# Patient Record
Sex: MALE | Race: WHITE | Employment: OTHER | URBAN - METROPOLITAN AREA
[De-identification: names, ages, dates, MRNs, and addresses within clinical notes are randomized per-mention and may not be internally consistent; named-entity substitution may affect disease eponyms.]

---

## 2017-01-04 PROBLEM — Z09 HOSPITAL DISCHARGE FOLLOW-UP: Status: ACTIVE | Noted: 2017-01-04

## 2017-01-27 PROBLEM — Z92.21 STATUS POST CHEMOTHERAPY: Status: ACTIVE | Noted: 2017-01-27

## 2017-03-16 PROBLEM — C91.10 CLL (CHRONIC LYMPHOCYTIC LEUKEMIA) (HCC): Status: ACTIVE | Noted: 2017-03-16

## 2017-07-14 ENCOUNTER — HOSPITAL ENCOUNTER (OUTPATIENT)
Dept: CT IMAGING | Age: 72
Discharge: HOME | End: 2017-07-14
Attending: INTERNAL MEDICINE | Admitting: INTERNAL MEDICINE

## 2017-07-14 DIAGNOSIS — C91.10 CHRONIC LYMPHOCYTIC LEUKEMIA (HCC): ICD-10-CM

## 2017-07-14 DIAGNOSIS — Z79.899 ENCOUNTER FOR LONG-TERM (CURRENT) USE OF HIGH-RISK MEDICATION: ICD-10-CM

## 2017-07-14 DIAGNOSIS — Z79.899 OTHER LONG TERM (CURRENT) DRUG THERAPY: ICD-10-CM

## 2018-01-05 ENCOUNTER — HOSPITAL ENCOUNTER (OUTPATIENT)
Dept: CT IMAGING | Age: 73
Discharge: HOME | End: 2018-01-05
Attending: INTERNAL MEDICINE | Admitting: INTERNAL MEDICINE

## 2018-01-05 DIAGNOSIS — C91.12 CHRONIC LYMPHOCYTIC LEUKEMIA OF B-CELL TYPE IN RELAPSE (HCC): ICD-10-CM

## 2018-03-28 ENCOUNTER — HOSPITAL ENCOUNTER (OUTPATIENT)
Dept: CT IMAGING | Age: 73
Discharge: HOME | End: 2018-03-28
Attending: INTERNAL MEDICINE | Admitting: INTERNAL MEDICINE

## 2018-03-28 DIAGNOSIS — C91.12 CHRONIC LYMPHOCYTIC LEUKEMIA OF B-CELL TYPE IN RELAPSE (HCC): ICD-10-CM

## 2018-03-28 DIAGNOSIS — C91.10 CHRONIC LYMPHOCYTIC LEUKEMIA OF B-CELL TYPE NOT HAVING ACHIEVED REMISSION (HCC): ICD-10-CM

## 2018-03-28 LAB
GFR AFRICAN AMERICAN: >60
GFR NON-AFRICAN AMERICAN: 54
PERFORMED ON: ABNORMAL
POC CREATININE: 1.3 MG/DL (ref 0.8–1.3)
POC SAMPLE TYPE: ABNORMAL

## 2018-09-27 ENCOUNTER — HOSPITAL ENCOUNTER (OUTPATIENT)
Dept: CT IMAGING | Age: 73
Discharge: HOME | End: 2018-09-27
Payer: COMMERCIAL

## 2018-09-27 DIAGNOSIS — C91.10 CLL (CHRONIC LYMPHOCYTIC LEUKEMIA) (HCC): ICD-10-CM

## 2018-09-27 PROBLEM — Z09 HOSPITAL DISCHARGE FOLLOW-UP: Status: RESOLVED | Noted: 2017-01-04 | Resolved: 2018-09-27

## 2018-09-27 PROCEDURE — 74177 CT ABD & PELVIS W/CONTRAST: CPT

## 2018-09-27 PROCEDURE — 6360000004 HC RX CONTRAST MEDICATION: Performed by: FAMILY MEDICINE

## 2018-09-27 PROCEDURE — 71260 CT THORAX DX C+: CPT

## 2018-09-27 RX ADMIN — IOHEXOL 50 ML: 240 INJECTION, SOLUTION INTRATHECAL; INTRAVASCULAR; INTRAVENOUS; ORAL at 09:13

## 2018-09-27 RX ADMIN — IOPAMIDOL 75 ML: 755 INJECTION, SOLUTION INTRAVENOUS at 09:14

## 2018-12-24 ENCOUNTER — APPOINTMENT (OUTPATIENT)
Dept: CT IMAGING | Age: 73
End: 2018-12-24
Payer: COMMERCIAL

## 2018-12-24 ENCOUNTER — HOSPITAL ENCOUNTER (EMERGENCY)
Age: 73
Discharge: HOME | End: 2018-12-24
Attending: EMERGENCY MEDICINE
Payer: COMMERCIAL

## 2018-12-24 VITALS
SYSTOLIC BLOOD PRESSURE: 147 MMHG | WEIGHT: 193.78 LBS | HEART RATE: 87 BPM | RESPIRATION RATE: 14 BRPM | HEIGHT: 64 IN | OXYGEN SATURATION: 97 % | BODY MASS INDEX: 33.08 KG/M2 | DIASTOLIC BLOOD PRESSURE: 70 MMHG | TEMPERATURE: 97.7 F

## 2018-12-24 DIAGNOSIS — S01.01XA LACERATION OF SCALP, INITIAL ENCOUNTER: ICD-10-CM
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
## 2018-12-24 DIAGNOSIS — S09.90XA INJURY OF HEAD, INITIAL ENCOUNTER: ICD-10-CM

## 2018-12-24 PROCEDURE — 6360000002 HC RX W HCPCS: Performed by: EMERGENCY MEDICINE

## 2018-12-24 PROCEDURE — 72125 CT NECK SPINE W/O DYE: CPT

## 2018-12-24 PROCEDURE — 99284 EMERGENCY DEPT VISIT MOD MDM: CPT

## 2018-12-24 PROCEDURE — 70450 CT HEAD/BRAIN W/O DYE: CPT

## 2018-12-24 PROCEDURE — 90471 IMMUNIZATION ADMIN: CPT | Performed by: EMERGENCY MEDICINE

## 2018-12-24 PROCEDURE — 90715 TDAP VACCINE 7 YRS/> IM: CPT | Performed by: EMERGENCY MEDICINE

## 2018-12-24 PROCEDURE — 4500000024 HC ED LEVEL 4 PROCEDURE

## 2018-12-24 RX ORDER — GLIMEPIRIDE 1 MG/1
1 TABLET ORAL
COMMUNITY

## 2018-12-24 RX ORDER — VENLAFAXINE 37.5 MG/1
37.5 TABLET ORAL DAILY
COMMUNITY

## 2018-12-24 RX ADMIN — TETANUS TOXOID, REDUCED DIPHTHERIA TOXOID AND ACELLULAR PERTUSSIS VACCINE, ADSORBED 0.5 ML: 5; 2.5; 8; 8; 2.5 SUSPENSION INTRAMUSCULAR at 20:27

## 2018-12-24 ASSESSMENT — ENCOUNTER SYMPTOMS
DIARRHEA: 0
BACK PAIN: 0
VOMITING: 0
COUGH: 0
EYE PAIN: 0
SHORTNESS OF BREATH: 0
ABDOMINAL PAIN: 0
NAUSEA: 0

## 2018-12-25 NOTE — ED NOTES
[facility]  Expected date:   Expected time:   Means of arrival:   Comments:  [address], RN  12/24/18 1958

## 2018-12-26 ENCOUNTER — HOSPITAL ENCOUNTER (OUTPATIENT)
Dept: CT IMAGING | Age: 73
Discharge: HOME | End: 2018-12-26
Payer: COMMERCIAL

## 2018-12-26 DIAGNOSIS — C91.12 CHRONIC LYMPHOCYTIC LEUKEMIA OF B-CELL TYPE IN RELAPSE (HCC): ICD-10-CM

## 2018-12-26 LAB
GFR AFRICAN AMERICAN: >60
GFR NON-AFRICAN AMERICAN: 59
PERFORMED ON: ABNORMAL
POC CREATININE: 1.2 MG/DL (ref 0.8–1.3)
POC SAMPLE TYPE: ABNORMAL

## 2018-12-26 PROCEDURE — 82565 ASSAY OF CREATININE: CPT

## 2018-12-26 PROCEDURE — 74177 CT ABD & PELVIS W/CONTRAST: CPT

## 2018-12-26 PROCEDURE — 6360000004 HC RX CONTRAST MEDICATION: Performed by: INTERNAL MEDICINE

## 2018-12-26 RX ADMIN — IOVERSOL 75 ML: 678 INJECTION INTRA-ARTERIAL; INTRAVENOUS at 08:28

## 2018-12-26 RX ADMIN — IOHEXOL 50 ML: 240 INJECTION, SOLUTION INTRATHECAL; INTRAVASCULAR; INTRAVENOUS; ORAL at 08:28

## 2019-05-03 ENCOUNTER — HOSPITAL ENCOUNTER (OUTPATIENT)
Dept: CT IMAGING | Age: 74
Discharge: HOME | End: 2019-05-03
Payer: COMMERCIAL

## 2019-05-03 DIAGNOSIS — C91.12 CHRONIC LYMPHOCYTIC LEUKEMIA OF B-CELL TYPE IN RELAPSE (HCC): ICD-10-CM

## 2019-05-03 LAB
GFR AFRICAN AMERICAN: >60
GFR NON-AFRICAN AMERICAN: >60
PERFORMED ON: NORMAL
POC CREATININE: 1 MG/DL (ref 0.8–1.3)
POC SAMPLE TYPE: NORMAL

## 2019-05-03 PROCEDURE — 82565 ASSAY OF CREATININE: CPT

## 2019-05-03 PROCEDURE — 6360000004 HC RX CONTRAST MEDICATION: Performed by: INTERNAL MEDICINE

## 2019-05-03 PROCEDURE — 74177 CT ABD & PELVIS W/CONTRAST: CPT

## 2019-05-03 RX ADMIN — IOPAMIDOL 75 ML: 755 INJECTION, SOLUTION INTRAVENOUS at 08:00

## 2019-05-03 RX ADMIN — IOHEXOL 50 ML: 240 INJECTION, SOLUTION INTRATHECAL; INTRAVASCULAR; INTRAVENOUS; ORAL at 08:00

## 2019-05-27 ENCOUNTER — APPOINTMENT (OUTPATIENT)
Dept: GENERAL RADIOLOGY | Age: 74
End: 2019-05-27
Payer: COMMERCIAL

## 2019-05-27 ENCOUNTER — HOSPITAL ENCOUNTER (EMERGENCY)
Age: 74
Discharge: HOME | End: 2019-05-27
Payer: COMMERCIAL

## 2019-05-27 VITALS
WEIGHT: 183.7 LBS | HEIGHT: 65 IN | SYSTOLIC BLOOD PRESSURE: 136 MMHG | TEMPERATURE: 98 F | BODY MASS INDEX: 30.6 KG/M2 | DIASTOLIC BLOOD PRESSURE: 82 MMHG | OXYGEN SATURATION: 99 % | RESPIRATION RATE: 18 BRPM | HEART RATE: 80 BPM

## 2019-05-27 DIAGNOSIS — R05.9 COUGH: ICD-10-CM

## 2019-05-27 DIAGNOSIS — R50.9 FEVER, UNSPECIFIED FEVER CAUSE: Primary | ICD-10-CM

## 2019-05-27 LAB
ANION GAP SERPL CALCULATED.3IONS-SCNC: 13 MMOL/L (ref 3–16)
BASOPHILS ABSOLUTE: 0 K/UL (ref 0–0.2)
BASOPHILS RELATIVE PERCENT: 0.5 %
BILIRUBIN URINE: NEGATIVE
BLOOD, URINE: NEGATIVE
BUN BLDV-MCNC: 15 MG/DL (ref 7–20)
CALCIUM SERPL-MCNC: 9.7 MG/DL (ref 8.3–10.6)
CHLORIDE BLD-SCNC: 94 MMOL/L (ref 99–110)
CHP ED QC CHECK: YES
CLARITY: CLEAR
CO2: 26 MMOL/L (ref 21–32)
COLOR: YELLOW
CREAT SERPL-MCNC: 1.2 MG/DL (ref 0.8–1.3)
EOSINOPHILS ABSOLUTE: 0 K/UL (ref 0–0.6)
EOSINOPHILS RELATIVE PERCENT: 0.1 %
GFR AFRICAN AMERICAN: >60
GFR NON-AFRICAN AMERICAN: 59
GLUCOSE BLD-MCNC: 282 MG/DL (ref 70–99)
GLUCOSE BLD-MCNC: 286 MG/DL
GLUCOSE BLD-MCNC: 286 MG/DL (ref 70–99)
GLUCOSE URINE: >=1000 MG/DL
HCT VFR BLD CALC: 40.6 % (ref 40.5–52.5)
HEMOGLOBIN: 14.2 G/DL (ref 13.5–17.5)
KETONES, URINE: NEGATIVE MG/DL
LACTIC ACID: 1.6 MMOL/L (ref 0.4–2)
LEUKOCYTE ESTERASE, URINE: NEGATIVE
LYMPHOCYTES ABSOLUTE: 1.4 K/UL (ref 1–5.1)
LYMPHOCYTES RELATIVE PERCENT: 19 %
MCH RBC QN AUTO: 30.6 PG (ref 26–34)
MCHC RBC AUTO-ENTMCNC: 34.9 G/DL (ref 31–36)
MCV RBC AUTO: 87.8 FL (ref 80–100)
MICROSCOPIC EXAMINATION: ABNORMAL
MONOCYTES ABSOLUTE: 0.5 K/UL (ref 0–1.3)
MONOCYTES RELATIVE PERCENT: 7.4 %
NEUTROPHILS ABSOLUTE: 5.2 K/UL (ref 1.7–7.7)
NEUTROPHILS RELATIVE PERCENT: 73 %
NITRITE, URINE: NEGATIVE
PDW BLD-RTO: 14.1 % (ref 12.4–15.4)
PERFORMED ON: ABNORMAL
PH UA: 7 (ref 5–8)
PLATELET # BLD: 121 K/UL (ref 135–450)
PMV BLD AUTO: 7.1 FL (ref 5–10.5)
POTASSIUM REFLEX MAGNESIUM: 4.4 MMOL/L (ref 3.5–5.1)
PROTEIN UA: NEGATIVE MG/DL
RAPID INFLUENZA  B AGN: NEGATIVE
RAPID INFLUENZA A AGN: NEGATIVE
RBC # BLD: 4.63 M/UL (ref 4.2–5.9)
SODIUM BLD-SCNC: 133 MMOL/L (ref 136–145)
SPECIFIC GRAVITY UA: 1.02 (ref 1–1.03)
URINE REFLEX TO CULTURE: ABNORMAL
URINE TYPE: ABNORMAL
UROBILINOGEN, URINE: 0.2 E.U./DL
WBC # BLD: 7.1 K/UL (ref 4–11)

## 2019-05-27 PROCEDURE — 87040 BLOOD CULTURE FOR BACTERIA: CPT

## 2019-05-27 PROCEDURE — 85025 COMPLETE CBC W/AUTO DIFF WBC: CPT

## 2019-05-27 PROCEDURE — 6370000000 HC RX 637 (ALT 250 FOR IP): Performed by: PHYSICIAN ASSISTANT

## 2019-05-27 PROCEDURE — 81003 URINALYSIS AUTO W/O SCOPE: CPT

## 2019-05-27 PROCEDURE — 99284 EMERGENCY DEPT VISIT MOD MDM: CPT

## 2019-05-27 PROCEDURE — 71046 X-RAY EXAM CHEST 2 VIEWS: CPT

## 2019-05-27 PROCEDURE — 2580000003 HC RX 258: Performed by: PHYSICIAN ASSISTANT

## 2019-05-27 PROCEDURE — 96360 HYDRATION IV INFUSION INIT: CPT

## 2019-05-27 PROCEDURE — 83605 ASSAY OF LACTIC ACID: CPT

## 2019-05-27 PROCEDURE — 87804 INFLUENZA ASSAY W/OPTIC: CPT

## 2019-05-27 PROCEDURE — 80048 BASIC METABOLIC PNL TOTAL CA: CPT

## 2019-05-27 RX ORDER — ACETAMINOPHEN 500 MG
1000 TABLET ORAL ONCE
Status: COMPLETED | OUTPATIENT
Start: 2019-05-27 | End: 2019-05-27

## 2019-05-27 RX ORDER — 0.9 % SODIUM CHLORIDE 0.9 %
1000 INTRAVENOUS SOLUTION INTRAVENOUS ONCE
Status: COMPLETED | OUTPATIENT
Start: 2019-05-27 | End: 2019-05-27

## 2019-05-27 RX ORDER — ACETAMINOPHEN 500 MG
500 TABLET ORAL EVERY 6 HOURS PRN
Qty: 30 TABLET | Refills: 0 | Status: SHIPPED | OUTPATIENT
Start: 2019-05-27

## 2019-05-27 RX ORDER — AMOXICILLIN AND CLAVULANATE POTASSIUM 875; 125 MG/1; MG/1
1 TABLET, FILM COATED ORAL 2 TIMES DAILY
Qty: 20 TABLET | Refills: 0 | Status: SHIPPED | OUTPATIENT
Start: 2019-05-27 | End: 2019-06-06

## 2019-05-27 RX ADMIN — ACETAMINOPHEN 1000 MG: 500 TABLET ORAL at 17:30

## 2019-05-27 RX ADMIN — SODIUM CHLORIDE 1000 ML: 9 INJECTION, SOLUTION INTRAVENOUS at 17:31

## 2019-05-27 ASSESSMENT — ENCOUNTER SYMPTOMS
COUGH: 1
SHORTNESS OF BREATH: 0
ABDOMINAL PAIN: 0
BACK PAIN: 0
NAUSEA: 0
VOMITING: 0
DIARRHEA: 0
EYE PAIN: 0

## 2019-05-27 ASSESSMENT — PAIN SCALES - GENERAL
PAINLEVEL_OUTOF10: 0
PAINLEVEL_OUTOF10: 0

## 2019-05-27 NOTE — ED NOTES
D/C: Order noted for d/c. Pt confirmed d/c paperwork  have correct name. Discharge and education instructions reviewed with patient. Teach-back successful. Pt verbalized understanding and signed d/c papers. Pt denied questions at this time. No acute distress noted. Patient instructed to follow-up as noted - return to emergency department if symptoms worsen. Patient verbalized understanding. Discharged per EDMD with discharge instructions. Pt discharged per ambulation  to private vehicle. Patient stable upon departure. Thanked patient for choosing [facility] for care.           Betty Madera RN  05/27/19 [id number]

## 2019-05-27 NOTE — ED TRIAGE NOTES
Pt to room 6 via squad pt wife states pt has been more confused today pt temp on arrival 102.2  Pt alert to person place and date. Pt wife denies pt having a cough pt denies having any urinary symptoms.

## 2019-05-27 NOTE — ED PROVIDER NOTES
(VENCLEXTA PO)    Take by mouth    VENLAFAXINE (EFFEXOR) 37.5 MG TABLET    Take 37.5 mg by mouth 3 times daily         ALLERGIES     Patient has no known allergies. FAMILYHISTORY       Family History   Problem Relation Age of Onset    Breast Cancer Sister     Coronary Art Dis Mother     Diabetes Mother     Elevated Lipids Mother     Hypertension Mother     Obesity Mother     Obesity Brother           SOCIAL HISTORY       Social History     Socioeconomic History    Marital status:      Spouse name: None    Number of children: None    Years of education: None    Highest education level: None   Occupational History    None   Social Needs    Financial resource strain: None    Food insecurity:     Worry: None     Inability: None    Transportation needs:     Medical: None     Non-medical: None   Tobacco Use    Smoking status: Never Smoker    Smokeless tobacco: Never Used   Substance and Sexual Activity    Alcohol use: Yes     Comment: rare use    Drug use: No    Sexual activity: None   Lifestyle    Physical activity:     Days per week: None     Minutes per session: None    Stress: None   Relationships    Social connections:     Talks on phone: None     Gets together: None     Attends [employer] service: None     Active member of club or organization: None     Attends meetings of clubs or organizations: None     Relationship status: None    Intimate partner violence:     Fear of current or ex partner: None     Emotionally abused: None     Physically abused: None     Forced sexual activity: None   Other Topics Concern    None   Social History Narrative    None       SCREENINGS             PHYSICAL EXAM    (up to 7 for level 4, 8 or more for level 5)     ED Triage Vitals [05/27/19 1557]   BP Temp Temp Source Pulse Resp SpO2 Height Weight   (!) 142/63 102.2 °F (39 °C) Oral 80 16 99 % 5' 5\" (1.651 m) 183 lb 11.2 oz (83.3 kg)       Physical Exam   Constitutional: He appears well-developed.  [facility]  [address] [facility] [address]   Phone [phone]   POCT GLUCOSE - Normal   RAPID INFLUENZA A/B ANTIGENS    Narrative:     Performed at:  [facility]  [address] [facility] [address]   Phone [phone]   CULTURE BLOOD #1   CULTURE BLOOD #2   LACTIC ACID, PLASMA    Narrative:     Performed at:  [facility]  [address] [facility] [address]   Phone [phone]   LACTIC ACID, PLASMA       All other labs were within normal range or not returned as of this dictation. EKG: All EKG's are interpreted by the Emergency Department Physician who either signs orCo-signs this chart in the absence of a cardiologist.  Please see their note for interpretation of EKG. RADIOLOGY:   Non-plain film images such as CT, Ultrasound and MRI are read by the radiologist. Plain radiographic images are visualized andpreliminarily interpreted by the  ED Provider with the below findings:        Interpretation perthe Radiologist below, if available at the time of this note:    XR CHEST STANDARD (2 VW)   Final Result   No acute abnormality detected. No results found.        PROCEDURES   Unless otherwise noted below, none     Procedures    CRITICAL CARE TIME   N/A    CONSULTS:  IP CONSULT TO HEM/ONC      EMERGENCY DEPARTMENT COURSE and DIFFERENTIALDIAGNOSIS/MDM:   Vitals:    Vitals:    05/27/19 1557 05/27/19 1716 05/27/19 1731 05/27/19 1802   BP: (!) 142/63 (!) 128/49 (!) 114/52    Pulse: 80 79 79    Resp: 16 17 15    Temp: 102.2 °F (39 °C)   99.1 °F (37.3 °C)   TempSrc: Oral      SpO2: 99%      Weight: 183 lb 11.2 oz (83.3 kg)      Height: 5' 5\" (1.651 m)          Patient was given thefollowing medications:  Medications   0.9 % sodium chloride bolus (1,000 mLs Intravenous New Bag 5/27/19 1731)   acetaminophen (TYLENOL) tablet 1,000 mg (1,000 mg Oral Given 5/27/19 1730)       ED COURSE & MEDICAL DISPOSITION/PLAN   DISPOSITION Discharge - Pending Orders Complete 05/27/2019 05:59:28 PM      PATIENT REFERREDTO:  Magalis Gay MD  [address]   [address]  [address] [facility]  [phone]      ED follow up    [facility]  [address] [facility]  [phone]    If symptoms worsen      DISCHARGE MEDICATIONS:  New Prescriptions    ACETAMINOPHEN (APAP EXTRA STRENGTH) 500 MG TABLET    Take 1 tablet by mouth every 6 hours as needed for Pain or Fever    AMOXICILLIN-CLAVULANATE (AUGMENTIN) 875-125 MG PER TABLET    Take 1 tablet by mouth 2 times daily for 10 days       DISCONTINUED MEDICATIONS:  Discontinued Medications    No medications on file              (Please note that portions ofthis note were completed with a voice recognition program.  Efforts were made to edit the dictations but occasionally words are mis-transcribed.)    BEREKET Morales (electronically signed)            BEREKET Morales  05/27/19 8257

## 2019-06-01 LAB
BLOOD CULTURE, ROUTINE: NORMAL
CULTURE, BLOOD 2: NORMAL

## 2019-08-18 ENCOUNTER — HOSPITAL ENCOUNTER (EMERGENCY)
Age: 74
Discharge: HOME | End: 2019-08-18
Payer: COMMERCIAL

## 2019-08-18 VITALS
TEMPERATURE: 98.1 F | DIASTOLIC BLOOD PRESSURE: 69 MMHG | HEART RATE: 99 BPM | OXYGEN SATURATION: 95 % | RESPIRATION RATE: 16 BRPM | SYSTOLIC BLOOD PRESSURE: 103 MMHG

## 2019-08-18 DIAGNOSIS — L03.113 CELLULITIS OF RIGHT UPPER EXTREMITY: ICD-10-CM

## 2019-08-18 DIAGNOSIS — T81.49XA POSTOPERATIVE WOUND INFECTION: Primary | ICD-10-CM

## 2019-08-18 PROCEDURE — 99283 EMERGENCY DEPT VISIT LOW MDM: CPT

## 2019-08-18 RX ORDER — CEPHALEXIN 500 MG/1
500 CAPSULE ORAL 4 TIMES DAILY
Qty: 28 CAPSULE | Refills: 0 | Status: SHIPPED | OUTPATIENT
Start: 2019-08-18 | End: 2019-08-25

## 2019-08-18 ASSESSMENT — ENCOUNTER SYMPTOMS: RESPIRATORY NEGATIVE: 1

## 2019-08-18 NOTE — ED TRIAGE NOTES
pt presents to ED for basal spot removal on R FA and scalp on L side. bandage on R FA. pt is a patient of Dr Ondina Calloway. Dariana Mims NP in to see pt. Pt is AAOx4, VSS.

## 2019-08-18 NOTE — ED PROVIDER NOTES
release left thumb and 5th finger    LYMPH NODE DISSECTION  2008    PRE-MALIGNANT / BENIGN SKIN LESION EXCISION      : excision 3.4 cm right shoulder lesion and 1.7 cm incisional biopsy left thumb    TUNNELED VENOUS PORT PLACEMENT Left 2006    TUNNELED VENOUS PORT PLACEMENT  2006       CURRENT MEDICATIONS       Previous Medications    ACETAMINOPHEN (APAP EXTRA STRENGTH) 500 MG TABLET    Take 1 tablet by mouth every 6 hours as needed for Pain or Fever    ASPIRIN 81 MG TABLET    Take 81 mg by mouth daily. DAPAGLIFLOZIN-METFORMIN HCL ER 5-500 MG TB24    Take 1 tablet by mouth daily    DESLORATADINE (CLARINEX) 5 MG TABLET    Take 5 mg by mouth daily. FENOFIBRATE (TRICOR) 145 MG TABLET    Take 145 mg by mouth daily. GLIMEPIRIDE (AMARYL) 1 MG TABLET    Take 1 mg by mouth every morning (before breakfast)    GLIPIZIDE (GLUCOTROL) 2.5 MG CR TABLET    Take 2.5 mg by mouth daily. IBRUTINIB (IMBRUVICA) 140 MG CHEMO CAPSULE    Take 3 po daily at the same time every day    INSULIN DEGLUDEC (TRESIBA FLEXTOUCH) 100 UNIT/ML SOPN    Inject into the skin    LIRAGLUTIDE (VICTOZA) 18 MG/3ML SOPN SC INJECTION    Inject 0.6 mg into the skin daily    LISINOPRIL (PRINIVIL;ZESTRIL) 10 MG TABLET    Take 20 mg by mouth daily     MULTIPLE VITAMIN PO    Take  by mouth. Take 1 vitamin a day. Senior Vitamin    PRAVASTATIN (PRAVACHOL) 80 MG TABLET    Take 80 mg by mouth daily. ROSUVASTATIN (CRESTOR) 40 MG TABLET    Take 40 mg by mouth every evening    VENETOCLAX (VENCLEXTA PO)    Take by mouth    VENLAFAXINE (EFFEXOR) 37.5 MG TABLET    Take 37.5 mg by mouth 3 times daily       ALLERGIES     Patient has no known allergies.     FAMILY HISTORY           Problem Relation Age of Onset    Breast Cancer Sister     Coronary Art Dis Mother     Diabetes Mother     Elevated Lipids Mother     Hypertension Mother     Obesity Mother     Obesity Brother      Family Status   Relation Name Status    Sister  (Not Specified)    Mother  (Not

## 2019-09-12 ENCOUNTER — HOSPITAL ENCOUNTER (OUTPATIENT)
Dept: CT IMAGING | Age: 74
Discharge: HOME | End: 2019-09-12
Payer: COMMERCIAL

## 2019-09-12 DIAGNOSIS — C91.12 CHRONIC LYMPHOCYTIC LEUKEMIA OF B-CELL TYPE IN RELAPSE (HCC): ICD-10-CM

## 2019-09-12 LAB
GFR AFRICAN AMERICAN: 60
GFR NON-AFRICAN AMERICAN: 49
PERFORMED ON: ABNORMAL
POC CREATININE: 1.4 MG/DL (ref 0.8–1.3)
POC SAMPLE TYPE: ABNORMAL

## 2019-09-12 PROCEDURE — 74177 CT ABD & PELVIS W/CONTRAST: CPT

## 2019-09-12 PROCEDURE — 6360000004 HC RX CONTRAST MEDICATION: Performed by: FAMILY MEDICINE

## 2019-09-12 PROCEDURE — 82565 ASSAY OF CREATININE: CPT

## 2019-09-12 RX ADMIN — IOHEXOL 50 ML: 240 INJECTION, SOLUTION INTRATHECAL; INTRAVASCULAR; INTRAVENOUS; ORAL at 07:40

## 2019-09-12 RX ADMIN — IOPAMIDOL 75 ML: 755 INJECTION, SOLUTION INTRAVENOUS at 07:41

## 2019-12-09 ENCOUNTER — HOSPITAL ENCOUNTER (OUTPATIENT)
Dept: GENERAL RADIOLOGY | Age: 74
Discharge: HOME | End: 2019-12-09
Payer: COMMERCIAL

## 2019-12-09 ENCOUNTER — HOSPITAL ENCOUNTER (OUTPATIENT)
Age: 74
Discharge: HOME | End: 2019-12-09
Payer: COMMERCIAL

## 2019-12-09 DIAGNOSIS — C91.12 CHRONIC LYMPHOCYTIC LEUKEMIA OF B-CELL TYPE IN RELAPSE (HCC): ICD-10-CM

## 2019-12-09 DIAGNOSIS — R50.9 FEVER, UNSPECIFIED FEVER CAUSE: ICD-10-CM

## 2019-12-09 PROCEDURE — 71046 X-RAY EXAM CHEST 2 VIEWS: CPT

## 2020-01-01 ENCOUNTER — ANESTHESIA EVENT (OUTPATIENT)
Dept: OPERATING ROOM | Age: 75
DRG: 577 | End: 2020-01-01
Payer: COMMERCIAL

## 2020-01-01 ENCOUNTER — TELEPHONE (OUTPATIENT)
Dept: ENT CLINIC | Age: 75
End: 2020-01-01

## 2020-01-01 ENCOUNTER — HOSPITAL ENCOUNTER (INPATIENT)
Age: 75
LOS: 3 days | Discharge: HOME HEALTH | DRG: 871 | End: 2020-03-06
Attending: EMERGENCY MEDICINE | Admitting: INTERNAL MEDICINE
Payer: COMMERCIAL

## 2020-01-01 ENCOUNTER — HOSPITAL ENCOUNTER (OUTPATIENT)
Dept: CT IMAGING | Age: 75
Discharge: HOME | DRG: 071 | End: 2020-03-23
Payer: COMMERCIAL

## 2020-01-01 ENCOUNTER — HOSPITAL ENCOUNTER (INPATIENT)
Age: 75
LOS: 2 days | Discharge: HOME | DRG: 071 | End: 2020-03-25
Attending: EMERGENCY MEDICINE | Admitting: INTERNAL MEDICINE
Payer: COMMERCIAL

## 2020-01-01 ENCOUNTER — VIRTUAL VISIT (OUTPATIENT)
Dept: SURGERY | Age: 75
End: 2020-01-01

## 2020-01-01 ENCOUNTER — CARE COORDINATION (OUTPATIENT)
Dept: CARE COORDINATION | Age: 75
End: 2020-01-01

## 2020-01-01 ENCOUNTER — TELEPHONE (OUTPATIENT)
Dept: SURGERY | Age: 75
End: 2020-01-01

## 2020-01-01 ENCOUNTER — OFFICE VISIT (OUTPATIENT)
Dept: ENT CLINIC | Age: 75
End: 2020-01-01

## 2020-01-01 ENCOUNTER — OFFICE VISIT (OUTPATIENT)
Dept: VASCULAR SURGERY | Age: 75
End: 2020-01-01
Payer: COMMERCIAL

## 2020-01-01 ENCOUNTER — OFFICE VISIT (OUTPATIENT)
Dept: ENT CLINIC | Age: 75
End: 2020-01-01
Payer: COMMERCIAL

## 2020-01-01 ENCOUNTER — HOSPITAL ENCOUNTER (OUTPATIENT)
Dept: INTERVENTIONAL RADIOLOGY/VASCULAR | Age: 75
Discharge: HOME | End: 2020-03-20
Payer: COMMERCIAL

## 2020-01-01 ENCOUNTER — HOSPITAL ENCOUNTER (OUTPATIENT)
Dept: CT IMAGING | Age: 75
Discharge: HOME | End: 2020-09-18
Payer: COMMERCIAL

## 2020-01-01 ENCOUNTER — APPOINTMENT (OUTPATIENT)
Dept: MRI IMAGING | Age: 75
DRG: 071 | End: 2020-01-01
Payer: COMMERCIAL

## 2020-01-01 ENCOUNTER — APPOINTMENT (OUTPATIENT)
Dept: CT IMAGING | Age: 75
End: 2020-01-01
Payer: COMMERCIAL

## 2020-01-01 ENCOUNTER — APPOINTMENT (OUTPATIENT)
Dept: GENERAL RADIOLOGY | Age: 75
End: 2020-01-01
Payer: COMMERCIAL

## 2020-01-01 ENCOUNTER — APPOINTMENT (OUTPATIENT)
Dept: CT IMAGING | Age: 75
DRG: 871 | End: 2020-01-01
Payer: COMMERCIAL

## 2020-01-01 ENCOUNTER — HOSPITAL ENCOUNTER (OUTPATIENT)
Dept: INTERVENTIONAL RADIOLOGY/VASCULAR | Age: 75
Discharge: HOME | End: 2020-05-05
Payer: COMMERCIAL

## 2020-01-01 ENCOUNTER — OFFICE VISIT (OUTPATIENT)
Dept: SURGERY | Age: 75
End: 2020-01-01

## 2020-01-01 ENCOUNTER — APPOINTMENT (OUTPATIENT)
Dept: GENERAL RADIOLOGY | Age: 75
DRG: 071 | End: 2020-01-01
Payer: COMMERCIAL

## 2020-01-01 ENCOUNTER — APPOINTMENT (OUTPATIENT)
Dept: INTERVENTIONAL RADIOLOGY/VASCULAR | Age: 75
DRG: 871 | End: 2020-01-01
Payer: COMMERCIAL

## 2020-01-01 ENCOUNTER — OFFICE VISIT (OUTPATIENT)
Dept: PRIMARY CARE CLINIC | Age: 75
End: 2020-01-01
Payer: COMMERCIAL

## 2020-01-01 ENCOUNTER — APPOINTMENT (OUTPATIENT)
Dept: CT IMAGING | Age: 75
DRG: 071 | End: 2020-01-01
Payer: COMMERCIAL

## 2020-01-01 ENCOUNTER — HOSPITAL ENCOUNTER (OUTPATIENT)
Dept: CT IMAGING | Age: 75
Discharge: HOME | End: 2020-01-23
Payer: COMMERCIAL

## 2020-01-01 ENCOUNTER — HOSPITAL ENCOUNTER (INPATIENT)
Age: 75
LOS: 2 days | Discharge: HOME | DRG: 577 | End: 2020-10-28
Attending: STUDENT IN AN ORGANIZED HEALTH CARE EDUCATION/TRAINING PROGRAM | Admitting: STUDENT IN AN ORGANIZED HEALTH CARE EDUCATION/TRAINING PROGRAM
Payer: COMMERCIAL

## 2020-01-01 ENCOUNTER — HOSPITAL ENCOUNTER (EMERGENCY)
Age: 75
Discharge: HOME | End: 2020-11-04
Attending: EMERGENCY MEDICINE
Payer: COMMERCIAL

## 2020-01-01 ENCOUNTER — APPOINTMENT (OUTPATIENT)
Dept: GENERAL RADIOLOGY | Age: 75
DRG: 871 | End: 2020-01-01
Payer: COMMERCIAL

## 2020-01-01 ENCOUNTER — ANESTHESIA (OUTPATIENT)
Dept: OPERATING ROOM | Age: 75
DRG: 577 | End: 2020-01-01
Payer: COMMERCIAL

## 2020-01-01 ENCOUNTER — HOSPITAL ENCOUNTER (OUTPATIENT)
Dept: CT IMAGING | Age: 75
Discharge: HOME | End: 2020-10-12
Payer: COMMERCIAL

## 2020-01-01 VITALS
DIASTOLIC BLOOD PRESSURE: 69 MMHG | TEMPERATURE: 97.4 F | HEART RATE: 97 BPM | TEMPERATURE: 98.6 F | DIASTOLIC BLOOD PRESSURE: 58 MMHG | OXYGEN SATURATION: 100 % | SYSTOLIC BLOOD PRESSURE: 89 MMHG | SYSTOLIC BLOOD PRESSURE: 156 MMHG | TEMPERATURE: 97.8 F

## 2020-01-01 VITALS
TEMPERATURE: 98.1 F | SYSTOLIC BLOOD PRESSURE: 105 MMHG | HEART RATE: 113 BPM | DIASTOLIC BLOOD PRESSURE: 58 MMHG | OXYGEN SATURATION: 98 %

## 2020-01-01 VITALS
HEIGHT: 64 IN | RESPIRATION RATE: 18 BRPM | TEMPERATURE: 98.6 F | DIASTOLIC BLOOD PRESSURE: 57 MMHG | HEIGHT: 63 IN | HEART RATE: 115 BPM | OXYGEN SATURATION: 95 % | BODY MASS INDEX: 27.49 KG/M2 | HEART RATE: 72 BPM | BODY MASS INDEX: 29.38 KG/M2 | OXYGEN SATURATION: 94 % | WEIGHT: 165.79 LBS | SYSTOLIC BLOOD PRESSURE: 104 MMHG | SYSTOLIC BLOOD PRESSURE: 107 MMHG | WEIGHT: 161 LBS | TEMPERATURE: 98.2 F | DIASTOLIC BLOOD PRESSURE: 70 MMHG | RESPIRATION RATE: 16 BRPM

## 2020-01-01 VITALS
RESPIRATION RATE: 16 BRPM | TEMPERATURE: 98.4 F | SYSTOLIC BLOOD PRESSURE: 135 MMHG | DIASTOLIC BLOOD PRESSURE: 64 MMHG | HEART RATE: 95 BPM | OXYGEN SATURATION: 93 %

## 2020-01-01 VITALS — TEMPERATURE: 97.9 F | DIASTOLIC BLOOD PRESSURE: 68 MMHG | HEART RATE: 86 BPM | SYSTOLIC BLOOD PRESSURE: 107 MMHG

## 2020-01-01 VITALS — DIASTOLIC BLOOD PRESSURE: 62 MMHG | SYSTOLIC BLOOD PRESSURE: 100 MMHG

## 2020-01-01 VITALS
SYSTOLIC BLOOD PRESSURE: 112 MMHG | RESPIRATION RATE: 18 BRPM | HEIGHT: 63 IN | OXYGEN SATURATION: 98 % | HEART RATE: 73 BPM | TEMPERATURE: 97 F | DIASTOLIC BLOOD PRESSURE: 52 MMHG | BODY MASS INDEX: 29.23 KG/M2 | WEIGHT: 165 LBS

## 2020-01-01 VITALS
SYSTOLIC BLOOD PRESSURE: 122 MMHG | DIASTOLIC BLOOD PRESSURE: 60 MMHG | BODY MASS INDEX: 29.02 KG/M2 | HEIGHT: 64 IN | WEIGHT: 170 LBS

## 2020-01-01 VITALS
RESPIRATION RATE: 18 BRPM | HEIGHT: 64 IN | DIASTOLIC BLOOD PRESSURE: 60 MMHG | OXYGEN SATURATION: 95 % | TEMPERATURE: 98.2 F | SYSTOLIC BLOOD PRESSURE: 114 MMHG | HEART RATE: 75 BPM | WEIGHT: 186.29 LBS | BODY MASS INDEX: 31.8 KG/M2

## 2020-01-01 VITALS — DIASTOLIC BLOOD PRESSURE: 66 MMHG | HEART RATE: 82 BPM | SYSTOLIC BLOOD PRESSURE: 116 MMHG | TEMPERATURE: 97.2 F

## 2020-01-01 DIAGNOSIS — C44.229 SQUAMOUS CELL CANCER OF EXTERNAL EAR, LEFT: ICD-10-CM

## 2020-01-01 LAB
A/G RATIO: 0.9 (ref 1.1–2.2)
A/G RATIO: 1.3 (ref 1.1–2.2)
A/G RATIO: 1.4 (ref 1.1–2.2)
A/G RATIO: 1.5 (ref 1.1–2.2)
A/G RATIO: 1.8 (ref 1.1–2.2)
ABO/RH: NORMAL
ACANTHOCYTES: ABNORMAL
ALBUMIN SERPL-MCNC: 2.6 G/DL (ref 3.4–5)
ALBUMIN SERPL-MCNC: 3.1 G/DL (ref 3.4–5)
ALBUMIN SERPL-MCNC: 3.3 G/DL (ref 3.4–5)
ALBUMIN SERPL-MCNC: 3.4 G/DL (ref 3.4–5)
ALBUMIN SERPL-MCNC: 3.5 G/DL (ref 3.4–5)
ALBUMIN SERPL-MCNC: 4.1 G/DL (ref 3.4–5)
ALP BLD-CCNC: 46 U/L (ref 40–129)
ALP BLD-CCNC: 53 U/L (ref 40–129)
ALP BLD-CCNC: 56 U/L (ref 40–129)
ALP BLD-CCNC: 65 U/L (ref 40–129)
ALP BLD-CCNC: 79 U/L (ref 40–129)
ALT SERPL-CCNC: 10 U/L (ref 10–40)
ALT SERPL-CCNC: 10 U/L (ref 10–40)
ALT SERPL-CCNC: 12 U/L (ref 10–40)
ALT SERPL-CCNC: 19 U/L (ref 10–40)
ALT SERPL-CCNC: 21 U/L (ref 10–40)
ANION GAP SERPL CALCULATED.3IONS-SCNC: 10 MMOL/L (ref 3–16)
ANION GAP SERPL CALCULATED.3IONS-SCNC: 12 MMOL/L (ref 3–16)
ANION GAP SERPL CALCULATED.3IONS-SCNC: 13 MMOL/L (ref 3–16)
ANION GAP SERPL CALCULATED.3IONS-SCNC: 13 MMOL/L (ref 3–16)
ANION GAP SERPL CALCULATED.3IONS-SCNC: 14 MMOL/L (ref 3–16)
ANION GAP SERPL CALCULATED.3IONS-SCNC: 17 MMOL/L (ref 3–16)
ANION GAP SERPL CALCULATED.3IONS-SCNC: 8 MMOL/L (ref 3–16)
ANION GAP SERPL CALCULATED.3IONS-SCNC: 9 MMOL/L (ref 3–16)
ANISOCYTOSIS: ABNORMAL
ANTIBODY SCREEN: NORMAL
ASPERGILLUS GALACTO AG: NEGATIVE
ASPERGILLUS GALACTO INDEX: 0.12
AST SERPL-CCNC: 22 U/L (ref 15–37)
AST SERPL-CCNC: 23 U/L (ref 15–37)
AST SERPL-CCNC: 34 U/L (ref 15–37)
AST SERPL-CCNC: 57 U/L (ref 15–37)
AST SERPL-CCNC: 57 U/L (ref 15–37)
ATYPICAL LYMPHOCYTE RELATIVE PERCENT: 11 % (ref 0–6)
ATYPICAL LYMPHOCYTE RELATIVE PERCENT: 6 % (ref 0–6)
BANDED NEUTROPHILS RELATIVE PERCENT: 1 % (ref 0–7)
BANDED NEUTROPHILS RELATIVE PERCENT: 2 % (ref 0–7)
BASOPHILS ABSOLUTE: 0 K/UL (ref 0–0.2)
BASOPHILS RELATIVE PERCENT: 0 %
BASOPHILS RELATIVE PERCENT: 0.1 %
BASOPHILS RELATIVE PERCENT: 0.5 %
BASOPHILS RELATIVE PERCENT: 0.6 %
BASOPHILS RELATIVE PERCENT: 1 %
BASOPHILS RELATIVE PERCENT: 1 %
BILIRUB SERPL-MCNC: 0.3 MG/DL (ref 0–1)
BILIRUB SERPL-MCNC: 0.3 MG/DL (ref 0–1)
BILIRUB SERPL-MCNC: 0.4 MG/DL (ref 0–1)
BILIRUB SERPL-MCNC: 0.5 MG/DL (ref 0–1)
BILIRUB SERPL-MCNC: 0.7 MG/DL (ref 0–1)
BILIRUBIN URINE: NEGATIVE
BILIRUBIN URINE: NEGATIVE
BLOOD CULTURE, ROUTINE: NORMAL
BLOOD CULTURE, ROUTINE: NORMAL
BLOOD, URINE: ABNORMAL
BLOOD, URINE: NEGATIVE
BUN BLDV-MCNC: 12 MG/DL (ref 7–20)
BUN BLDV-MCNC: 13 MG/DL (ref 7–20)
BUN BLDV-MCNC: 14 MG/DL (ref 7–20)
BUN BLDV-MCNC: 16 MG/DL (ref 7–20)
BUN BLDV-MCNC: 17 MG/DL (ref 7–20)
BUN BLDV-MCNC: 20 MG/DL (ref 7–20)
BUN BLDV-MCNC: 20 MG/DL (ref 7–20)
BUN BLDV-MCNC: 22 MG/DL (ref 7–20)
BUN BLDV-MCNC: 28 MG/DL (ref 7–20)
BUN BLDV-MCNC: 28 MG/DL (ref 7–20)
CALCIUM SERPL-MCNC: 10.1 MG/DL (ref 8.3–10.6)
CALCIUM SERPL-MCNC: 7.7 MG/DL (ref 8.3–10.6)
CALCIUM SERPL-MCNC: 8.6 MG/DL (ref 8.3–10.6)
CALCIUM SERPL-MCNC: 8.6 MG/DL (ref 8.3–10.6)
CALCIUM SERPL-MCNC: 9 MG/DL (ref 8.3–10.6)
CALCIUM SERPL-MCNC: 9 MG/DL (ref 8.3–10.6)
CALCIUM SERPL-MCNC: 9.1 MG/DL (ref 8.3–10.6)
CALCIUM SERPL-MCNC: 9.2 MG/DL (ref 8.3–10.6)
CALCIUM SERPL-MCNC: 9.2 MG/DL (ref 8.3–10.6)
CALCIUM SERPL-MCNC: 9.6 MG/DL (ref 8.3–10.6)
CHLORIDE BLD-SCNC: 101 MMOL/L (ref 99–110)
CHLORIDE BLD-SCNC: 102 MMOL/L (ref 99–110)
CHLORIDE BLD-SCNC: 102 MMOL/L (ref 99–110)
CHLORIDE BLD-SCNC: 103 MMOL/L (ref 99–110)
CHLORIDE BLD-SCNC: 103 MMOL/L (ref 99–110)
CHLORIDE BLD-SCNC: 105 MMOL/L (ref 99–110)
CHLORIDE BLD-SCNC: 106 MMOL/L (ref 99–110)
CHLORIDE BLD-SCNC: 111 MMOL/L (ref 99–110)
CHLORIDE BLD-SCNC: 99 MMOL/L (ref 99–110)
CHLORIDE BLD-SCNC: 99 MMOL/L (ref 99–110)
CLARITY: CLEAR
CLARITY: CLEAR
CMV DNA QNT PCR: <2.6 LOG CPY/ML
CMV DNA QUANTATATIVE INTERPRETATION: <2.4 LOG IU/ML
CMV DNA QUANTATATIVE INTERPRETATION: NOT DETECTED
CMV DNA QUANTITATIVE: <227 IU/ML
CMV SOURCE: NORMAL
CMVQ COPY/ML: <390 CPY/ML
CO2: 18 MMOL/L (ref 21–32)
CO2: 19 MMOL/L (ref 21–32)
CO2: 20 MMOL/L (ref 21–32)
CO2: 21 MMOL/L (ref 21–32)
CO2: 24 MMOL/L (ref 21–32)
CO2: 24 MMOL/L (ref 21–32)
CO2: 25 MMOL/L (ref 21–32)
CO2: 26 MMOL/L (ref 21–32)
COLOR: YELLOW
COLOR: YELLOW
CREAT SERPL-MCNC: 0.8 MG/DL (ref 0.8–1.3)
CREAT SERPL-MCNC: 0.8 MG/DL (ref 0.8–1.3)
CREAT SERPL-MCNC: 0.9 MG/DL (ref 0.8–1.3)
CREAT SERPL-MCNC: 1.2 MG/DL (ref 0.8–1.3)
CREAT SERPL-MCNC: 1.3 MG/DL (ref 0.8–1.3)
CREAT SERPL-MCNC: 1.4 MG/DL (ref 0.8–1.3)
CREAT SERPL-MCNC: 1.5 MG/DL (ref 0.8–1.3)
CULTURE, BLOOD 2: NORMAL
CULTURE, BLOOD 2: NORMAL
CULTURE, FUNGUS BLOOD: NORMAL
D DIMER: 549 NG/ML DDU (ref 0–229)
EKG ATRIAL RATE: 90 BPM
EKG ATRIAL RATE: 98 BPM
EKG DIAGNOSIS: NORMAL
EKG DIAGNOSIS: NORMAL
EKG P AXIS: 24 DEGREES
EKG P AXIS: 42 DEGREES
EKG P-R INTERVAL: 174 MS
EKG P-R INTERVAL: 192 MS
EKG Q-T INTERVAL: 348 MS
EKG Q-T INTERVAL: 356 MS
EKG QRS DURATION: 102 MS
EKG QRS DURATION: 80 MS
EKG QTC CALCULATION (BAZETT): 435 MS
EKG QTC CALCULATION (BAZETT): 444 MS
EKG R AXIS: 1 DEGREES
EKG R AXIS: 110 DEGREES
EKG T AXIS: 10 DEGREES
EKG T AXIS: 16 DEGREES
EKG VENTRICULAR RATE: 90 BPM
EKG VENTRICULAR RATE: 98 BPM
EOSINOPHILS ABSOLUTE: 0 K/UL (ref 0–0.6)
EOSINOPHILS ABSOLUTE: 0.1 K/UL (ref 0–0.6)
EOSINOPHILS ABSOLUTE: 0.2 K/UL (ref 0–0.6)
EOSINOPHILS ABSOLUTE: 0.3 K/UL (ref 0–0.6)
EOSINOPHILS RELATIVE PERCENT: 0 %
EOSINOPHILS RELATIVE PERCENT: 0.1 %
EOSINOPHILS RELATIVE PERCENT: 1 %
EOSINOPHILS RELATIVE PERCENT: 1 %
EOSINOPHILS RELATIVE PERCENT: 2 %
EOSINOPHILS RELATIVE PERCENT: 3 %
EOSINOPHILS RELATIVE PERCENT: 3.3 %
EOSINOPHILS RELATIVE PERCENT: 4.1 %
EPITHELIAL CELLS, UA: 1 /HPF (ref 0–5)
EPSTEIN BARR VIRUS NUCLEAR AB IGG: 281 U/ML (ref 0–21.9)
EPSTEIN-BARR EARLY ANTIGEN ANTIBODY: <5 U/ML (ref 0–10.9)
EPSTEIN-BARR VCA IGG: 345 U/ML (ref 0–21.9)
EPSTEIN-BARR VCA IGM: <10 U/ML (ref 0–43.9)
GFR AFRICAN AMERICAN: 55
GFR AFRICAN AMERICAN: 60
GFR AFRICAN AMERICAN: >60
GFR NON-AFRICAN AMERICAN: 46
GFR NON-AFRICAN AMERICAN: 49
GFR NON-AFRICAN AMERICAN: 54
GFR NON-AFRICAN AMERICAN: 59
GFR NON-AFRICAN AMERICAN: >60
GLOBULIN: 2.3 G/DL
GLOBULIN: 2.3 G/DL
GLOBULIN: 2.5 G/DL
GLOBULIN: 2.7 G/DL
GLOBULIN: 3 G/DL
GLUCOSE BLD-MCNC: 105 MG/DL (ref 70–99)
GLUCOSE BLD-MCNC: 110 MG/DL (ref 70–99)
GLUCOSE BLD-MCNC: 111 MG/DL (ref 70–99)
GLUCOSE BLD-MCNC: 117 MG/DL (ref 70–99)
GLUCOSE BLD-MCNC: 118 MG/DL (ref 70–99)
GLUCOSE BLD-MCNC: 121 MG/DL (ref 70–99)
GLUCOSE BLD-MCNC: 129 MG/DL (ref 70–99)
GLUCOSE BLD-MCNC: 146 MG/DL (ref 70–99)
GLUCOSE BLD-MCNC: 146 MG/DL (ref 70–99)
GLUCOSE BLD-MCNC: 149 MG/DL (ref 70–99)
GLUCOSE BLD-MCNC: 163 MG/DL (ref 70–99)
GLUCOSE BLD-MCNC: 166 MG/DL (ref 70–99)
GLUCOSE BLD-MCNC: 169 MG/DL (ref 70–99)
GLUCOSE BLD-MCNC: 174 MG/DL (ref 70–99)
GLUCOSE BLD-MCNC: 176 MG/DL (ref 70–99)
GLUCOSE BLD-MCNC: 177 MG/DL (ref 70–99)
GLUCOSE BLD-MCNC: 178 MG/DL (ref 70–99)
GLUCOSE BLD-MCNC: 197 MG/DL (ref 70–99)
GLUCOSE BLD-MCNC: 199 MG/DL (ref 70–99)
GLUCOSE BLD-MCNC: 200 MG/DL (ref 70–99)
GLUCOSE BLD-MCNC: 206 MG/DL (ref 70–99)
GLUCOSE BLD-MCNC: 214 MG/DL (ref 70–99)
GLUCOSE BLD-MCNC: 218 MG/DL (ref 70–99)
GLUCOSE BLD-MCNC: 221 MG/DL (ref 70–99)
GLUCOSE BLD-MCNC: 222 MG/DL (ref 70–99)
GLUCOSE BLD-MCNC: 222 MG/DL (ref 70–99)
GLUCOSE BLD-MCNC: 236 MG/DL (ref 70–99)
GLUCOSE BLD-MCNC: 250 MG/DL (ref 70–99)
GLUCOSE BLD-MCNC: 270 MG/DL (ref 70–99)
GLUCOSE BLD-MCNC: 270 MG/DL (ref 70–99)
GLUCOSE BLD-MCNC: 287 MG/DL (ref 70–99)
GLUCOSE BLD-MCNC: 302 MG/DL (ref 70–99)
GLUCOSE BLD-MCNC: 316 MG/DL (ref 70–99)
GLUCOSE BLD-MCNC: 353 MG/DL (ref 70–99)
GLUCOSE BLD-MCNC: 58 MG/DL (ref 70–99)
GLUCOSE BLD-MCNC: 76 MG/DL (ref 70–99)
GLUCOSE BLD-MCNC: 78 MG/DL (ref 70–99)
GLUCOSE BLD-MCNC: 87 MG/DL (ref 70–99)
GLUCOSE BLD-MCNC: 94 MG/DL (ref 70–99)
GLUCOSE BLD-MCNC: 96 MG/DL (ref 70–99)
GLUCOSE URINE: >=1000 MG/DL
GLUCOSE URINE: NEGATIVE MG/DL
HCT VFR BLD CALC: 27.9 % (ref 40.5–52.5)
HCT VFR BLD CALC: 28.1 % (ref 40.5–52.5)
HCT VFR BLD CALC: 28.5 % (ref 40.5–52.5)
HCT VFR BLD CALC: 29 % (ref 40.5–52.5)
HCT VFR BLD CALC: 30.9 % (ref 40.5–52.5)
HCT VFR BLD CALC: 31 % (ref 40.5–52.5)
HCT VFR BLD CALC: 31.9 % (ref 40.5–52.5)
HCT VFR BLD CALC: 33.1 % (ref 40.5–52.5)
HCT VFR BLD CALC: 35.5 % (ref 40.5–52.5)
HCT VFR BLD CALC: 36.5 % (ref 40.5–52.5)
HEMATOLOGY PATH CONSULT: NORMAL
HEMATOLOGY PATH CONSULT: YES
HEMOGLOBIN: 10.6 G/DL (ref 13.5–17.5)
HEMOGLOBIN: 10.6 G/DL (ref 13.5–17.5)
HEMOGLOBIN: 11.2 G/DL (ref 13.5–17.5)
HEMOGLOBIN: 11.6 G/DL (ref 13.5–17.5)
HEMOGLOBIN: 12.1 G/DL (ref 13.5–17.5)
HEMOGLOBIN: 12.5 G/DL (ref 13.5–17.5)
HEMOGLOBIN: 9.5 G/DL (ref 13.5–17.5)
HEMOGLOBIN: 9.6 G/DL (ref 13.5–17.5)
HEMOGLOBIN: 9.8 G/DL (ref 13.5–17.5)
HEMOGLOBIN: 9.9 G/DL (ref 13.5–17.5)
HERPES SIMPLEX VIRUS BY PCR: NOT DETECTED
HSV SOURCE: NORMAL
HYALINE CASTS: 7 /LPF (ref 0–8)
INR BLD: 1.05 (ref 0.86–1.14)
INR BLD: 1.39 (ref 0.86–1.14)
KETONES, URINE: NEGATIVE MG/DL
KETONES, URINE: NEGATIVE MG/DL
L. PNEUMOPHILA SEROGP 1 UR AG: NORMAL
LACTIC ACID: 0.5 MMOL/L (ref 0.4–2)
LACTIC ACID: 0.5 MMOL/L (ref 0.4–2)
LACTIC ACID: 0.7 MMOL/L (ref 0.4–2)
LACTIC ACID: 2.9 MMOL/L (ref 0.4–2)
LEUKOCYTE ESTERASE, URINE: NEGATIVE
LEUKOCYTE ESTERASE, URINE: NEGATIVE
LIPASE: 66 U/L (ref 13–60)
LYMPHOCYTES ABSOLUTE: 0.1 K/UL (ref 1–5.1)
LYMPHOCYTES ABSOLUTE: 0.1 K/UL (ref 1–5.1)
LYMPHOCYTES ABSOLUTE: 0.2 K/UL (ref 1–5.1)
LYMPHOCYTES ABSOLUTE: 0.3 K/UL (ref 1–5.1)
LYMPHOCYTES ABSOLUTE: 0.3 K/UL (ref 1–5.1)
LYMPHOCYTES ABSOLUTE: 12.1 K/UL (ref 1–5.1)
LYMPHOCYTES ABSOLUTE: 12.5 K/UL (ref 1–5.1)
LYMPHOCYTES ABSOLUTE: 21.2 K/UL (ref 1–5.1)
LYMPHOCYTES RELATIVE PERCENT: 1.5 %
LYMPHOCYTES RELATIVE PERCENT: 10 %
LYMPHOCYTES RELATIVE PERCENT: 10.6 %
LYMPHOCYTES RELATIVE PERCENT: 13.9 %
LYMPHOCYTES RELATIVE PERCENT: 3 %
LYMPHOCYTES RELATIVE PERCENT: 33 %
LYMPHOCYTES RELATIVE PERCENT: 74 %
LYMPHOCYTES RELATIVE PERCENT: 75 %
MAGNESIUM: 1.5 MG/DL (ref 1.8–2.4)
MAGNESIUM: 2.3 MG/DL (ref 1.8–2.4)
MCH RBC QN AUTO: 30.1 PG (ref 26–34)
MCH RBC QN AUTO: 30.1 PG (ref 26–34)
MCH RBC QN AUTO: 30.5 PG (ref 26–34)
MCH RBC QN AUTO: 30.7 PG (ref 26–34)
MCH RBC QN AUTO: 30.8 PG (ref 26–34)
MCH RBC QN AUTO: 30.9 PG (ref 26–34)
MCH RBC QN AUTO: 30.9 PG (ref 26–34)
MCH RBC QN AUTO: 31 PG (ref 26–34)
MCHC RBC AUTO-ENTMCNC: 33.2 G/DL (ref 31–36)
MCHC RBC AUTO-ENTMCNC: 33.8 G/DL (ref 31–36)
MCHC RBC AUTO-ENTMCNC: 34 G/DL (ref 31–36)
MCHC RBC AUTO-ENTMCNC: 34.1 G/DL (ref 31–36)
MCHC RBC AUTO-ENTMCNC: 34.2 G/DL (ref 31–36)
MCHC RBC AUTO-ENTMCNC: 34.3 G/DL (ref 31–36)
MCHC RBC AUTO-ENTMCNC: 34.3 G/DL (ref 31–36)
MCHC RBC AUTO-ENTMCNC: 34.9 G/DL (ref 31–36)
MCHC RBC AUTO-ENTMCNC: 35.2 G/DL (ref 31–36)
MCHC RBC AUTO-ENTMCNC: 35.4 G/DL (ref 31–36)
MCV RBC AUTO: 87.2 FL (ref 80–100)
MCV RBC AUTO: 87.7 FL (ref 80–100)
MCV RBC AUTO: 87.9 FL (ref 80–100)
MCV RBC AUTO: 87.9 FL (ref 80–100)
MCV RBC AUTO: 88.2 FL (ref 80–100)
MCV RBC AUTO: 89.9 FL (ref 80–100)
MCV RBC AUTO: 90.2 FL (ref 80–100)
MCV RBC AUTO: 90.7 FL (ref 80–100)
MCV RBC AUTO: 91.3 FL (ref 80–100)
MCV RBC AUTO: 93.5 FL (ref 80–100)
MICROCYTES: ABNORMAL
MICROSCOPIC EXAMINATION: NORMAL
MICROSCOPIC EXAMINATION: YES
MONOCYTES ABSOLUTE: 0 K/UL (ref 0–1.3)
MONOCYTES ABSOLUTE: 0 K/UL (ref 0–1.3)
MONOCYTES ABSOLUTE: 0.1 K/UL (ref 0–1.3)
MONOCYTES ABSOLUTE: 0.1 K/UL (ref 0–1.3)
MONOCYTES ABSOLUTE: 0.3 K/UL (ref 0–1.3)
MONOCYTES ABSOLUTE: 0.5 K/UL (ref 0–1.3)
MONOCYTES ABSOLUTE: 0.8 K/UL (ref 0–1.3)
MONOCYTES ABSOLUTE: 2.4 K/UL (ref 0–1.3)
MONOCYTES RELATIVE PERCENT: 0 %
MONOCYTES RELATIVE PERCENT: 0.5 %
MONOCYTES RELATIVE PERCENT: 13.2 %
MONOCYTES RELATIVE PERCENT: 3 %
MONOCYTES RELATIVE PERCENT: 3.6 %
MONOCYTES RELATIVE PERCENT: 9 %
MYELOCYTE PERCENT: 1 %
NEUTROPHILS ABSOLUTE: 1.4 K/UL (ref 1.7–7.7)
NEUTROPHILS ABSOLUTE: 1.6 K/UL (ref 1.7–7.7)
NEUTROPHILS ABSOLUTE: 14.6 K/UL (ref 1.7–7.7)
NEUTROPHILS ABSOLUTE: 2.4 K/UL (ref 1.7–7.7)
NEUTROPHILS ABSOLUTE: 2.7 K/UL (ref 1.7–7.7)
NEUTROPHILS ABSOLUTE: 3.5 K/UL (ref 1.7–7.7)
NEUTROPHILS ABSOLUTE: 4.4 K/UL (ref 1.7–7.7)
NEUTROPHILS ABSOLUTE: 7.2 K/UL (ref 1.7–7.7)
NEUTROPHILS RELATIVE PERCENT: 10 %
NEUTROPHILS RELATIVE PERCENT: 21 %
NEUTROPHILS RELATIVE PERCENT: 52 %
NEUTROPHILS RELATIVE PERCENT: 69 %
NEUTROPHILS RELATIVE PERCENT: 81.2 %
NEUTROPHILS RELATIVE PERCENT: 83 %
NEUTROPHILS RELATIVE PERCENT: 91 %
NEUTROPHILS RELATIVE PERCENT: 97.8 %
NITRITE, URINE: NEGATIVE
NITRITE, URINE: NEGATIVE
OVALOCYTES: ABNORMAL
OVALOCYTES: ABNORMAL
PDW BLD-RTO: 15.1 % (ref 12.4–15.4)
PDW BLD-RTO: 15.2 % (ref 12.4–15.4)
PDW BLD-RTO: 15.6 % (ref 12.4–15.4)
PDW BLD-RTO: 16 % (ref 12.4–15.4)
PDW BLD-RTO: 16.3 % (ref 12.4–15.4)
PDW BLD-RTO: 16.4 % (ref 12.4–15.4)
PDW BLD-RTO: 16.5 % (ref 12.4–15.4)
PDW BLD-RTO: 16.7 % (ref 12.4–15.4)
PERFORMED ON: ABNORMAL
PERFORMED ON: NORMAL
PH UA: 5 (ref 5–8)
PH UA: 6 (ref 5–8)
PHOSPHORUS: 1.7 MG/DL (ref 2.5–4.9)
PHOSPHORUS: 2.3 MG/DL (ref 2.5–4.9)
PHOSPHORUS: 2.5 MG/DL (ref 2.5–4.9)
PLATELET # BLD: 109 K/UL (ref 135–450)
PLATELET # BLD: 23 K/UL (ref 135–450)
PLATELET # BLD: 28 K/UL (ref 135–450)
PLATELET # BLD: 38 K/UL (ref 135–450)
PLATELET # BLD: 42 K/UL (ref 135–450)
PLATELET # BLD: 48 K/UL (ref 135–450)
PLATELET # BLD: 56 K/UL (ref 135–450)
PLATELET # BLD: 72 K/UL (ref 135–450)
PLATELET # BLD: 92 K/UL (ref 135–450)
PLATELET # BLD: 93 K/UL (ref 135–450)
PLATELET # BLD: 94 K/UL (ref 135–450)
PLATELET SLIDE REVIEW: ABNORMAL
PMV BLD AUTO: 7.3 FL (ref 5–10.5)
PMV BLD AUTO: 7.5 FL (ref 5–10.5)
PMV BLD AUTO: 7.8 FL (ref 5–10.5)
PMV BLD AUTO: 7.9 FL (ref 5–10.5)
PMV BLD AUTO: 8 FL (ref 5–10.5)
PMV BLD AUTO: 8 FL (ref 5–10.5)
PMV BLD AUTO: 8.5 FL (ref 5–10.5)
POC CREATININE: 1 MG/DL (ref 0.8–1.3)
POC CREATININE: 1.1 MG/DL (ref 0.8–1.3)
POC SAMPLE TYPE: NORMAL
POC SAMPLE TYPE: NORMAL
POLYCHROMASIA: ABNORMAL
POLYCHROMASIA: ABNORMAL
POTASSIUM REFLEX MAGNESIUM: 4.1 MMOL/L (ref 3.5–5.1)
POTASSIUM REFLEX MAGNESIUM: 4.3 MMOL/L (ref 3.5–5.1)
POTASSIUM REFLEX MAGNESIUM: 4.6 MMOL/L (ref 3.5–5.1)
POTASSIUM SERPL-SCNC: 3.5 MMOL/L (ref 3.5–5.1)
POTASSIUM SERPL-SCNC: 3.6 MMOL/L (ref 3.5–5.1)
POTASSIUM SERPL-SCNC: 4 MMOL/L (ref 3.5–5.1)
POTASSIUM SERPL-SCNC: 4.1 MMOL/L (ref 3.5–5.1)
POTASSIUM SERPL-SCNC: 4.3 MMOL/L (ref 3.5–5.1)
POTASSIUM SERPL-SCNC: 4.5 MMOL/L (ref 3.5–5.1)
POTASSIUM SERPL-SCNC: 5.5 MMOL/L (ref 3.5–5.1)
PRO-BNP: 348 PG/ML (ref 0–449)
PROCALCITONIN: 0.2 NG/ML (ref 0–0.15)
PROCALCITONIN: 4.16 NG/ML (ref 0–0.15)
PROCALCITONIN: 9.22 NG/ML (ref 0–0.15)
PROTEIN UA: 30 MG/DL
PROTEIN UA: NEGATIVE MG/DL
PROTHROMBIN TIME: 12.2 SEC (ref 10–13.2)
PROTHROMBIN TIME: 16.2 SEC (ref 10–13.2)
RAPID INFLUENZA  B AGN: NEGATIVE
RAPID INFLUENZA A AGN: NEGATIVE
RBC # BLD: 3.05 M/UL (ref 4.2–5.9)
RBC # BLD: 3.1 M/UL (ref 4.2–5.9)
RBC # BLD: 3.18 M/UL (ref 4.2–5.9)
RBC # BLD: 3.18 M/UL (ref 4.2–5.9)
RBC # BLD: 3.52 M/UL (ref 4.2–5.9)
RBC # BLD: 3.52 M/UL (ref 4.2–5.9)
RBC # BLD: 3.62 M/UL (ref 4.2–5.9)
RBC # BLD: 3.79 M/UL (ref 4.2–5.9)
RBC # BLD: 3.93 M/UL (ref 4.2–5.9)
RBC # BLD: 4.06 M/UL (ref 4.2–5.9)
RBC UA: 1 /HPF (ref 0–4)
REPORT: NORMAL
RESPIRATORY PANEL PCR: NORMAL
SARS-COV-2, NAAT: NOT DETECTED
SARS-COV-2: NOT DETECTED
SLIDE REVIEW: ABNORMAL
SMUDGE CELLS: PRESENT
SMUDGE CELLS: PRESENT
SODIUM BLD-SCNC: 131 MMOL/L (ref 136–145)
SODIUM BLD-SCNC: 132 MMOL/L (ref 136–145)
SODIUM BLD-SCNC: 133 MMOL/L (ref 136–145)
SODIUM BLD-SCNC: 133 MMOL/L (ref 136–145)
SODIUM BLD-SCNC: 134 MMOL/L (ref 136–145)
SODIUM BLD-SCNC: 140 MMOL/L (ref 136–145)
SODIUM BLD-SCNC: 141 MMOL/L (ref 136–145)
SODIUM BLD-SCNC: 144 MMOL/L (ref 136–145)
SPECIFIC GRAVITY UA: 1.03 (ref 1–1.03)
SPECIFIC GRAVITY UA: >1.03 (ref 1–1.03)
SPHEROCYTES: ABNORMAL
STREP PNEUMONIAE ANTIGEN, URINE: NORMAL
TOTAL PROTEIN: 5.6 G/DL (ref 6.4–8.2)
TOTAL PROTEIN: 5.8 G/DL (ref 6.4–8.2)
TOTAL PROTEIN: 5.9 G/DL (ref 6.4–8.2)
TOTAL PROTEIN: 6.1 G/DL (ref 6.4–8.2)
TOTAL PROTEIN: 6.4 G/DL (ref 6.4–8.2)
TROPONIN: <0.01 NG/ML
URINE REFLEX TO CULTURE: ABNORMAL
URINE REFLEX TO CULTURE: NORMAL
URINE TYPE: ABNORMAL
URINE TYPE: NORMAL
UROBILINOGEN, URINE: 0.2 E.U./DL
UROBILINOGEN, URINE: 0.2 E.U./DL
VANCOMYCIN RANDOM: 4.3 UG/ML
VANCOMYCIN TROUGH: 11 UG/ML (ref 10–20)
WBC # BLD: 1.7 K/UL (ref 4–11)
WBC # BLD: 16.6 K/UL (ref 4–11)
WBC # BLD: 2.2 K/UL (ref 4–11)
WBC # BLD: 2.3 K/UL (ref 4–11)
WBC # BLD: 2.9 K/UL (ref 4–11)
WBC # BLD: 26.5 K/UL (ref 4–11)
WBC # BLD: 27.5 K/UL (ref 4–11)
WBC # BLD: 28.2 K/UL (ref 4–11)
WBC # BLD: 4.7 K/UL (ref 4–11)
WBC # BLD: 7.4 K/UL (ref 4–11)
WBC UA: 1 /HPF (ref 0–5)

## 2020-01-01 PROCEDURE — 82565 ASSAY OF CREATININE: CPT

## 2020-01-01 PROCEDURE — 97535 SELF CARE MNGMENT TRAINING: CPT

## 2020-01-01 PROCEDURE — 11107 INCAL BX SKN EA SEP/ADDL: CPT | Performed by: STUDENT IN AN ORGANIZED HEALTH CARE EDUCATION/TRAINING PROGRAM

## 2020-01-01 PROCEDURE — 15100 SPLT AGRFT T/A/L 1ST 100SQCM: CPT | Performed by: SURGERY

## 2020-01-01 PROCEDURE — 70498 CT ANGIOGRAPHY NECK: CPT

## 2020-01-01 PROCEDURE — 97116 GAIT TRAINING THERAPY: CPT

## 2020-01-01 PROCEDURE — 74177 CT ABD & PELVIS W/CONTRAST: CPT

## 2020-01-01 PROCEDURE — 2580000003 HC RX 258: Performed by: STUDENT IN AN ORGANIZED HEALTH CARE EDUCATION/TRAINING PROGRAM

## 2020-01-01 PROCEDURE — 70480 CT ORBIT/EAR/FOSSA W/O DYE: CPT

## 2020-01-01 PROCEDURE — 76937 US GUIDE VASCULAR ACCESS: CPT

## 2020-01-01 PROCEDURE — 36561 INSERT TUNNELED CV CATH: CPT

## 2020-01-01 PROCEDURE — 0WB8XZZ EXCISION OF CHEST WALL, EXTERNAL APPROACH: ICD-10-PCS | Performed by: SURGERY

## 2020-01-01 PROCEDURE — 88305 TISSUE EXAM BY PATHOLOGIST: CPT

## 2020-01-01 PROCEDURE — 6370000000 HC RX 637 (ALT 250 FOR IP): Performed by: STUDENT IN AN ORGANIZED HEALTH CARE EDUCATION/TRAINING PROGRAM

## 2020-01-01 PROCEDURE — 96361 HYDRATE IV INFUSION ADD-ON: CPT

## 2020-01-01 PROCEDURE — 6370000000 HC RX 637 (ALT 250 FOR IP): Performed by: INTERNAL MEDICINE

## 2020-01-01 PROCEDURE — 3700000000 HC ANESTHESIA ATTENDED CARE: Performed by: STUDENT IN AN ORGANIZED HEALTH CARE EDUCATION/TRAINING PROGRAM

## 2020-01-01 PROCEDURE — 6360000004 HC RX CONTRAST MEDICATION: Performed by: INTERNAL MEDICINE

## 2020-01-01 PROCEDURE — 36415 COLL VENOUS BLD VENIPUNCTURE: CPT

## 2020-01-01 PROCEDURE — 3600000014 HC SURGERY LEVEL 4 ADDTL 15MIN: Performed by: STUDENT IN AN ORGANIZED HEALTH CARE EDUCATION/TRAINING PROGRAM

## 2020-01-01 PROCEDURE — 99233 SBSQ HOSP IP/OBS HIGH 50: CPT | Performed by: INTERNAL MEDICINE

## 2020-01-01 PROCEDURE — 97530 THERAPEUTIC ACTIVITIES: CPT

## 2020-01-01 PROCEDURE — 6360000002 HC RX W HCPCS: Performed by: INTERNAL MEDICINE

## 2020-01-01 PROCEDURE — 2580000003 HC RX 258: Performed by: INTERNAL MEDICINE

## 2020-01-01 PROCEDURE — 80053 COMPREHEN METABOLIC PANEL: CPT

## 2020-01-01 PROCEDURE — 84145 PROCALCITONIN (PCT): CPT

## 2020-01-01 PROCEDURE — 85027 COMPLETE CBC AUTOMATED: CPT

## 2020-01-01 PROCEDURE — 99152 MOD SED SAME PHYS/QHP 5/>YRS: CPT

## 2020-01-01 PROCEDURE — 88342 IMHCHEM/IMCYTCHM 1ST ANTB: CPT

## 2020-01-01 PROCEDURE — 81003 URINALYSIS AUTO W/O SCOPE: CPT

## 2020-01-01 PROCEDURE — 1200000000 HC SEMI PRIVATE

## 2020-01-01 PROCEDURE — 86665 EPSTEIN-BARR CAPSID VCA: CPT

## 2020-01-01 PROCEDURE — 93005 ELECTROCARDIOGRAM TRACING: CPT | Performed by: EMERGENCY MEDICINE

## 2020-01-01 PROCEDURE — 94760 N-INVAS EAR/PLS OXIMETRY 1: CPT

## 2020-01-01 PROCEDURE — 71046 X-RAY EXAM CHEST 2 VIEWS: CPT

## 2020-01-01 PROCEDURE — 97162 PT EVAL MOD COMPLEX 30 MIN: CPT

## 2020-01-01 PROCEDURE — 6360000004 HC RX CONTRAST MEDICATION: Performed by: RADIOLOGY

## 2020-01-01 PROCEDURE — 83605 ASSAY OF LACTIC ACID: CPT

## 2020-01-01 PROCEDURE — 85025 COMPLETE CBC W/AUTO DIFF WBC: CPT

## 2020-01-01 PROCEDURE — A9577 INJ MULTIHANCE: HCPCS | Performed by: INTERNAL MEDICINE

## 2020-01-01 PROCEDURE — 2500000003 HC RX 250 WO HCPCS: Performed by: INTERNAL MEDICINE

## 2020-01-01 PROCEDURE — 2580000003 HC RX 258: Performed by: EMERGENCY MEDICINE

## 2020-01-01 PROCEDURE — 09B1XZZ EXCISION OF LEFT EXTERNAL EAR, EXTERNAL APPROACH: ICD-10-PCS | Performed by: SURGERY

## 2020-01-01 PROCEDURE — 0HR5X74 REPLACEMENT OF CHEST SKIN WITH AUTOLOGOUS TISSUE SUBSTITUTE, PARTIAL THICKNESS, EXTERNAL APPROACH: ICD-10-PCS | Performed by: SURGERY

## 2020-01-01 PROCEDURE — 2500000003 HC RX 250 WO HCPCS

## 2020-01-01 PROCEDURE — 6360000002 HC RX W HCPCS: Performed by: RADIOLOGY

## 2020-01-01 PROCEDURE — 15101 SPLT AGRFT T/A/L EA ADDL 100: CPT | Performed by: SURGERY

## 2020-01-01 PROCEDURE — 6360000002 HC RX W HCPCS: Performed by: STUDENT IN AN ORGANIZED HEALTH CARE EDUCATION/TRAINING PROGRAM

## 2020-01-01 PROCEDURE — 6360000004 HC RX CONTRAST MEDICATION: Performed by: STUDENT IN AN ORGANIZED HEALTH CARE EDUCATION/TRAINING PROGRAM

## 2020-01-01 PROCEDURE — 11106 INCAL BX SKN SINGLE LES: CPT | Performed by: STUDENT IN AN ORGANIZED HEALTH CARE EDUCATION/TRAINING PROGRAM

## 2020-01-01 PROCEDURE — 99285 EMERGENCY DEPT VISIT HI MDM: CPT

## 2020-01-01 PROCEDURE — 13101 CMPLX RPR TRUNK 2.6-7.5 CM: CPT | Performed by: SURGERY

## 2020-01-01 PROCEDURE — 97166 OT EVAL MOD COMPLEX 45 MIN: CPT

## 2020-01-01 PROCEDURE — 83735 ASSAY OF MAGNESIUM: CPT

## 2020-01-01 PROCEDURE — 0HBRXZZ EXCISION OF TOE NAIL, EXTERNAL APPROACH: ICD-10-PCS | Performed by: PODIATRIST

## 2020-01-01 PROCEDURE — C9290 INJ, BUPIVACAINE LIPOSOME: HCPCS | Performed by: STUDENT IN AN ORGANIZED HEALTH CARE EDUCATION/TRAINING PROGRAM

## 2020-01-01 PROCEDURE — 86850 RBC ANTIBODY SCREEN: CPT

## 2020-01-01 PROCEDURE — 71045 X-RAY EXAM CHEST 1 VIEW: CPT

## 2020-01-01 PROCEDURE — 0100U HC RESPIRPTHGN MULT REV TRANS & AMP PRB TECH 21 TRGT: CPT

## 2020-01-01 PROCEDURE — 2580000003 HC RX 258: Performed by: RADIOLOGY

## 2020-01-01 PROCEDURE — 2500000003 HC RX 250 WO HCPCS: Performed by: STUDENT IN AN ORGANIZED HEALTH CARE EDUCATION/TRAINING PROGRAM

## 2020-01-01 PROCEDURE — 0HBNXZZ EXCISION OF LEFT FOOT SKIN, EXTERNAL APPROACH: ICD-10-PCS | Performed by: PODIATRIST

## 2020-01-01 PROCEDURE — 81001 URINALYSIS AUTO W/SCOPE: CPT

## 2020-01-01 PROCEDURE — 6360000002 HC RX W HCPCS: Performed by: EMERGENCY MEDICINE

## 2020-01-01 PROCEDURE — 2500000003 HC RX 250 WO HCPCS: Performed by: NURSE ANESTHETIST, CERTIFIED REGISTERED

## 2020-01-01 PROCEDURE — 85610 PROTHROMBIN TIME: CPT

## 2020-01-01 PROCEDURE — 6360000002 HC RX W HCPCS: Performed by: NURSE ANESTHETIST, CERTIFIED REGISTERED

## 2020-01-01 PROCEDURE — 86664 EPSTEIN-BARR NUCLEAR ANTIGEN: CPT

## 2020-01-01 PROCEDURE — 88331 PATH CONSLTJ SURG 1 BLK 1SPC: CPT

## 2020-01-01 PROCEDURE — 70553 MRI BRAIN STEM W/O & W/DYE: CPT

## 2020-01-01 PROCEDURE — 69110 REMOVE EXTERNAL EAR PARTIAL: CPT | Performed by: STUDENT IN AN ORGANIZED HEALTH CARE EDUCATION/TRAINING PROGRAM

## 2020-01-01 PROCEDURE — 7100000010 HC PHASE II RECOVERY - FIRST 15 MIN

## 2020-01-01 PROCEDURE — 99244 OFF/OP CNSLTJ NEW/EST MOD 40: CPT | Performed by: STUDENT IN AN ORGANIZED HEALTH CARE EDUCATION/TRAINING PROGRAM

## 2020-01-01 PROCEDURE — 94660 CPAP INITIATION&MGMT: CPT

## 2020-01-01 PROCEDURE — 2580000003 HC RX 258: Performed by: ANESTHESIOLOGY

## 2020-01-01 PROCEDURE — 71250 CT THORAX DX C-: CPT

## 2020-01-01 PROCEDURE — 6370000000 HC RX 637 (ALT 250 FOR IP): Performed by: EMERGENCY MEDICINE

## 2020-01-01 PROCEDURE — 99283 EMERGENCY DEPT VISIT LOW MDM: CPT

## 2020-01-01 PROCEDURE — 80202 ASSAY OF VANCOMYCIN: CPT

## 2020-01-01 PROCEDURE — 99215 OFFICE O/P EST HI 40 MIN: CPT | Performed by: STUDENT IN AN ORGANIZED HEALTH CARE EDUCATION/TRAINING PROGRAM

## 2020-01-01 PROCEDURE — 87804 INFLUENZA ASSAY W/OPTIC: CPT

## 2020-01-01 PROCEDURE — 14021 TIS TRNFR S/A/L 10.1-30 SQCM: CPT | Performed by: SURGERY

## 2020-01-01 PROCEDURE — 85049 AUTOMATED PLATELET COUNT: CPT

## 2020-01-01 PROCEDURE — 6360000004 HC RX CONTRAST MEDICATION: Performed by: EMERGENCY MEDICINE

## 2020-01-01 PROCEDURE — 99211 OFF/OP EST MAY X REQ PHY/QHP: CPT | Performed by: NURSE PRACTITIONER

## 2020-01-01 PROCEDURE — 99024 POSTOP FOLLOW-UP VISIT: CPT | Performed by: SURGERY

## 2020-01-01 PROCEDURE — 93970 EXTREMITY STUDY: CPT

## 2020-01-01 PROCEDURE — 84484 ASSAY OF TROPONIN QUANT: CPT

## 2020-01-01 PROCEDURE — 36598 INJ W/FLUOR EVAL CV DEVICE: CPT

## 2020-01-01 PROCEDURE — 71260 CT THORAX DX C+: CPT

## 2020-01-01 PROCEDURE — 83880 ASSAY OF NATRIURETIC PEPTIDE: CPT

## 2020-01-01 PROCEDURE — 99153 MOD SED SAME PHYS/QHP EA: CPT

## 2020-01-01 PROCEDURE — 3700000001 HC ADD 15 MINUTES (ANESTHESIA): Performed by: STUDENT IN AN ORGANIZED HEALTH CARE EDUCATION/TRAINING PROGRAM

## 2020-01-01 PROCEDURE — 83690 ASSAY OF LIPASE: CPT

## 2020-01-01 PROCEDURE — C1788 PORT, INDWELLING, IMP: HCPCS

## 2020-01-01 PROCEDURE — 80069 RENAL FUNCTION PANEL: CPT

## 2020-01-01 PROCEDURE — 99204 OFFICE O/P NEW MOD 45 MIN: CPT | Performed by: SURGERY

## 2020-01-01 PROCEDURE — 09B0XZZ EXCISION OF RIGHT EXTERNAL EAR, EXTERNAL APPROACH: ICD-10-PCS | Performed by: SURGERY

## 2020-01-01 PROCEDURE — 3600000004 HC SURGERY LEVEL 4 BASE: Performed by: STUDENT IN AN ORGANIZED HEALTH CARE EDUCATION/TRAINING PROGRAM

## 2020-01-01 PROCEDURE — 99232 SBSQ HOSP IP/OBS MODERATE 35: CPT | Performed by: INTERNAL MEDICINE

## 2020-01-01 PROCEDURE — 86900 BLOOD TYPING SEROLOGIC ABO: CPT

## 2020-01-01 PROCEDURE — 80048 BASIC METABOLIC PNL TOTAL CA: CPT

## 2020-01-01 PROCEDURE — 14060 TIS TRNFR E/N/E/L 10 SQ CM/<: CPT | Performed by: STUDENT IN AN ORGANIZED HEALTH CARE EDUCATION/TRAINING PROGRAM

## 2020-01-01 PROCEDURE — 93010 ELECTROCARDIOGRAM REPORT: CPT | Performed by: INTERNAL MEDICINE

## 2020-01-01 PROCEDURE — 7100000011 HC PHASE II RECOVERY - ADDTL 15 MIN

## 2020-01-01 PROCEDURE — 99024 POSTOP FOLLOW-UP VISIT: CPT | Performed by: STUDENT IN AN ORGANIZED HEALTH CARE EDUCATION/TRAINING PROGRAM

## 2020-01-01 PROCEDURE — 0HR3X74 REPLACEMENT OF LEFT EAR SKIN WITH AUTOLOGOUS TISSUE SUBSTITUTE, PARTIAL THICKNESS, EXTERNAL APPROACH: ICD-10-PCS | Performed by: SURGERY

## 2020-01-01 PROCEDURE — 7100000001 HC PACU RECOVERY - ADDTL 15 MIN: Performed by: STUDENT IN AN ORGANIZED HEALTH CARE EDUCATION/TRAINING PROGRAM

## 2020-01-01 PROCEDURE — 87449 NOS EACH ORGANISM AG IA: CPT

## 2020-01-01 PROCEDURE — 97606 NEG PRS WND THER DME>50 SQCM: CPT | Performed by: SURGERY

## 2020-01-01 PROCEDURE — 15120 SPLT AGRFT F/S/N/H/F/G/M 1ST: CPT | Performed by: SURGERY

## 2020-01-01 PROCEDURE — 87305 ASPERGILLUS AG IA: CPT

## 2020-01-01 PROCEDURE — 77001 FLUOROGUIDE FOR VEIN DEVICE: CPT

## 2020-01-01 PROCEDURE — 2720000010 HC SURG SUPPLY STERILE: Performed by: STUDENT IN AN ORGANIZED HEALTH CARE EDUCATION/TRAINING PROGRAM

## 2020-01-01 PROCEDURE — 96360 HYDRATION IV INFUSION INIT: CPT

## 2020-01-01 PROCEDURE — 87529 HSV DNA AMP PROBE: CPT

## 2020-01-01 PROCEDURE — 85379 FIBRIN DEGRADATION QUANT: CPT

## 2020-01-01 PROCEDURE — 7100000000 HC PACU RECOVERY - FIRST 15 MIN: Performed by: STUDENT IN AN ORGANIZED HEALTH CARE EDUCATION/TRAINING PROGRAM

## 2020-01-01 PROCEDURE — 86901 BLOOD TYPING SEROLOGIC RH(D): CPT

## 2020-01-01 PROCEDURE — 2709999900 HC NON-CHARGEABLE SUPPLY: Performed by: STUDENT IN AN ORGANIZED HEALTH CARE EDUCATION/TRAINING PROGRAM

## 2020-01-01 PROCEDURE — 11606 EXC TR-EXT MAL+MARG >4 CM: CPT | Performed by: SURGERY

## 2020-01-01 PROCEDURE — 86663 EPSTEIN-BARR ANTIBODY: CPT

## 2020-01-01 PROCEDURE — 36590 REMOVAL TUNNELED CV CATH: CPT

## 2020-01-01 PROCEDURE — 70450 CT HEAD/BRAIN W/O DYE: CPT

## 2020-01-01 PROCEDURE — 70491 CT SOFT TISSUE NECK W/DYE: CPT

## 2020-01-01 PROCEDURE — 87103 BLOOD FUNGUS CULTURE: CPT

## 2020-01-01 PROCEDURE — 99255 IP/OBS CONSLTJ NEW/EST HI 80: CPT | Performed by: INTERNAL MEDICINE

## 2020-01-01 PROCEDURE — 87040 BLOOD CULTURE FOR BACTERIA: CPT

## 2020-01-01 PROCEDURE — 6360000002 HC RX W HCPCS: Performed by: SURGERY

## 2020-01-01 RX ORDER — OXYCODONE HYDROCHLORIDE 5 MG/1
5 TABLET ORAL EVERY 4 HOURS PRN
Status: DISCONTINUED | OUTPATIENT
Start: 2020-01-01 | End: 2020-01-01 | Stop reason: HOSPADM

## 2020-01-01 RX ORDER — DIPHENHYDRAMINE HYDROCHLORIDE 50 MG/ML
25 INJECTION INTRAMUSCULAR; INTRAVENOUS ONCE
Status: COMPLETED | OUTPATIENT
Start: 2020-01-01 | End: 2020-01-01

## 2020-01-01 RX ORDER — SODIUM CHLORIDE 0.9 % (FLUSH) 0.9 %
10 SYRINGE (ML) INJECTION EVERY 12 HOURS SCHEDULED
Status: DISCONTINUED | OUTPATIENT
Start: 2020-01-01 | End: 2020-01-01 | Stop reason: HOSPADM

## 2020-01-01 RX ORDER — ROCURONIUM BROMIDE 10 MG/ML
INJECTION, SOLUTION INTRAVENOUS PRN
Status: DISCONTINUED | OUTPATIENT
Start: 2020-01-01 | End: 2020-01-01 | Stop reason: SDUPTHER

## 2020-01-01 RX ORDER — SENNA AND DOCUSATE SODIUM 50; 8.6 MG/1; MG/1
2 TABLET, FILM COATED ORAL DAILY PRN
Status: DISCONTINUED | OUTPATIENT
Start: 2020-01-01 | End: 2020-01-01 | Stop reason: HOSPADM

## 2020-01-01 RX ORDER — LISINOPRIL 20 MG/1
20 TABLET ORAL DAILY
Status: DISCONTINUED | OUTPATIENT
Start: 2020-01-01 | End: 2020-01-01 | Stop reason: HOSPADM

## 2020-01-01 RX ORDER — SODIUM CHLORIDE 9 MG/ML
INJECTION, SOLUTION INTRAVENOUS CONTINUOUS
Status: DISCONTINUED | OUTPATIENT
Start: 2020-01-01 | End: 2020-01-01

## 2020-01-01 RX ORDER — DEXTROSE MONOHYDRATE 50 MG/ML
100 INJECTION, SOLUTION INTRAVENOUS PRN
Status: DISCONTINUED | OUTPATIENT
Start: 2020-01-01 | End: 2020-01-01 | Stop reason: HOSPADM

## 2020-01-01 RX ORDER — OXYCODONE HYDROCHLORIDE AND ACETAMINOPHEN 5; 325 MG/1; MG/1
2 TABLET ORAL EVERY 4 HOURS PRN
Status: DISCONTINUED | OUTPATIENT
Start: 2020-01-01 | End: 2020-01-01 | Stop reason: HOSPADM

## 2020-01-01 RX ORDER — OXYCODONE HYDROCHLORIDE AND ACETAMINOPHEN 5; 325 MG/1; MG/1
1 TABLET ORAL PRN
Status: DISCONTINUED | OUTPATIENT
Start: 2020-01-01 | End: 2020-01-01 | Stop reason: HOSPADM

## 2020-01-01 RX ORDER — ACETAMINOPHEN 10 MG/ML
1000 INJECTION, SOLUTION INTRAVENOUS EVERY 6 HOURS PRN
Status: DISCONTINUED | OUTPATIENT
Start: 2020-01-01 | End: 2020-01-01 | Stop reason: HOSPADM

## 2020-01-01 RX ORDER — OXYCODONE HYDROCHLORIDE 5 MG/1
10 TABLET ORAL EVERY 4 HOURS PRN
Status: DISCONTINUED | OUTPATIENT
Start: 2020-01-01 | End: 2020-01-01 | Stop reason: HOSPADM

## 2020-01-01 RX ORDER — VENLAFAXINE 75 MG/1
37.5 TABLET ORAL DAILY
Status: DISCONTINUED | OUTPATIENT
Start: 2020-01-01 | End: 2020-01-01 | Stop reason: HOSPADM

## 2020-01-01 RX ORDER — SODIUM CHLORIDE 9 MG/ML
INJECTION, SOLUTION INTRAVENOUS CONTINUOUS
Status: DISCONTINUED | OUTPATIENT
Start: 2020-01-01 | End: 2020-01-01 | Stop reason: HOSPADM

## 2020-01-01 RX ORDER — ASPIRIN 81 MG/1
81 TABLET, CHEWABLE ORAL DAILY
Status: DISCONTINUED | OUTPATIENT
Start: 2020-01-01 | End: 2020-01-01 | Stop reason: HOSPADM

## 2020-01-01 RX ORDER — ACALABRUTINIB 100 MG/1
CAPSULE, GELATIN COATED ORAL 2 TIMES DAILY
COMMUNITY
Start: 2020-01-01

## 2020-01-01 RX ORDER — ONDANSETRON 4 MG/1
4 TABLET, ORALLY DISINTEGRATING ORAL EVERY 8 HOURS PRN
Qty: 15 TABLET | Refills: 0 | Status: SHIPPED | OUTPATIENT
Start: 2020-01-01

## 2020-01-01 RX ORDER — CETIRIZINE HYDROCHLORIDE 10 MG/1
10 TABLET ORAL DAILY
Status: DISCONTINUED | OUTPATIENT
Start: 2020-01-01 | End: 2020-01-01 | Stop reason: HOSPADM

## 2020-01-01 RX ORDER — SUCCINYLCHOLINE/SOD CL,ISO/PF 200MG/10ML
SYRINGE (ML) INTRAVENOUS PRN
Status: DISCONTINUED | OUTPATIENT
Start: 2020-01-01 | End: 2020-01-01 | Stop reason: SDUPTHER

## 2020-01-01 RX ORDER — LIDOCAINE HYDROCHLORIDE 20 MG/ML
INJECTION, SOLUTION INTRAVENOUS PRN
Status: DISCONTINUED | OUTPATIENT
Start: 2020-01-01 | End: 2020-01-01 | Stop reason: SDUPTHER

## 2020-01-01 RX ORDER — 0.9 % SODIUM CHLORIDE 0.9 %
1000 INTRAVENOUS SOLUTION INTRAVENOUS ONCE
Status: COMPLETED | OUTPATIENT
Start: 2020-01-01 | End: 2020-01-01

## 2020-01-01 RX ORDER — FAMOTIDINE 20 MG/1
20 TABLET, FILM COATED ORAL 2 TIMES DAILY
Status: DISCONTINUED | OUTPATIENT
Start: 2020-01-01 | End: 2020-01-01 | Stop reason: HOSPADM

## 2020-01-01 RX ORDER — ACETAMINOPHEN 325 MG/1
650 TABLET ORAL ONCE
Status: COMPLETED | OUTPATIENT
Start: 2020-01-01 | End: 2020-01-01

## 2020-01-01 RX ORDER — POLYETHYLENE GLYCOL 3350 17 G/17G
17 POWDER, FOR SOLUTION ORAL DAILY PRN
Status: DISCONTINUED | OUTPATIENT
Start: 2020-01-01 | End: 2020-01-01 | Stop reason: HOSPADM

## 2020-01-01 RX ORDER — NICOTINE POLACRILEX 4 MG
15 LOZENGE BUCCAL PRN
Status: DISCONTINUED | OUTPATIENT
Start: 2020-01-01 | End: 2020-01-01 | Stop reason: HOSPADM

## 2020-01-01 RX ORDER — CIPROFLOXACIN 2 MG/ML
INJECTION, SOLUTION INTRAVENOUS PRN
Status: DISCONTINUED | OUTPATIENT
Start: 2020-01-01 | End: 2020-01-01 | Stop reason: SDUPTHER

## 2020-01-01 RX ORDER — DIPHENHYDRAMINE HYDROCHLORIDE 50 MG/ML
12.5 INJECTION INTRAMUSCULAR; INTRAVENOUS
Status: DISCONTINUED | OUTPATIENT
Start: 2020-01-01 | End: 2020-01-01 | Stop reason: HOSPADM

## 2020-01-01 RX ORDER — PROCHLORPERAZINE EDISYLATE 5 MG/ML
5 INJECTION INTRAMUSCULAR; INTRAVENOUS
Status: DISCONTINUED | OUTPATIENT
Start: 2020-01-01 | End: 2020-01-01 | Stop reason: HOSPADM

## 2020-01-01 RX ORDER — ACETAMINOPHEN 500 MG
1000 TABLET ORAL EVERY 6 HOURS
Status: DISCONTINUED | OUTPATIENT
Start: 2020-01-01 | End: 2020-01-01 | Stop reason: HOSPADM

## 2020-01-01 RX ORDER — ONDANSETRON 2 MG/ML
4 INJECTION INTRAMUSCULAR; INTRAVENOUS EVERY 6 HOURS PRN
Status: DISCONTINUED | OUTPATIENT
Start: 2020-01-01 | End: 2020-01-01 | Stop reason: HOSPADM

## 2020-01-01 RX ORDER — DEXTROSE MONOHYDRATE 25 G/50ML
12.5 INJECTION, SOLUTION INTRAVENOUS PRN
Status: DISCONTINUED | OUTPATIENT
Start: 2020-01-01 | End: 2020-01-01 | Stop reason: HOSPADM

## 2020-01-01 RX ORDER — LEVOFLOXACIN 500 MG/1
500 TABLET, FILM COATED ORAL DAILY
Qty: 5 TABLET | Refills: 0 | Status: SHIPPED | OUTPATIENT
Start: 2020-01-01 | End: 2020-01-01

## 2020-01-01 RX ORDER — MAGNESIUM SULFATE IN WATER 40 MG/ML
4 INJECTION, SOLUTION INTRAVENOUS ONCE
Status: COMPLETED | OUTPATIENT
Start: 2020-01-01 | End: 2020-01-01

## 2020-01-01 RX ORDER — NEOSTIGMINE METHYLSULFATE 5 MG/5 ML
SYRINGE (ML) INTRAVENOUS PRN
Status: DISCONTINUED | OUTPATIENT
Start: 2020-01-01 | End: 2020-01-01 | Stop reason: SDUPTHER

## 2020-01-01 RX ORDER — MIDAZOLAM HYDROCHLORIDE 1 MG/ML
INJECTION INTRAMUSCULAR; INTRAVENOUS DAILY PRN
Status: COMPLETED | OUTPATIENT
Start: 2020-01-01 | End: 2020-01-01

## 2020-01-01 RX ORDER — HYDRALAZINE HYDROCHLORIDE 20 MG/ML
5 INJECTION INTRAMUSCULAR; INTRAVENOUS EVERY 10 MIN PRN
Status: DISCONTINUED | OUTPATIENT
Start: 2020-01-01 | End: 2020-01-01 | Stop reason: HOSPADM

## 2020-01-01 RX ORDER — ONDANSETRON 2 MG/ML
4 INJECTION INTRAMUSCULAR; INTRAVENOUS
Status: DISCONTINUED | OUTPATIENT
Start: 2020-01-01 | End: 2020-01-01 | Stop reason: HOSPADM

## 2020-01-01 RX ORDER — SODIUM CHLORIDE 0.9 % (FLUSH) 0.9 %
10 SYRINGE (ML) INJECTION PRN
Status: DISCONTINUED | OUTPATIENT
Start: 2020-01-01 | End: 2020-01-01 | Stop reason: HOSPADM

## 2020-01-01 RX ORDER — ROSUVASTATIN CALCIUM 10 MG/1
40 TABLET, COATED ORAL EVERY EVENING
Status: DISCONTINUED | OUTPATIENT
Start: 2020-01-01 | End: 2020-01-01 | Stop reason: HOSPADM

## 2020-01-01 RX ORDER — PROPOFOL 10 MG/ML
INJECTION, EMULSION INTRAVENOUS PRN
Status: DISCONTINUED | OUTPATIENT
Start: 2020-01-01 | End: 2020-01-01 | Stop reason: SDUPTHER

## 2020-01-01 RX ORDER — ACETAMINOPHEN 650 MG/1
650 SUPPOSITORY RECTAL EVERY 6 HOURS PRN
Status: DISCONTINUED | OUTPATIENT
Start: 2020-01-01 | End: 2020-01-01 | Stop reason: HOSPADM

## 2020-01-01 RX ORDER — FENTANYL CITRATE 50 UG/ML
INJECTION, SOLUTION INTRAMUSCULAR; INTRAVENOUS DAILY PRN
Status: COMPLETED | OUTPATIENT
Start: 2020-01-01 | End: 2020-01-01

## 2020-01-01 RX ORDER — GLYCOPYRROLATE 1 MG/5 ML
SYRINGE (ML) INTRAVENOUS PRN
Status: DISCONTINUED | OUTPATIENT
Start: 2020-01-01 | End: 2020-01-01 | Stop reason: SDUPTHER

## 2020-01-01 RX ORDER — OXYCODONE HYDROCHLORIDE AND ACETAMINOPHEN 5; 325 MG/1; MG/1
1 TABLET ORAL EVERY 4 HOURS PRN
Status: DISCONTINUED | OUTPATIENT
Start: 2020-01-01 | End: 2020-01-01 | Stop reason: HOSPADM

## 2020-01-01 RX ORDER — 0.9 % SODIUM CHLORIDE 0.9 %
500 INTRAVENOUS SOLUTION INTRAVENOUS ONCE
Status: COMPLETED | OUTPATIENT
Start: 2020-01-01 | End: 2020-01-01

## 2020-01-01 RX ORDER — ACETAMINOPHEN 325 MG/1
650 TABLET ORAL ONCE
Status: DISCONTINUED | OUTPATIENT
Start: 2020-01-01 | End: 2020-01-01 | Stop reason: HOSPADM

## 2020-01-01 RX ORDER — FENTANYL CITRATE 50 UG/ML
INJECTION, SOLUTION INTRAMUSCULAR; INTRAVENOUS PRN
Status: DISCONTINUED | OUTPATIENT
Start: 2020-01-01 | End: 2020-01-01 | Stop reason: SDUPTHER

## 2020-01-01 RX ORDER — ONDANSETRON 2 MG/ML
INJECTION INTRAMUSCULAR; INTRAVENOUS PRN
Status: DISCONTINUED | OUTPATIENT
Start: 2020-01-01 | End: 2020-01-01 | Stop reason: SDUPTHER

## 2020-01-01 RX ORDER — EPINEPHRINE NASAL SOLUTION 1 MG/ML
SOLUTION NASAL PRN
Status: DISCONTINUED | OUTPATIENT
Start: 2020-01-01 | End: 2020-01-01 | Stop reason: ALTCHOICE

## 2020-01-01 RX ORDER — ONDANSETRON 4 MG/1
4 TABLET, ORALLY DISINTEGRATING ORAL EVERY 8 HOURS PRN
Status: DISCONTINUED | OUTPATIENT
Start: 2020-01-01 | End: 2020-01-01 | Stop reason: HOSPADM

## 2020-01-01 RX ORDER — OXYCODONE HYDROCHLORIDE AND ACETAMINOPHEN 5; 325 MG/1; MG/1
2 TABLET ORAL PRN
Status: DISCONTINUED | OUTPATIENT
Start: 2020-01-01 | End: 2020-01-01 | Stop reason: HOSPADM

## 2020-01-01 RX ORDER — ONDANSETRON 2 MG/ML
4 INJECTION INTRAMUSCULAR; INTRAVENOUS EVERY 8 HOURS PRN
Status: DISCONTINUED | OUTPATIENT
Start: 2020-01-01 | End: 2020-01-01 | Stop reason: HOSPADM

## 2020-01-01 RX ORDER — MAGNESIUM HYDROXIDE 1200 MG/15ML
LIQUID ORAL CONTINUOUS PRN
Status: COMPLETED | OUTPATIENT
Start: 2020-01-01 | End: 2020-01-01

## 2020-01-01 RX ORDER — ACETAMINOPHEN 325 MG/1
650 TABLET ORAL EVERY 6 HOURS PRN
Status: DISCONTINUED | OUTPATIENT
Start: 2020-01-01 | End: 2020-01-01 | Stop reason: HOSPADM

## 2020-01-01 RX ORDER — MEPERIDINE HYDROCHLORIDE 25 MG/ML
12.5 INJECTION INTRAMUSCULAR; INTRAVENOUS; SUBCUTANEOUS EVERY 5 MIN PRN
Status: DISCONTINUED | OUTPATIENT
Start: 2020-01-01 | End: 2020-01-01 | Stop reason: HOSPADM

## 2020-01-01 RX ORDER — SODIUM CHLORIDE 9 MG/ML
INJECTION, SOLUTION INTRAVENOUS ONCE
Status: COMPLETED | OUTPATIENT
Start: 2020-01-01 | End: 2020-01-01

## 2020-01-01 RX ORDER — CEFAZOLIN SODIUM 2 G/50ML
2 SOLUTION INTRAVENOUS ONCE
Status: COMPLETED | OUTPATIENT
Start: 2020-01-01 | End: 2020-01-01

## 2020-01-01 RX ORDER — LIDOCAINE HYDROCHLORIDE AND EPINEPHRINE 10; 10 MG/ML; UG/ML
INJECTION, SOLUTION INFILTRATION; PERINEURAL PRN
Status: DISCONTINUED | OUTPATIENT
Start: 2020-01-01 | End: 2020-01-01 | Stop reason: ALTCHOICE

## 2020-01-01 RX ORDER — BACITRACIN ZINC AND POLYMYXIN B SULFATE 500; 1000 [USP'U]/G; [USP'U]/G
OINTMENT TOPICAL
Qty: 1 TUBE | Refills: 1 | Status: SHIPPED | OUTPATIENT
Start: 2020-01-01 | End: 2020-01-01

## 2020-01-01 RX ORDER — SODIUM CHLORIDE, SODIUM LACTATE, POTASSIUM CHLORIDE, CALCIUM CHLORIDE 600; 310; 30; 20 MG/100ML; MG/100ML; MG/100ML; MG/100ML
INJECTION, SOLUTION INTRAVENOUS CONTINUOUS
Status: DISCONTINUED | OUTPATIENT
Start: 2020-01-01 | End: 2020-01-01

## 2020-01-01 RX ORDER — ACETAMINOPHEN 325 MG/1
650 TABLET ORAL EVERY 4 HOURS PRN
Status: DISCONTINUED | OUTPATIENT
Start: 2020-01-01 | End: 2020-01-01 | Stop reason: HOSPADM

## 2020-01-01 RX ORDER — HEPARIN SODIUM 5000 [USP'U]/ML
5000 INJECTION, SOLUTION INTRAVENOUS; SUBCUTANEOUS EVERY 8 HOURS SCHEDULED
Status: DISCONTINUED | OUTPATIENT
Start: 2020-01-01 | End: 2020-01-01 | Stop reason: HOSPADM

## 2020-01-01 RX ORDER — MINERAL OIL
OIL (ML) MISCELLANEOUS PRN
Status: DISCONTINUED | OUTPATIENT
Start: 2020-01-01 | End: 2020-01-01 | Stop reason: ALTCHOICE

## 2020-01-01 RX ORDER — FENTANYL CITRATE 50 UG/ML
50 INJECTION, SOLUTION INTRAMUSCULAR; INTRAVENOUS EVERY 5 MIN PRN
Status: DISCONTINUED | OUTPATIENT
Start: 2020-01-01 | End: 2020-01-01 | Stop reason: HOSPADM

## 2020-01-01 RX ORDER — FENTANYL CITRATE 50 UG/ML
25 INJECTION, SOLUTION INTRAMUSCULAR; INTRAVENOUS EVERY 5 MIN PRN
Status: DISCONTINUED | OUTPATIENT
Start: 2020-01-01 | End: 2020-01-01 | Stop reason: HOSPADM

## 2020-01-01 RX ORDER — LABETALOL 20 MG/4 ML (5 MG/ML) INTRAVENOUS SYRINGE
5 EVERY 10 MIN PRN
Status: DISCONTINUED | OUTPATIENT
Start: 2020-01-01 | End: 2020-01-01 | Stop reason: HOSPADM

## 2020-01-01 RX ORDER — LABETALOL 20 MG/4 ML (5 MG/ML) INTRAVENOUS SYRINGE
5 EVERY 4 HOURS PRN
Status: DISCONTINUED | OUTPATIENT
Start: 2020-01-01 | End: 2020-01-01 | Stop reason: HOSPADM

## 2020-01-01 RX ORDER — BACITRACIN ZINC AND POLYMYXIN B SULFATE 500; 1000 [USP'U]/G; [USP'U]/G
OINTMENT TOPICAL 2 TIMES DAILY
Status: DISCONTINUED | OUTPATIENT
Start: 2020-01-01 | End: 2020-01-01 | Stop reason: HOSPADM

## 2020-01-01 RX ADMIN — Medication 1250 MG: at 01:27

## 2020-01-01 RX ADMIN — ENOXAPARIN SODIUM 40 MG: 40 INJECTION SUBCUTANEOUS at 09:15

## 2020-01-01 RX ADMIN — FAMOTIDINE 20 MG: 10 INJECTION, SOLUTION INTRAVENOUS at 17:21

## 2020-01-01 RX ADMIN — INSULIN LISPRO 1 UNITS: 100 INJECTION, SOLUTION INTRAVENOUS; SUBCUTANEOUS at 22:54

## 2020-01-01 RX ADMIN — VENLAFAXINE 37.5 MG: 75 TABLET ORAL at 09:15

## 2020-01-01 RX ADMIN — Medication 10 ML: at 08:33

## 2020-01-01 RX ADMIN — ASPIRIN 81 MG 81 MG: 81 TABLET ORAL at 08:19

## 2020-01-01 RX ADMIN — SODIUM CHLORIDE: 9 INJECTION, SOLUTION INTRAVENOUS at 05:21

## 2020-01-01 RX ADMIN — FAMOTIDINE 20 MG: 20 TABLET, FILM COATED ORAL at 22:53

## 2020-01-01 RX ADMIN — FAMOTIDINE 20 MG: 20 TABLET, FILM COATED ORAL at 09:48

## 2020-01-01 RX ADMIN — INSULIN LISPRO 3 UNITS: 100 INJECTION, SOLUTION INTRAVENOUS; SUBCUTANEOUS at 17:46

## 2020-01-01 RX ADMIN — ACETAMINOPHEN 1000 MG: 500 TABLET, COATED ORAL at 05:24

## 2020-01-01 RX ADMIN — GADOBENATE DIMEGLUMINE 15 ML: 529 INJECTION, SOLUTION INTRAVENOUS at 09:22

## 2020-01-01 RX ADMIN — Medication 1250 MG: at 09:30

## 2020-01-01 RX ADMIN — ASPIRIN 81 MG 81 MG: 81 TABLET ORAL at 08:33

## 2020-01-01 RX ADMIN — PIPERACILLIN AND TAZOBACTAM 3.38 G: 3; .375 INJECTION, POWDER, LYOPHILIZED, FOR SOLUTION INTRAVENOUS at 12:51

## 2020-01-01 RX ADMIN — FENTANYL CITRATE 50 MCG: 50 INJECTION INTRAMUSCULAR; INTRAVENOUS at 08:30

## 2020-01-01 RX ADMIN — SODIUM CHLORIDE, SODIUM LACTATE, POTASSIUM CHLORIDE, AND CALCIUM CHLORIDE: 600; 310; 30; 20 INJECTION, SOLUTION INTRAVENOUS at 06:35

## 2020-01-01 RX ADMIN — IOPAMIDOL 75 ML: 755 INJECTION, SOLUTION INTRAVENOUS at 13:17

## 2020-01-01 RX ADMIN — LISINOPRIL 20 MG: 20 TABLET ORAL at 09:48

## 2020-01-01 RX ADMIN — INSULIN LISPRO 1 UNITS: 100 INJECTION, SOLUTION INTRAVENOUS; SUBCUTANEOUS at 08:59

## 2020-01-01 RX ADMIN — IOPAMIDOL 75 ML: 755 INJECTION, SOLUTION INTRAVENOUS at 11:54

## 2020-01-01 RX ADMIN — VENLAFAXINE 37.5 MG: 75 TABLET ORAL at 08:20

## 2020-01-01 RX ADMIN — MAGNESIUM SULFATE HEPTAHYDRATE 4 G: 40 INJECTION, SOLUTION INTRAVENOUS at 09:18

## 2020-01-01 RX ADMIN — DIPHENHYDRAMINE HYDROCHLORIDE 25 MG: 50 INJECTION, SOLUTION INTRAMUSCULAR; INTRAVENOUS at 17:21

## 2020-01-01 RX ADMIN — PIPERACILLIN AND TAZOBACTAM 3.38 G: 3; .375 INJECTION, POWDER, LYOPHILIZED, FOR SOLUTION INTRAVENOUS at 22:38

## 2020-01-01 RX ADMIN — PIPERACILLIN AND TAZOBACTAM 3.38 G: 3; .375 INJECTION, POWDER, LYOPHILIZED, FOR SOLUTION INTRAVENOUS at 06:23

## 2020-01-01 RX ADMIN — INSULIN LISPRO 3 UNITS: 100 INJECTION, SOLUTION INTRAVENOUS; SUBCUTANEOUS at 17:17

## 2020-01-01 RX ADMIN — SODIUM CHLORIDE: 9 INJECTION, SOLUTION INTRAVENOUS at 01:12

## 2020-01-01 RX ADMIN — SODIUM PHOSPHATE, MONOBASIC, MONOHYDRATE 20 MMOL: 276; 142 INJECTION, SOLUTION INTRAVENOUS at 13:37

## 2020-01-01 RX ADMIN — Medication 180 MG: at 07:38

## 2020-01-01 RX ADMIN — CETIRIZINE HYDROCHLORIDE 10 MG: 10 TABLET, FILM COATED ORAL at 08:41

## 2020-01-01 RX ADMIN — CETIRIZINE HYDROCHLORIDE 10 MG: 10 TABLET, FILM COATED ORAL at 08:33

## 2020-01-01 RX ADMIN — ROSUVASTATIN CALCIUM 40 MG: 10 TABLET, FILM COATED ORAL at 17:00

## 2020-01-01 RX ADMIN — IOHEXOL 50 ML: 240 INJECTION, SOLUTION INTRATHECAL; INTRAVASCULAR; INTRAVENOUS; ORAL at 11:53

## 2020-01-01 RX ADMIN — Medication 10 ML: at 09:48

## 2020-01-01 RX ADMIN — PIPERACILLIN AND TAZOBACTAM 3.38 G: 3; .375 INJECTION, POWDER, LYOPHILIZED, FOR SOLUTION INTRAVENOUS at 09:22

## 2020-01-01 RX ADMIN — INSULIN LISPRO 2 UNITS: 100 INJECTION, SOLUTION INTRAVENOUS; SUBCUTANEOUS at 12:50

## 2020-01-01 RX ADMIN — PIPERACILLIN AND TAZOBACTAM 3.38 G: 3; .375 INJECTION, POWDER, LYOPHILIZED, FOR SOLUTION INTRAVENOUS at 06:07

## 2020-01-01 RX ADMIN — FENTANYL CITRATE 50 MCG: 50 INJECTION INTRAMUSCULAR; INTRAVENOUS at 11:40

## 2020-01-01 RX ADMIN — Medication 1250 MG: at 10:21

## 2020-01-01 RX ADMIN — ROCURONIUM BROMIDE 10 MG: 10 INJECTION INTRAVENOUS at 07:38

## 2020-01-01 RX ADMIN — PIPERACILLIN AND TAZOBACTAM 3.38 G: 3; .375 INJECTION, POWDER, LYOPHILIZED, FOR SOLUTION INTRAVENOUS at 05:17

## 2020-01-01 RX ADMIN — ACYCLOVIR SODIUM 685 MG: 50 INJECTION, SOLUTION INTRAVENOUS at 17:53

## 2020-01-01 RX ADMIN — PIPERACILLIN AND TAZOBACTAM 3.38 G: 3; .375 INJECTION, POWDER, LYOPHILIZED, FOR SOLUTION INTRAVENOUS at 12:33

## 2020-01-01 RX ADMIN — INSULIN LISPRO 2 UNITS: 100 INJECTION, SOLUTION INTRAVENOUS; SUBCUTANEOUS at 08:33

## 2020-01-01 RX ADMIN — PIPERACILLIN AND TAZOBACTAM 3.38 G: 3; .375 INJECTION, POWDER, LYOPHILIZED, FOR SOLUTION INTRAVENOUS at 13:46

## 2020-01-01 RX ADMIN — SODIUM CHLORIDE: 9 INJECTION, SOLUTION INTRAVENOUS at 11:46

## 2020-01-01 RX ADMIN — ROCURONIUM BROMIDE 20 MG: 10 INJECTION INTRAVENOUS at 08:29

## 2020-01-01 RX ADMIN — SODIUM CHLORIDE 1000 ML: 9 INJECTION, SOLUTION INTRAVENOUS at 05:43

## 2020-01-01 RX ADMIN — Medication 10 ML: at 08:42

## 2020-01-01 RX ADMIN — DIBASIC SODIUM PHOSPHATE, MONOBASIC POTASSIUM PHOSPHATE AND MONOBASIC SODIUM PHOSPHATE 2 TABLET: 852; 155; 130 TABLET ORAL at 13:55

## 2020-01-01 RX ADMIN — ROCURONIUM BROMIDE 10 MG: 10 INJECTION INTRAVENOUS at 09:13

## 2020-01-01 RX ADMIN — FAMOTIDINE 20 MG: 10 INJECTION, SOLUTION INTRAVENOUS at 08:41

## 2020-01-01 RX ADMIN — IOPAMIDOL 5 ML: 612 INJECTION, SOLUTION INTRATHECAL at 09:55

## 2020-01-01 RX ADMIN — MIDAZOLAM 1 MG: 1 INJECTION INTRAMUSCULAR; INTRAVENOUS at 11:17

## 2020-01-01 RX ADMIN — ROCURONIUM BROMIDE 10 MG: 10 INJECTION INTRAVENOUS at 08:09

## 2020-01-01 RX ADMIN — INSULIN LISPRO 2 UNITS: 100 INJECTION, SOLUTION INTRAVENOUS; SUBCUTANEOUS at 08:41

## 2020-01-01 RX ADMIN — SODIUM CHLORIDE 1000 ML: 9 INJECTION, SOLUTION INTRAVENOUS at 06:42

## 2020-01-01 RX ADMIN — Medication 0.4 MG: at 10:51

## 2020-01-01 RX ADMIN — FAMOTIDINE 20 MG: 20 TABLET, FILM COATED ORAL at 01:30

## 2020-01-01 RX ADMIN — IOPAMIDOL 75 ML: 755 INJECTION, SOLUTION INTRAVENOUS at 07:22

## 2020-01-01 RX ADMIN — LIDOCAINE HYDROCHLORIDE 80 MG: 20 INJECTION, SOLUTION INTRAVENOUS at 07:38

## 2020-01-01 RX ADMIN — CIPROFLOXACIN 400 MG: 2 INJECTION, SOLUTION INTRAVENOUS at 10:36

## 2020-01-01 RX ADMIN — ACYCLOVIR SODIUM 685 MG: 50 INJECTION, SOLUTION INTRAVENOUS at 05:21

## 2020-01-01 RX ADMIN — CEFAZOLIN 2 G: 330 INJECTION, POWDER, FOR SOLUTION INTRAMUSCULAR; INTRAVENOUS at 10:14

## 2020-01-01 RX ADMIN — CEFAZOLIN SODIUM 2 G: 2 SOLUTION INTRAVENOUS at 07:45

## 2020-01-01 RX ADMIN — INSULIN LISPRO 1 UNITS: 100 INJECTION, SOLUTION INTRAVENOUS; SUBCUTANEOUS at 17:27

## 2020-01-01 RX ADMIN — ACETAMINOPHEN 650 MG: 325 TABLET ORAL at 05:25

## 2020-01-01 RX ADMIN — CETIRIZINE HYDROCHLORIDE 10 MG: 10 TABLET, FILM COATED ORAL at 08:18

## 2020-01-01 RX ADMIN — INSULIN LISPRO 2 UNITS: 100 INJECTION, SOLUTION INTRAVENOUS; SUBCUTANEOUS at 22:39

## 2020-01-01 RX ADMIN — BACITRACIN ZINC AND POLYMYXIN B SULFATE: at 09:00

## 2020-01-01 RX ADMIN — IMMUNE GLOBULIN INTRAVENOUS (HUMAN) 10 G: 5 INJECTION, SOLUTION INTRAVENOUS at 13:36

## 2020-01-01 RX ADMIN — IMMUNE GLOBULIN INTRAVENOUS (HUMAN) 20 G: 5 INJECTION, SOLUTION INTRAVENOUS at 15:58

## 2020-01-01 RX ADMIN — Medication 10 ML: at 08:20

## 2020-01-01 RX ADMIN — INSULIN LISPRO 1 UNITS: 100 INJECTION, SOLUTION INTRAVENOUS; SUBCUTANEOUS at 17:00

## 2020-01-01 RX ADMIN — PIPERACILLIN AND TAZOBACTAM 3.38 G: 3; .375 INJECTION, POWDER, LYOPHILIZED, FOR SOLUTION INTRAVENOUS at 21:23

## 2020-01-01 RX ADMIN — FENTANYL CITRATE 50 MCG: 50 INJECTION INTRAMUSCULAR; INTRAVENOUS at 07:38

## 2020-01-01 RX ADMIN — ONDANSETRON 4 MG: 2 INJECTION INTRAMUSCULAR; INTRAVENOUS at 10:51

## 2020-01-01 RX ADMIN — MIDAZOLAM 1 MG: 1 INJECTION INTRAMUSCULAR; INTRAVENOUS at 11:41

## 2020-01-01 RX ADMIN — PROPOFOL 110 MG: 10 INJECTION, EMULSION INTRAVENOUS at 07:38

## 2020-01-01 RX ADMIN — ROSUVASTATIN CALCIUM 40 MG: 10 TABLET, FILM COATED ORAL at 17:45

## 2020-01-01 RX ADMIN — ACETAMINOPHEN 1000 MG: 500 TABLET, COATED ORAL at 18:14

## 2020-01-01 RX ADMIN — SODIUM CHLORIDE: 9 INJECTION, SOLUTION INTRAVENOUS at 11:01

## 2020-01-01 RX ADMIN — ACETAMINOPHEN 1000 MG: 500 TABLET, COATED ORAL at 11:56

## 2020-01-01 RX ADMIN — Medication 1250 MG: at 17:45

## 2020-01-01 RX ADMIN — FAMOTIDINE 20 MG: 10 INJECTION, SOLUTION INTRAVENOUS at 08:19

## 2020-01-01 RX ADMIN — ACETAMINOPHEN 1000 MG: 500 TABLET, COATED ORAL at 23:54

## 2020-01-01 RX ADMIN — ROCURONIUM BROMIDE 10 MG: 10 INJECTION INTRAVENOUS at 07:44

## 2020-01-01 RX ADMIN — ACETAMINOPHEN 650 MG: 650 SUPPOSITORY RECTAL at 12:46

## 2020-01-01 RX ADMIN — INSULIN HUMAN 6 UNITS: 100 INJECTION, SOLUTION PARENTERAL at 17:45

## 2020-01-01 RX ADMIN — ASPIRIN 81 MG 81 MG: 81 TABLET ORAL at 08:41

## 2020-01-01 RX ADMIN — Medication 10 ML: at 09:00

## 2020-01-01 RX ADMIN — FENTANYL CITRATE 50 MCG: 50 INJECTION INTRAMUSCULAR; INTRAVENOUS at 11:17

## 2020-01-01 RX ADMIN — ROSUVASTATIN CALCIUM 40 MG: 10 TABLET, FILM COATED ORAL at 17:18

## 2020-01-01 RX ADMIN — FENTANYL CITRATE 50 MCG: 50 INJECTION INTRAMUSCULAR; INTRAVENOUS at 08:58

## 2020-01-01 RX ADMIN — ACYCLOVIR SODIUM 685 MG: 50 INJECTION, SOLUTION INTRAVENOUS at 04:08

## 2020-01-01 RX ADMIN — HEPARIN SODIUM 5000 UNITS: 5000 INJECTION INTRAVENOUS; SUBCUTANEOUS at 13:51

## 2020-01-01 RX ADMIN — IOPAMIDOL 75 ML: 755 INJECTION, SOLUTION INTRAVENOUS at 14:59

## 2020-01-01 RX ADMIN — ACETAMINOPHEN 1000 MG: 500 TABLET, COATED ORAL at 13:19

## 2020-01-01 RX ADMIN — INSULIN LISPRO 2 UNITS: 100 INJECTION, SOLUTION INTRAVENOUS; SUBCUTANEOUS at 02:00

## 2020-01-01 RX ADMIN — SODIUM CHLORIDE: 9 INJECTION, SOLUTION INTRAVENOUS at 08:40

## 2020-01-01 RX ADMIN — Medication 3 MG: at 10:51

## 2020-01-01 RX ADMIN — INSULIN LISPRO 4 UNITS: 100 INJECTION, SOLUTION INTRAVENOUS; SUBCUTANEOUS at 12:33

## 2020-01-01 RX ADMIN — Medication 10 ML: at 20:32

## 2020-01-01 RX ADMIN — FAMOTIDINE 20 MG: 20 TABLET, FILM COATED ORAL at 09:00

## 2020-01-01 RX ADMIN — INSULIN LISPRO 1 UNITS: 100 INJECTION, SOLUTION INTRAVENOUS; SUBCUTANEOUS at 21:27

## 2020-01-01 RX ADMIN — ACETAMINOPHEN 1000 MG: 10 INJECTION, SOLUTION INTRAVENOUS at 18:04

## 2020-01-01 RX ADMIN — SODIUM CHLORIDE 1000 ML: 9 INJECTION, SOLUTION INTRAVENOUS at 00:05

## 2020-01-01 RX ADMIN — SODIUM CHLORIDE: 9 INJECTION, SOLUTION INTRAVENOUS at 12:42

## 2020-01-01 RX ADMIN — ACETAMINOPHEN 1000 MG: 500 TABLET, COATED ORAL at 05:32

## 2020-01-01 RX ADMIN — SODIUM CHLORIDE, SODIUM LACTATE, POTASSIUM CHLORIDE, AND CALCIUM CHLORIDE: 600; 310; 30; 20 INJECTION, SOLUTION INTRAVENOUS at 09:30

## 2020-01-01 RX ADMIN — IOHEXOL 50 ML: 240 INJECTION, SOLUTION INTRATHECAL; INTRAVASCULAR; INTRAVENOUS; ORAL at 07:22

## 2020-01-01 RX ADMIN — Medication 10 ML: at 21:27

## 2020-01-01 RX ADMIN — HEPARIN SODIUM 5000 UNITS: 5000 INJECTION INTRAVENOUS; SUBCUTANEOUS at 05:33

## 2020-01-01 RX ADMIN — SODIUM CHLORIDE: 9 INJECTION, SOLUTION INTRAVENOUS at 16:12

## 2020-01-01 RX ADMIN — PIPERACILLIN AND TAZOBACTAM 3.38 G: 3; .375 INJECTION, POWDER, LYOPHILIZED, FOR SOLUTION INTRAVENOUS at 20:18

## 2020-01-01 RX ADMIN — INSULIN LISPRO 3 UNITS: 100 INJECTION, SOLUTION INTRAVENOUS; SUBCUTANEOUS at 12:19

## 2020-01-01 RX ADMIN — FAMOTIDINE 20 MG: 10 INJECTION, SOLUTION INTRAVENOUS at 08:33

## 2020-01-01 RX ADMIN — FENTANYL CITRATE 50 MCG: 50 INJECTION INTRAMUSCULAR; INTRAVENOUS at 08:07

## 2020-01-01 RX ADMIN — INSULIN LISPRO 1 UNITS: 100 INJECTION, SOLUTION INTRAVENOUS; SUBCUTANEOUS at 17:09

## 2020-01-01 RX ADMIN — TBO-FILGRASTIM 300 MCG: 300 INJECTION, SOLUTION SUBCUTANEOUS at 12:51

## 2020-01-01 RX ADMIN — VANCOMYCIN HYDROCHLORIDE 1000 MG: 1 INJECTION, POWDER, LYOPHILIZED, FOR SOLUTION INTRAVENOUS at 10:19

## 2020-01-01 RX ADMIN — IOPAMIDOL 75 ML: 755 INJECTION, SOLUTION INTRAVENOUS at 09:52

## 2020-01-01 RX ADMIN — Medication 10 ML: at 09:15

## 2020-01-01 RX ADMIN — LISINOPRIL 20 MG: 20 TABLET ORAL at 09:00

## 2020-01-01 RX ADMIN — IOHEXOL 50 ML: 240 INJECTION, SOLUTION INTRATHECAL; INTRAVASCULAR; INTRAVENOUS; ORAL at 08:52

## 2020-01-01 RX ADMIN — INSULIN LISPRO 1 UNITS: 100 INJECTION, SOLUTION INTRAVENOUS; SUBCUTANEOUS at 08:19

## 2020-01-01 RX ADMIN — SODIUM CHLORIDE: 9 INJECTION, SOLUTION INTRAVENOUS at 06:07

## 2020-01-01 RX ADMIN — INSULIN LISPRO 1 UNITS: 100 INJECTION, SOLUTION INTRAVENOUS; SUBCUTANEOUS at 20:20

## 2020-01-01 RX ADMIN — ACETAMINOPHEN 1000 MG: 500 TABLET, COATED ORAL at 23:00

## 2020-01-01 RX ADMIN — ENOXAPARIN SODIUM 40 MG: 40 INJECTION SUBCUTANEOUS at 20:18

## 2020-01-01 RX ADMIN — SODIUM CHLORIDE 500 ML: 9 INJECTION, SOLUTION INTRAVENOUS at 17:50

## 2020-01-01 RX ADMIN — INSULIN HUMAN 6 UNITS: 100 INJECTION, SOLUTION PARENTERAL at 20:32

## 2020-01-01 RX ADMIN — ACYCLOVIR SODIUM 685 MG: 50 INJECTION, SOLUTION INTRAVENOUS at 17:21

## 2020-01-01 ASSESSMENT — PULMONARY FUNCTION TESTS
PIF_VALUE: 21
PIF_VALUE: 21
PIF_VALUE: 3
PIF_VALUE: 22
PIF_VALUE: 18
PIF_VALUE: 21
PIF_VALUE: 1
PIF_VALUE: 19
PIF_VALUE: 17
PIF_VALUE: 20
PIF_VALUE: 23
PIF_VALUE: 23
PIF_VALUE: 20
PIF_VALUE: 0
PIF_VALUE: 24
PIF_VALUE: 13
PIF_VALUE: 15
PIF_VALUE: 24
PIF_VALUE: 12
PIF_VALUE: 1
PIF_VALUE: 19
PIF_VALUE: 23
PIF_VALUE: 21
PIF_VALUE: 21
PIF_VALUE: 20
PIF_VALUE: 20
PIF_VALUE: 21
PIF_VALUE: 23
PIF_VALUE: 23
PIF_VALUE: 20
PIF_VALUE: 21
PIF_VALUE: 20
PIF_VALUE: 24
PIF_VALUE: 21
PIF_VALUE: 21
PIF_VALUE: 19
PIF_VALUE: 21
PIF_VALUE: 23
PIF_VALUE: 21
PIF_VALUE: 20
PIF_VALUE: 21
PIF_VALUE: 0
PIF_VALUE: 20
PIF_VALUE: 0
PIF_VALUE: 23
PIF_VALUE: 19
PIF_VALUE: 19
PIF_VALUE: 20
PIF_VALUE: 20
PIF_VALUE: 12
PIF_VALUE: 24
PIF_VALUE: 21
PIF_VALUE: 22
PIF_VALUE: 18
PIF_VALUE: 21
PIF_VALUE: 6
PIF_VALUE: 20
PIF_VALUE: 24
PIF_VALUE: 19
PIF_VALUE: 21
PIF_VALUE: 21
PIF_VALUE: 4
PIF_VALUE: 24
PIF_VALUE: 19
PIF_VALUE: 20
PIF_VALUE: 21
PIF_VALUE: 24
PIF_VALUE: 21
PIF_VALUE: 23
PIF_VALUE: 26
PIF_VALUE: 23
PIF_VALUE: 15
PIF_VALUE: 21
PIF_VALUE: 20
PIF_VALUE: 15
PIF_VALUE: 8
PIF_VALUE: 21
PIF_VALUE: 23
PIF_VALUE: 2
PIF_VALUE: 21
PIF_VALUE: 20
PIF_VALUE: 23
PIF_VALUE: 18
PIF_VALUE: 21
PIF_VALUE: 21
PIF_VALUE: 15
PIF_VALUE: 20
PIF_VALUE: 22
PIF_VALUE: 0
PIF_VALUE: 1
PIF_VALUE: 19
PIF_VALUE: 0
PIF_VALUE: 25
PIF_VALUE: 4
PIF_VALUE: 21
PIF_VALUE: 21
PIF_VALUE: 20
PIF_VALUE: 23
PIF_VALUE: 0
PIF_VALUE: 19
PIF_VALUE: 21
PIF_VALUE: 0
PIF_VALUE: 15
PIF_VALUE: 23
PIF_VALUE: 23
PIF_VALUE: 0
PIF_VALUE: 20
PIF_VALUE: 18
PIF_VALUE: 23
PIF_VALUE: 21
PIF_VALUE: 8
PIF_VALUE: 0
PIF_VALUE: 20
PIF_VALUE: 18
PIF_VALUE: 17
PIF_VALUE: 20
PIF_VALUE: 23
PIF_VALUE: 21
PIF_VALUE: 24
PIF_VALUE: 22
PIF_VALUE: 23
PIF_VALUE: 24
PIF_VALUE: 23
PIF_VALUE: 0
PIF_VALUE: 18
PIF_VALUE: 20
PIF_VALUE: 21
PIF_VALUE: 14
PIF_VALUE: 21
PIF_VALUE: 21
PIF_VALUE: 23
PIF_VALUE: 21
PIF_VALUE: 17
PIF_VALUE: 25
PIF_VALUE: 23
PIF_VALUE: 22
PIF_VALUE: 20
PIF_VALUE: 24
PIF_VALUE: 22
PIF_VALUE: 21
PIF_VALUE: 19
PIF_VALUE: 19
PIF_VALUE: 21
PIF_VALUE: 24
PIF_VALUE: 3
PIF_VALUE: 21
PIF_VALUE: 23
PIF_VALUE: 23
PIF_VALUE: 4
PIF_VALUE: 19
PIF_VALUE: 25
PIF_VALUE: 19
PIF_VALUE: 20
PIF_VALUE: 19
PIF_VALUE: 21
PIF_VALUE: 25
PIF_VALUE: 22
PIF_VALUE: 23
PIF_VALUE: 17
PIF_VALUE: 18
PIF_VALUE: 1
PIF_VALUE: 0
PIF_VALUE: 20
PIF_VALUE: 21
PIF_VALUE: 0
PIF_VALUE: 15
PIF_VALUE: 19
PIF_VALUE: 21
PIF_VALUE: 19
PIF_VALUE: 19
PIF_VALUE: 20
PIF_VALUE: 20
PIF_VALUE: 21
PIF_VALUE: 21
PIF_VALUE: 20
PIF_VALUE: 21
PIF_VALUE: 24
PIF_VALUE: 24
PIF_VALUE: 1
PIF_VALUE: 21
PIF_VALUE: 21
PIF_VALUE: 24
PIF_VALUE: 9
PIF_VALUE: 20
PIF_VALUE: 21
PIF_VALUE: 19
PIF_VALUE: 21
PIF_VALUE: 20
PIF_VALUE: 19
PIF_VALUE: 18
PIF_VALUE: 20
PIF_VALUE: 0
PIF_VALUE: 14
PIF_VALUE: 20
PIF_VALUE: 7
PIF_VALUE: 21
PIF_VALUE: 23
PIF_VALUE: 20
PIF_VALUE: 22
PIF_VALUE: 20
PIF_VALUE: 24
PIF_VALUE: 23
PIF_VALUE: 21
PIF_VALUE: 3
PIF_VALUE: 20
PIF_VALUE: 18
PIF_VALUE: 20
PIF_VALUE: 24
PIF_VALUE: 1
PIF_VALUE: 27
PIF_VALUE: 21
PIF_VALUE: 13
PIF_VALUE: 20
PIF_VALUE: 23
PIF_VALUE: 21
PIF_VALUE: 20
PIF_VALUE: 23
PIF_VALUE: 19
PIF_VALUE: 21
PIF_VALUE: 23
PIF_VALUE: 21
PIF_VALUE: 23
PIF_VALUE: 21

## 2020-01-01 ASSESSMENT — PAIN SCALES - GENERAL
PAINLEVEL_OUTOF10: 0

## 2020-01-01 ASSESSMENT — ENCOUNTER SYMPTOMS
SINUS PRESSURE: 0
SORE THROAT: 0
FACIAL SWELLING: 0
DIARRHEA: 0
VOMITING: 0
NAUSEA: 0
SINUS PAIN: 0
SHORTNESS OF BREATH: 0
COLOR CHANGE: 0
ABDOMINAL PAIN: 0
COUGH: 1

## 2020-01-01 ASSESSMENT — PAIN - FUNCTIONAL ASSESSMENT
PAIN_FUNCTIONAL_ASSESSMENT: 0-10
PAIN_FUNCTIONAL_ASSESSMENT: 0-10

## 2020-01-01 ASSESSMENT — PAIN SCALES - WONG BAKER
WONGBAKER_NUMERICALRESPONSE: 0

## 2020-01-22 NOTE — PROGRESS NOTES
Subjective:      Patient ID: Keri Smith is a 76 y.o. male. HPI Elderly man referred by Salvatore Aguirre MD establishment of a vascular relationship. He has been seen long-term by Dr. Rigo Jacobs for chronic venous insufficiency. Long ago at an undefined date he had bilateral ankle ulcerations which were healed with compression wraps and he has remained at compression stockings on a daily basis since that time seeing her on a yearly basis. No history of DVT or SVT.   No venous interventions in the past.    Past Medical History:   Diagnosis Date    Anxiety     Blind     Blood disorder     Cancer [facility] [address]     chronic lymphoid leukemia    Diabetes mellitus ([facility] [address]     Encounter for long-term (current) use of high-risk medication 2/8/2016    Glaucoma     Hyperlipidemia     Hypertension      Past Surgical History:   Procedure Laterality Date    APPENDECTOMY      FINE NEEDLE ASPIRATION  2006    HAND SURGERY Left 10/16/14    excision left thumb lesion with skin graft/trigger finger release left thumb and 5th finger    LYMPH NODE DISSECTION  2008    PRE-MALIGNANT / BENIGN SKIN LESION EXCISION      : excision 3.4 cm right shoulder lesion and 1.7 cm incisional biopsy left thumb    TUNNELED VENOUS PORT PLACEMENT Left 2006    TUNNELED VENOUS PORT PLACEMENT  2006     No Known Allergies  Current Outpatient Medications   Medication Sig Dispense Refill    acetaminophen (APAP EXTRA STRENGTH) 500 MG tablet Take 1 tablet by mouth every 6 hours as needed for Pain or Fever 30 tablet 0    glimepiride (AMARYL) 1 MG tablet Take 1 mg by mouth every morning (before breakfast)      Insulin Degludec (TRESIBA FLEXTOUCH) 100 UNIT/ML SOPN Inject into the skin      venlafaxine (EFFEXOR) 37.5 MG tablet Take 37.5 mg by mouth 3 times daily      Venetoclax (VENCLEXTA PO) Take by mouth      Liraglutide (VICTOZA) 18 MG/3ML SOPN SC injection Inject 0.6 mg into the skin daily      ibrutinib (IMBRUVICA) 140 MG chemo capsule Take 3 po daily at the same time every day 90 capsule 3    rosuvastatin (CRESTOR) 40 MG tablet Take 40 mg by mouth every evening      Dapagliflozin-Metformin HCl ER 5-500 MG TB24 Take 1 tablet by mouth daily 30 tablet 0    lisinopril (PRINIVIL;ZESTRIL) 10 MG tablet Take 20 mg by mouth daily       MULTIPLE VITAMIN PO Take  by mouth. Take 1 vitamin a day. Senior Vitamin      desloratadine (CLARINEX) 5 MG tablet Take 5 mg by mouth daily.  pravastatin (PRAVACHOL) 80 MG tablet Take 80 mg by mouth daily.  fenofibrate (TRICOR) 145 MG tablet Take 145 mg by mouth daily.  aspirin 81 MG tablet Take 81 mg by mouth daily.  glipiZIDE (GLUCOTROL) 2.5 MG CR tablet Take 2.5 mg by mouth daily. No current facility-administered medications for this visit.       Facility-Administered Medications Ordered in Other Visits   Medication Dose Route Frequency Provider Last Rate Last Dose    0.9 % sodium chloride bolus  250 mL Intravenous PRN Ame Jasmine MD        heparin flush 100 UNIT/ML injection 500 Units  500 Units Intercatheter PRN Ame Jasmine MD   500 Units at 06/09/15 1101    sodium chloride (PF) 0.9 % injection 20 mL  20 mL Intravenous PRN Ame Jasmine MD   20 mL at 06/09/15 1101     Social History     Socioeconomic History    Marital status:      Spouse name: Not on file    Number of children: Not on file    Years of education: Not on file    Highest education level: Not on file   Occupational History    Not on file   Social Needs    Financial resource strain: Not on file    Food insecurity:     Worry: Not on file     Inability: Not on file    Transportation needs:     Medical: Not on file     Non-medical: Not on file   Tobacco Use    Smoking status: Never Smoker    Smokeless tobacco: Never Used   Substance and Sexual Activity    Alcohol use: Yes     Comment: rare use    Drug use: No    Sexual activity: Not on file   Lifestyle    Physical activity:     Days per week: Not on file     Minutes per session: Not on file    Stress: Not on file   Relationships    Social connections:     Talks on phone: Not on file     Gets together: Not on file     Attends [employer] service: Not on file     Active member of club or organization: Not on file     Attends meetings of clubs or organizations: Not on file     Relationship status: Not on file    Intimate partner violence:     Fear of current or ex partner: Not on file     Emotionally abused: Not on file     Physically abused: Not on file     Forced sexual activity: Not on file   Other Topics Concern    Not on file   Social History Narrative    Not on file     Family History   Problem Relation Age of Onset    Breast Cancer Sister     Coronary Art Dis Mother     Diabetes Mother     Elevated Lipids Mother     Hypertension Mother     Obesity Mother     Obesity Brother          Review of Systems   Cardiovascular: Positive for leg swelling. All other systems reviewed and are negative. 15 point review of systems confirmed personally by this physician    Objective:   Physical Exam  Vitals signs and nursing note reviewed. Constitutional:       Appearance: He is obese. HENT:      Head: Normocephalic and atraumatic. Nose: Nose normal.      Mouth/Throat:      Mouth: Mucous membranes are moist.      Pharynx: Oropharynx is clear. Neck:      Musculoskeletal: Neck supple. Cardiovascular:      Rate and Rhythm: Normal rate and regular rhythm. Heart sounds: Normal heart sounds. Pulmonary:      Effort: Pulmonary effort is normal.      Breath sounds: Normal breath sounds. Abdominal:      Palpations: Abdomen is soft. There is no mass. Musculoskeletal:      Right lower leg: No edema. Left lower leg: No edema. Comments: Well controlled edema bilaterally   Skin:     General: Skin is warm and dry. Capillary Refill: Capillary refill takes less than 2 seconds.       Comments: Hemosiderin discoloration B ankles and feet Neurological:      General: No focal deficit present. Mental Status: He is alert and oriented to person, place, and time. Cranial Nerves: No cranial nerve deficit. Sensory: No sensory deficit. Motor: No weakness. Coordination: Coordination normal.      Gait: Gait normal.   Psychiatric:         Mood and Affect: Mood normal.         Behavior: Behavior normal.         Thought Content: Thought content normal.         Judgment: Judgment normal.       Pulses:   R bruit  L bruit   2   carotid 2    2   brachial 2    2   radial 2    2   femoral 2    2   popliteal 2    2   posterior tibial 2    2   dorsalis pedis 2    na   bypass graft na        Assessment:      1) Chronic venous insufficiency B legs with healed ulcers (CEAP 5) - well controlled  2) Blindness  3) HTN  4) DM  5) Hyperlipidemia      Plan:       Continue current regimen with knee-high 20/30 mmHg compression stockings on a daily basis. Elevate legs as possible. May continue to obtain stockings which are of good quality from their online source. Follow-up with me as needed in the future.

## 2020-03-03 NOTE — ED TRIAGE NOTES
Pt came in via squad with altered mental status after being found \"wandering around house\". Patient's wife reports that he has \"CLL\" and that he \"switched to a different type of chemotherapy last night\". Patient's wife reports that pt is \"much more confused than normal\" and that \"this sometimes happens when he has a fever\". Pt alert on arrival but not oriented. Patient's blood sugar was \"343\" per EMS.

## 2020-03-03 NOTE — H&P
PRN  acetaminophen (TYLENOL) tablet 650 mg, 650 mg, Oral, Q6H PRN **OR** acetaminophen (TYLENOL) suppository 650 mg, 650 mg, Rectal, Q6H PRN  enoxaparin (LOVENOX) injection 40 mg, 40 mg, Subcutaneous, Nightly  0.9 % sodium chloride infusion, , Intravenous, Continuous  polyethylene glycol (GLYCOLAX) packet 17 g, 17 g, Oral, Daily PRN  sennosides-docusate sodium (SENOKOT-S) 8.6-50 MG tablet 2 tablet, 2 tablet, Oral, Daily PRN  ondansetron (ZOFRAN) injection 4 mg, 4 mg, Intravenous, Q6H PRN  piperacillin-tazobactam (ZOSYN) 3.375 g in dextrose 5 % 100 mL IVPB extended infusion (mini-bag), 3.375 g, Intravenous, Q8H  acetaminophen (TYLENOL) tablet 650 mg, 650 mg, Oral, Once  diphenhydrAMINE (BENADRYL) injection 25 mg, 25 mg, Intravenous, Once  aspirin chewable tablet 81 mg, 81 mg, Oral, Daily  cetirizine (ZYRTEC) tablet 10 mg, 10 mg, Oral, Daily  rosuvastatin (CRESTOR) tablet 40 mg, 40 mg, Oral, QPM  insulin lispro (HUMALOG) injection vial 0-6 Units, 0-6 Units, Subcutaneous, TID WC  insulin lispro (HUMALOG) injection vial 0-3 Units, 0-3 Units, Subcutaneous, Nightly  famotidine (PEPCID) injection 20 mg, 20 mg, Intravenous, Daily  vancomycin (VANCOCIN) intermittent dosing (placeholder), , Other, RX Placeholder  [START ON 3/4/2020] acyclovir (ZOVIRAX) 685 mg in dextrose 5 % 100 mL IVPB, 10 mg/kg (Adjusted), Intravenous, Q12H  acyclovir (ZOVIRAX) 685 mg in dextrose 5 % 100 mL IVPB, 10 mg/kg (Adjusted), Intravenous, Once  acetaminophen (OFIRMEV) infusion 1,000 mg, 1,000 mg, Intravenous, Q6H PRN  Facility-Administered Medications Ordered in Other Encounters: 0.9 % sodium chloride bolus, 250 mL, Intravenous, PRN  heparin flush 100 UNIT/ML injection 500 Units, 500 Units, Intercatheter, PRN  sodium chloride (PF) 0.9 % injection 20 mL, 20 mL, Intravenous, PRN  Allergies:  Patient has no known allergies.     Social History:      Social History     Socioeconomic History    Marital status:      Spouse name: Not on file    Number of children: Not on file    Years of education: Not on file    Highest education level: Not on file   Occupational History    Not on file   Social Needs    Financial resource strain: Not on file    Food insecurity:     Worry: Not on file     Inability: Not on file    Transportation needs:     Medical: Not on file     Non-medical: Not on file   Tobacco Use    Smoking status: Never Smoker    Smokeless tobacco: Never Used   Substance and Sexual Activity    Alcohol use: Yes     Comment: rare use    Drug use: No    Sexual activity: Not on file   Lifestyle    Physical activity:     Days per week: Not on file     Minutes per session: Not on file    Stress: Not on file   Relationships    Social connections:     Talks on phone: Not on file     Gets together: Not on file     Attends [employer] service: Not on file     Active member of club or organization: Not on file     Attends meetings of clubs or organizations: Not on file     Relationship status: Not on file    Intimate partner violence:     Fear of current or ex partner: Not on file     Emotionally abused: Not on file     Physically abused: Not on file     Forced sexual activity: Not on file   Other Topics Concern    Not on file   Social History Narrative    Not on file          Family History:         Problem Relation Age of Onset    Breast Cancer Sister     Coronary Art Dis Mother     Diabetes Mother     Elevated Lipids Mother     Hypertension Mother     Obesity Mother     Obesity Brother      REVIEW OF SYSTEMS:    ROS per the HPI other wise 10 point ROS negative   PHYSICAL EXAM:      Vitals:  BP (!) 147/66   Pulse 84   Temp 103 °F (39.4 °C) (Oral)   Resp 22   Ht 5' 4\" (1.626 m)   Wt 181 lb 7 oz (82.3 kg)   SpO2 91%   BMI 31.14 kg/m²     CONSTITUTIONAL:  Confused-he gets confused with infections a lot   EYES: blind   ENT:  Poor dentition   NECK:  Supple, symmetrical, trachea midline, no adenopathy, thyroid symmetric, not enlarged left IJ central venous catheter via for terminating over the SVC. Left axillary surgical clips. Shallow inspiratory effort with basilar atelectasis. Ct Head Wo Contrast    Result Date: 3/3/2020  EXAMINATION: CT OF THE HEAD WITHOUT CONTRAST  3/3/2020 5:08 am TECHNIQUE: CT of the head was performed without the administration of intravenous contrast. Dose modulation, iterative reconstruction, and/or weight based adjustment of the mA/kV was utilized to reduce the radiation dose to as low as reasonably achievable. COMPARISON: CT head 12/24/2018 HISTORY: ORDERING SYSTEM PROVIDED HISTORY: ams TECHNOLOGIST PROVIDED HISTORY: Reason for exam:->ams Has a \"code stroke\" or \"stroke alert\" been called? ->No Reason for Exam: ams FINDINGS: BRAIN/VENTRICLES: There is no acute intracranial hemorrhage, mass effect or midline shift. No abnormal extra-axial fluid collection. The gray-white differentiation is maintained without evidence of an acute infarct. There is no evidence of hydrocephalus. No substantial change in subcortical low attenuation in the posterior left frontal lobe. Stable brain volume, sulci and ventricles. ORBITS: Bilateral phthisis bulbi. SINUSES: The visualized paranasal sinuses and mastoid air cells demonstrate no acute abnormality. SOFT TISSUES/SKULL:  No acute abnormality of the visualized skull or soft tissues. No acute intracranial abnormality.      Problem List  Patient Active Problem List   Diagnosis    [facility] lymphocytic [facility] ([facility] [address]    Hypogammaglobulinemia (HCC)    CLL (chronic lymphoid leukemia) in relapse ([facility] [address] [facility]    Congenital blindness    Encounter for long-term (current) use of high-risk medication    Chronic lymphocytic leukemia of B-cell type in relapse ([facility] [address] [facility]    High risk medication use    Fever of unknown origin    DMII (diabetes mellitus, type 2) ([facility] [address] [facility]    Hypertension    Hyperlipidemia    Pneumonia due to organism    CAP (community acquired pneumonia)    Fever and chills

## 2020-03-03 NOTE — CONSULTS
Clinical Pharmacy Note  Vancomycin Consult    Alaina Cormier is a 76 y.o. male ordered Vancomycin for sepsis; consult received from Dr. George Castaneda to manage therapy. Also receiving Zosyn. Patient Active Problem List   Diagnosis    [facility]ocytic leukemia ([facility]    Hypogammaglobulinemia (HCC)    CLL (chronic lymphoid leukemia) in relapse ([facility]    Congenital blindness    Encounter for long-term (current) use of high-risk medication    Chronic lymphocytic leukemia of B-cell type in relapse ([facility]    High risk medication use    Fever of unknown origin    DMII (diabetes mellitus, type 2) ([facility]    Hypertension    Hyperlipidemia    Pneumonia due to organism    CAP (community acquired pneumonia)    Fever and chills    Status post chemotherapy    CLL [facility]chronic lymphocytic leukemia) ([facility]       Allergies:  Patient has no known allergies. Temp max:  Temp (24hrs), Av.6 °F (38.7 °C), Min:100.2 °F (37.9 °C), Max:103 °F (39.4 °C)      Recent Labs     20  0537   WBC 7.4       Recent Labs     20  0537   BUN 28*   CREATININE 1.5*       No intake or output data in the 24 hours ending 20 1223    Culture Results:  pending    Ht Readings from Last 1 Encounters:   20 5' 4\" (1.626 m)        Wt Readings from Last 1 Encounters:   20 181 lb 7 oz (82.3 kg)         Estimated Creatinine Clearance: 42 mL/min (A) (based on SCr of 1.5 mg/dL (H)). Assessment/Plan:  Patient received vancomycin 1000 mg IV once. Will get a random level in the AM. Regimen projects a trough level of 15-20 mg/L. Timing of trough level will be determined based on culture results, renal function, and clinical response. Thank you for the consult. Will continue to follow.   Nori Perea [facility],3/3/2020,12:24 PM

## 2020-03-03 NOTE — ED PROVIDER NOTES
[facility]  EMERGENCY DEPARTMENTENCOUNTER      Pt Name: Kevin Camejo  MRN: [id number]  [place] 1945  Date ofevaluation: 3/3/2020  Provider: Mee Crum MD    CHIEF COMPLAINT       Chief Complaint   Patient presents with    Altered Mental Status     Pt came in via squad with altered mental status after being found \"wandering around house\". Pt denies any pain at this time. HISTORY OF PRESENT ILLNESS   (Location/Symptom, Timing/Onset,Context/Setting, Quality, Duration, Modifying Factors, Severity)  Note limiting factors. Kevin Camejo is a 76 y.o. male who presents to the emergency department for evaluation of altered mental status and hypoglycemia. Patient has a history of CLL and recently was started on new chemotherapy last session was approximate 2 days ago. . Wife reports that the patient began having fevers the previous day. They spoke to the on-call oncologist who informed them that her fever could be normal after the chemotherapy, she states the patient was not complaining of any pain or infectious symptoms. This morning the patient was found wandering the house confused. The wife states that the patient has been confused when he had fevers in the past.  EMS was called and the patient was noted to be hypoglycemic with a blood glucose of 298. Prior to the ED the patient is ANO x1. He is following commands and moving all extremities purposefully. Patient is noted to be febrile tachycardic and tachypneic on arrival to the ED. He has no complaints of pain. HPI    NursingNotes were reviewed. REVIEW OF SYSTEMS    (2-9 systems for level 4, 10 or more for level 5)     Review of Systems   Unable to perform ROS: Mental status change       Except as noted above the remainder of the review of systems was reviewed and negative.        PAST MEDICAL HISTORY     Past Medical History:   Diagnosis Date    Anxiety     Blind     Blood disorder     Cancer [facility])     chronic lymphoid leukemia    Diabetes mellitus ([facility]     Encounter for long-term (current) use of high-risk medication 2/8/2016    Glaucoma     Hyperlipidemia     Hypertension          SURGICALHISTORY       Past Surgical History:   Procedure Laterality Date    APPENDECTOMY      FINE NEEDLE ASPIRATION  2006    HAND SURGERY Left 10/16/14    excision left thumb lesion with skin graft/trigger finger release left thumb and 5th finger    LYMPH NODE DISSECTION  2008    PRE-MALIGNANT / BENIGN SKIN LESION EXCISION      : excision 3.4 cm right shoulder lesion and 1.7 cm incisional biopsy left thumb    TUNNELED VENOUS PORT PLACEMENT Left 2006    TUNNELED VENOUS PORT PLACEMENT  2006         CURRENT MEDICATIONS       Previous Medications    ACETAMINOPHEN (APAP EXTRA STRENGTH) 500 MG TABLET    Take 1 tablet by mouth every 6 hours as needed for Pain or Fever    ASPIRIN 81 MG TABLET    Take 81 mg by mouth daily. DAPAGLIFLOZIN-METFORMIN HCL ER 5-500 MG TB24    Take 1 tablet by mouth daily    DESLORATADINE (CLARINEX) 5 MG TABLET    Take 5 mg by mouth daily. FENOFIBRATE (TRICOR) 145 MG TABLET    Take 145 mg by mouth daily. GLIMEPIRIDE (AMARYL) 1 MG TABLET    Take 1 mg by mouth every morning (before breakfast)    GLIPIZIDE (GLUCOTROL) 2.5 MG CR TABLET    Take 2.5 mg by mouth daily. IBRUTINIB (IMBRUVICA) 140 MG CHEMO CAPSULE    Take 3 po daily at the same time every day    INSULIN DEGLUDEC (TRESIBA FLEXTOUCH) 100 UNIT/ML SOPN    Inject into the skin    LIRAGLUTIDE (VICTOZA) 18 MG/3ML SOPN SC INJECTION    Inject 0.6 mg into the skin daily    LISINOPRIL (PRINIVIL;ZESTRIL) 10 MG TABLET    Take 20 mg by mouth daily     MULTIPLE VITAMIN PO    Take  by mouth. Take 1 vitamin a day. Senior Vitamin    PRAVASTATIN (PRAVACHOL) 80 MG TABLET    Take 80 mg by mouth daily.     ROSUVASTATIN (CRESTOR) 40 MG TABLET    Take 40 mg by mouth every evening    VENETOCLAX (VENCLEXTA PO)    Take by mouth    VENLAFAXINE (EFFEXOR) 94 % 5' 4\" (1.626 m) 181 lb 7 oz (82.3 kg)       Physical Exam  Vitals signs and nursing note reviewed. Constitutional:       General: He is not in acute distress. Appearance: He is well-developed. He is not ill-appearing, toxic-appearing or diaphoretic. HENT:      Head: Normocephalic and atraumatic. Mouth/Throat:      Mouth: Mucous membranes are moist.      Pharynx: Oropharynx is clear. No oropharyngeal exudate or posterior oropharyngeal erythema. Eyes:      Extraocular Movements: Extraocular movements intact. Conjunctiva/sclera: Conjunctivae normal.      Pupils: Pupils are equal, round, and reactive to light. Neck:      Musculoskeletal: Normal range of motion. No neck rigidity or muscular tenderness. Trachea: No tracheal deviation. Cardiovascular:      Rate and Rhythm: Regular rhythm. Tachycardia present. Pulses: Normal pulses. Heart sounds: No murmur. No gallop. Pulmonary:      Effort: Pulmonary effort is normal. No respiratory distress. Breath sounds: Normal breath sounds. No wheezing, rhonchi or rales. Abdominal:      General: There is no distension. Palpations: Abdomen is soft. Tenderness: There is no abdominal tenderness. There is no guarding or rebound. Musculoskeletal: Normal range of motion. General: No deformity. Skin:     General: Skin is warm and dry. Capillary Refill: Capillary refill takes 2 to 3 seconds. Coloration: Skin is pale. Neurological:      General: No focal deficit present. Mental Status: He is alert. He is disoriented. Cranial Nerves: No cranial nerve deficit. Sensory: No sensory deficit. Motor: No weakness.          RESULTS     EKG: All EKG's are interpreted by the Emergency Department Physician who either signs or Co-signsthis chart in the absence of a cardiologist.    Patient's EKG shows a sinus rhythm with a ventricular rate of 98 bpm.  Patient's prolonged AK interval.  QTc interval is within acceptable range. Patient has normal axis. There are no significant ST elevations or depressions EKG is nondiagnostic for ACS. Paired EKG from 12/17/2016 there are new T wave inversions in the inferior leads. RADIOLOGY:   Non-plain filmimages such as CT, Ultrasound and MRI are read by the radiologist. Plain radiographic images are visualized and preliminarily interpreted by the emergency physician with the below findings:      Interpretation per the Radiologist below, if available at the time ofthis note:    XR CHEST STANDARD (2 VW)    (Results Pending)   CT Head WO Contrast    (Results Pending)         ED BEDSIDE ULTRASOUND:   Performed by ED Physician - none    LABS:  Labs Reviewed   CULTURE, BLOOD 1   CULTURE, BLOOD 2   CBC WITH AUTO DIFFERENTIAL   COMPREHENSIVE METABOLIC PANEL W/ REFLEX TO MG FOR LOW K   LIPASE   TROPONIN   LACTIC ACID, PLASMA   URINE RT REFLEX TO CULTURE   PROCALCITONIN       All other labs were within normal range or not returned as of this dictation. EMERGENCY DEPARTMENT COURSE and DIFFERENTIAL DIAGNOSIS/MDM:   Vitals:    Vitals:    03/03/20 0458   BP: 114/63   Pulse: 98   Resp: 25   Temp: 100.2 °F (37.9 °C)   TempSrc: Oral   SpO2: 94%   Weight: 181 lb 7 oz (82.3 kg)   Height: 5' 4\" (1.626 m)       Patient was given thefollowing medications:  Medications   0.9 % sodium chloride bolus (has no administration in time range)   acetaminophen (TYLENOL) tablet 650 mg (650 mg Oral Given 3/3/20 0525)       ED COURSE & MEDICAL DECISION MAKING    Pertinent Labs & Imaging studies reviewed. (See chart for details)   -  Patient seen and evaluated in the emergency department. -  Triage and nursing notes reviewed and incorporated. -  Old chart records reviewed and incorporated.   -  Differential diagnosis includes: Differential Diagnosis:    Hypoxemia/ischemic encephalopathy, hepatic encephalopathy   Seizure or postictal state   Alterations of glucose such as hypoglycemia and hyperglycemia

## 2020-03-03 NOTE — CONSULTS
Infectious Diseases Inpatient Consult Note      Reason for Consult:  High fevers, change in mentation and sepsis currently on chemo for CLL     Requesting Physician:  Jarrell Rubio     Primary Care Physician:  Coco Mora MD    History Obtained From:  Medical records and wife at bed side      CHIEF COMPLAINT:     Chief Complaint   Patient presents with    Altered Mental Status     Pt came in via squad with altered mental status after being found \"wandering around house\". Pt denies any pain at this time. HISTORY OF PRESENT ILLNESS:  76 y.o. man with CLL Relapse and DM, Hypogammaglobulinemia, congenital blindness admitted from home with acute fevers and chills, confusion and not doing well. He has received chemo recently per his wife and was also on a course of Augmentin recently now admitted with high fevers, confusion, not following commands, he has chest port in place also has a chest skin lesion awaiting dermatology eval and Lactic acid elevation on admit with Pro jim elevation and given on going sepsis we are consulted for recommendations.          Past Medical History:    Past Medical History:   Diagnosis Date    Anxiety     Blind     Blood disorder     Cancer ([facility] [address]     chronic lymphoid leukemia    Diabetes mellitus ([facility] [address] [facility]     Encounter for long-term (current) use of high-risk medication 2/8/2016    Glaucoma     Hyperlipidemia     Hypertension        Past Surgical History:    Past Surgical History:   Procedure Laterality Date    APPENDECTOMY      FINE NEEDLE ASPIRATION  2006    HAND SURGERY Left 10/16/14    excision left thumb lesion with skin graft/trigger finger release left thumb and 5th finger    LYMPH NODE DISSECTION  2008    PRE-MALIGNANT / BENIGN SKIN LESION EXCISION      : excision 3.4 cm right shoulder lesion and 1.7 cm incisional biopsy left thumb    TUNNELED VENOUS PORT PLACEMENT Left 2006    TUNNELED VENOUS PORT PLACEMENT  2006       Current Medications:    No mucosa and turbinates normal without polyps  Neck: supple and non-tender without mass, no thyromegaly  no cervical lymphadenopathy  Pulmonary/Chest: clear to auscultation bilaterally- no wheezes, rales or rhonchi, normal air movement, no respiratory distress  Cardiovascular: rhythm, normal S1 and S2, no murmurs, rubs, clicks, or gallops, no carotid bruits  Abdomen: soft, non-tender, non-distended, normal bowel sounds, no masses or organomegaly  Extremities: no cyanosis, clubbing or edema  Musculoskeletal: normal range of motion, no joint swelling, deformity or tenderness  Neurologic: reflexes normal and symmetric, no cranial nerve deficit  Lines:  Chest port in place    Sternal area shallow ulcer with scab+   Bi lateral lower leg with venous stasis changes and ecchymosis          DATA:    Lab Results   Component Value Date    WBC 7.4 03/03/2020    HGB 12.5 (L) 03/03/2020    HCT 36.5 (L) 03/03/2020    MCV 89.9 03/03/2020    PLT 72 (L) 03/03/2020     Lab Results   Component Value Date    CREATININE 1.5 (H) 03/03/2020    BUN 28 (H) 03/03/2020     03/03/2020    K 4.3 03/03/2020     03/03/2020    CO2 18 (L) 03/03/2020       Hepatic Function Panel:   Lab Results   Component Value Date    ALKPHOS 65 03/03/2020    ALT 19 03/03/2020    AST 57 03/03/2020    PROT 5.9 03/03/2020    PROT 6.4 07/20/2017    BILITOT 0.5 03/03/2020    LABALBU 3.4 03/03/2020     UA:  Lab Results   Component Value Date    COLORU YELLOW 03/03/2020    CLARITYU Clear 03/03/2020    GLUCOSEU >=1000 03/03/2020    BILIRUBINUR Negative 03/03/2020    KETUA Negative 03/03/2020    SPECGRAV 1.026 03/03/2020    BLOODU TRACE 03/03/2020    PHUR 5.0 03/03/2020    PROTEINU 30 03/03/2020    UROBILINOGEN 0.2 03/03/2020    NITRU Negative 03/03/2020    LEUKOCYTESUR Negative 03/03/2020    LABMICR YES 03/03/2020    URINETYPE Cleancatch 03/03/2020      Urine Microscopic:   Lab Results   Component Value Date    HYALCAST 7 03/03/2020    WBCUA 1 03/03/2020    RBCUA lymphocytic leukemia of B-cell type in relapse (HCC)    High risk medication use    Fever of unknown origin    DMII (diabetes mellitus, type 2) ([facility] [address] [facility]    Hypertension    Hyperlipidemia    Pneumonia due to organism    CAP (community acquired pneumonia)    Fever and chills    Status post chemotherapy    CLL (chronic lymphocytic leukemia) (HCC)       Sepsis  High fevers  Immune suppressed  Lactic acidosis  DEEPTHI  High Procal   CLL relapsed  Low IgG on replacement   Chest port in place  H/o skin cancer  Congenital blindness  Change in Mentation +       He remains very ill from on going sepsis and fevers, and he is immune suppressed risk for Atypical infections high and will check CSF given fevers and change in mentation orders in place    Labs, Microbiology, Radiology and pertinent results from current hospitalization and care every where were reviewed by me as a part of the consultation. PLAN :  1. Cont IV Vancomycin x by levels  2. Cont IV Zosyn pending final cx  3. LP by IR arranged but pt was uncooperative and this is on hold and will check tomorrow if he would allow  4. In the mean time I have added IV Acyclovir pending test results   5. Receiving IVIG today   6. Resp virus PCR to check for Flu given the timing and high fevers and immune suppressed state  7. Send urine for Legionella, Pneumococcal antigen  8. Blood cx in process  9. Trend Pro jim     Discussed with patient/Family and Nursing d/w ED RN    Risk of Complications/Morbidity: High      · Illness(es)/ Infection present that pose threat to bodily function. · There is potential for severe exacerbation of infection/side effects of treatment. · Therapy requires intensive monitoring for antimicrobial agent toxicity. Thanks for allowing me to participate in your patient's care please call me with any questions or concerns.     Dr. She Ornelas MD  [address] [place] [address] Physician  Phone: [phone]   Fax : [phone]

## 2020-03-03 NOTE — ED NOTES
Bed: B-34  Expected date:   Expected time:   Means of arrival:   Comments:  [id number] [address] altered mental status     Ayesha Duong RN  03/03/20 9513

## 2020-03-03 NOTE — CONSULTS
Clinical Pharmacy Note  Vancomycin Consult    Clare Lopez is a 76 y.o. male ordered Vancomycin; consult received from Dr. Sampson Alvarado to manage therapy. Also receiving Zosyn. Patient Active Problem List   Diagnosis    [facility] lymphocytic leukemia ([facility]    Hypogammaglobulinemia (HCC)    CLL (chronic lymphoid leukemia) in relapse ([facility]    Congenital blindness    Encounter for long-term (current) use of high-risk medication    Chronic lymphocytic leukemia of B-cell type in relapse ([facility]    High risk medication use    Fever of unknown origin    DMII (diabetes mellitus, type 2) ([facility]    Hypertension    Hyperlipidemia    Pneumonia due to organism    CAP (community acquired pneumonia)    Fever and chills    Status post chemotherapy    CLL [facility]ch[facility] lymphocytic leukemia) ([facility])       Allergies:  Patient has no known allergies. Temp max:  Temp (24hrs), Av.2 °F (37.9 °C), Min:100.2 °F (37.9 °C), Max:100.2 °F (37.9 °C)      Recent Labs     20  0537   WBC 7.4       Recent Labs     20  0537   BUN 28*   CREATININE 1.5*       No intake or output data in the 24 hours ending 20 0851    Culture Results:  pending    Ht Readings from Last 1 Encounters:   20 5' 4\" (1.626 m)        Wt Readings from Last 1 Encounters:   20 181 lb 7 oz (82.3 kg)         Estimated Creatinine Clearance: 42 mL/min (A) (based on SCr of 1.5 mg/dL (H)). Assessment/Plan:  Will initiate vancomycin 1000 mg IV once. Will await further orders from the attending. Thank you for the consult.    Nori Perea [facility],3/3/2020,8:52 AM

## 2020-03-04 NOTE — PROGRESS NOTES
Physical Therapy     Initial Assessment / Treatment Note    Room Number: D7C-5580/1152-73  NAME: Efrem Arana  : Medical Record Number: [id number]  MRN: [id number]    ASSESSMENT: Giovanni Paz is a 76 y.o. M admit 3/3/20 with altered mental status and hypoglycemia -- pt's spouse found him wandering around the house. Pt with CLL (currently relapsed and with multiple lines) -- at risk for atypical infections. Prior to admit pt was living at home with his spouse and ambulating independently with a cane for the blind. Today he ambulated steadily with CGA (due to not having his cane for the blind) up to 150' steadily. Will follow. Discharge Recommendations: S Level 1, Patient would benefit from continued therapy after discharge, Continue to assess pending progress, 24 hour supervision or assist   HOME HEALTH CARE: LEVEL 1 STANDARD   -Initial home health evaluation to occur within 24-48 hours, in patient home    -Home health agency to establish plan of care for patient over 60 day period    -Medication Reconciliation    -PCP Visit scheduled within seven days of discharge    -PT/OT to evaluate with goal of regaining prior level of functioning    -OT to evaluate if patient has [address] needs for personal care   Equipment Needs: Equipment Needed: No    Date of Service: 20       Patient Diagnosis(es): The primary encounter diagnosis was Altered mental status, unspecified altered mental status type. Diagnoses of Lactic acid acidosis, DEEPTHI (acute kidney injury) ([address] and Febrile illness were also pertinent to this visit. Past Medical History:  has a past medical history of Anxiety, Blind, Blood disorder, Cancer ([address] Diabetes mellitus ([address] Encounter for long-term (current) use of high-risk medication, Glaucoma, Hyperlipidemia, and Hypertension. Past Surgical History:  has a past surgical history that includes pre-malignant / benign skin lesion excision; Tunneled venous port placement (Left, );  Hand seat  Home Equipment: (white cane)  ADL Assistance: Independent  Homemaking Assistance: (spouse assists with IADLs d/t vision impairment)  Ambulation Assistance: Independent(with cane)  Transfer Assistance: Independent  Active : No  Additional Comments: Pt poor historian- need to verify accuracy of social history. OBJECTIVE:  ROM  RLE PROM: WFL     LLE PROM: [facility]       STRENGTH  Strength RLE: WFL  Comment: not formally examined but no significant weakness observed in gait or transfers     Strength LLE: WFL  Comment: not formally examined but no significant weakness observed in gait or transfers       Bed mobility  Supine to Sit: Stand by assistance    Transfers  Sit to Stand: Stand by assistance  Stand to sit: Stand by assistance    Ambulation  Ambulation  Ambulation?: Yes  Ambulation 1  Device: No Device  Assistance: Contact guard assistance(CGA only due to patient not having his cane for the blind)  Quality of Gait: toe out and hind-foot valgus posture, short steps, reduced foot clearance, slow speed, steady in turns and straight path, R hand in mid-guard position to help with apprehending objects (compensatory for sightlessness)  Distance: 150'    Stairs/Curb  Stairs/Curb  Stairs?: No    Balance  Balance  Sitting - Static: Good  Standing - Static: Good, -        Treatment included transfer training, gait training, and patient education. This note also serves as a D/C Summary in the event that this patient is discharged prior to the next therapy session. Please refer to last PT note for goal status, discharge recommendations and functional status. ASSESSMENT:   Assessment: Cristiano Bourgeois is a 76 y.o. M admit 3/3/20 with altered mental status and hypoglycemia -- pt's spouse found him wandering around the house. Pt with CLL (currently relapsed and with multiple lines) -- at risk for atypical infections.  Prior to admit pt was living at home with his spouse and ambulating independently with a cane

## 2020-03-04 NOTE — PROGRESS NOTES
[facility]  Progress Note      SUBJECTIVE:   PT doing ok -he is much more alert -  He does not have his hearing aids in but he is acting much better  HE has no complaints  Fever was high 103-104    ROS: no sweats, no nausea, vomiting, diarrhea  shortness of breath chest pain or other pain  OBJECTIVE      Medications    Current Facility-Administered Medications: vancomycin (VANCOCIN) 1250 mg in dextrose 5 % 250 mL IVPB, 1,250 mg, Intravenous, Q16H  Tbo-Filgrastim (GRANIX) injection 300 mcg, 300 mcg, Subcutaneous, Once  glucose (GLUTOSE) 40 % oral gel 15 g, 15 g, Oral, PRN  dextrose 50 % IV solution, 12.5 g, Intravenous, PRN  glucagon (rDNA) injection 1 mg, 1 mg, Intramuscular, PRN  dextrose 5 % solution, 100 mL/hr, Intravenous, PRN  sodium chloride flush 0.9 % injection 10 mL, 10 mL, Intravenous, 2 times per day  sodium chloride flush 0.9 % injection 10 mL, 10 mL, Intravenous, PRN  acetaminophen (TYLENOL) tablet 650 mg, 650 mg, Oral, Q6H PRN **OR** acetaminophen (TYLENOL) suppository 650 mg, 650 mg, Rectal, Q6H PRN  enoxaparin (LOVENOX) injection 40 mg, 40 mg, Subcutaneous, Nightly  0.9 % sodium chloride infusion, , Intravenous, Continuous  polyethylene glycol (GLYCOLAX) packet 17 g, 17 g, Oral, Daily PRN  sennosides-docusate sodium (SENOKOT-S) 8.6-50 MG tablet 2 tablet, 2 tablet, Oral, Daily PRN  ondansetron (ZOFRAN) injection 4 mg, 4 mg, Intravenous, Q6H PRN  acetaminophen (TYLENOL) tablet 650 mg, 650 mg, Oral, Once  aspirin chewable tablet 81 mg, 81 mg, Oral, Daily  cetirizine (ZYRTEC) tablet 10 mg, 10 mg, Oral, Daily  rosuvastatin (CRESTOR) tablet 40 mg, 40 mg, Oral, QPM  insulin lispro (HUMALOG) injection vial 0-6 Units, 0-6 Units, Subcutaneous, TID WC  insulin lispro (HUMALOG) injection vial 0-3 Units, 0-3 Units, Subcutaneous, Nightly  famotidine (PEPCID) injection 20 mg, 20 mg, Intravenous, Daily  vancomycin (VANCOCIN) intermittent dosing (placeholder), , Other, RX 03/04/20  0445   WBC 7.4 1.7*   HGB 12.5* 11.2*   HCT 36.5* 31.9*   PLT 72* 38*   MCV 89.9 88.2        Recent Labs     03/03/20  0537 03/04/20  0445    144   K 4.3 3.5    111*   CO2 18* 21   BUN 28* 20   CREATININE 1.5* 0.9     Recent Labs     03/03/20  0537 03/04/20  0445   AST 57* 57*   ALT 19 21   BILITOT 0.5 0.7   ALKPHOS 65 46       Magnesium:  No results found for: MG    Imaging Xr Chest Standard (2 Vw)    Result Date: 3/3/2020  EXAMINATION: TWO XRAY VIEWS OF THE CHEST 3/3/2020 5:06 am COMPARISON: Chest radiograph 12/09/2019; CT chest 01/23/2020 HISTORY: ORDERING SYSTEM PROVIDED HISTORY: sob TECHNOLOGIST PROVIDED HISTORY: Reason for exam:->sob Reason for Exam: sob FINDINGS: Lungs are mildly hypoexpanded, contributing to bronchovascular crowding and basilar atelectasis. No pneumothorax or pleural effusion. Normal heart size and mediastinal contours. Left chest port loops over the left IJ central venous catheter via for terminating over the SVC. Left axillary surgical clips. Shallow inspiratory effort with basilar atelectasis. Ct Head Wo Contrast    Result Date: 3/3/2020  EXAMINATION: CT OF THE HEAD WITHOUT CONTRAST  3/3/2020 5:08 am TECHNIQUE: CT of the head was performed without the administration of intravenous contrast. Dose modulation, iterative reconstruction, and/or weight based adjustment of the mA/kV was utilized to reduce the radiation dose to as low as reasonably achievable. COMPARISON: CT head 12/24/2018 HISTORY: ORDERING SYSTEM PROVIDED HISTORY: ams TECHNOLOGIST PROVIDED HISTORY: Reason for exam:->ams Has a \"code stroke\" or \"stroke alert\" been called? ->No Reason for Exam: ams FINDINGS: BRAIN/VENTRICLES: There is no acute intracranial hemorrhage, mass effect or midline shift. No abnormal extra-axial fluid collection. The gray-white differentiation is maintained without evidence of an acute infarct. There is no evidence of hydrocephalus.  No substantial change in subcortical

## 2020-03-04 NOTE — PROGRESS NOTES
Infectious Disease Follow up Notes  Admit Date: 3/3/2020  Hospital Day: 2    Antibiotics :   IV Vancomycin  IV Zosyn   IV Acyclovir     CHIEF COMPLAINT:     Fevers  Change in mentation  Sepsis  Lactic acidosis  CLL    Subjective interval History :  76 y.o.  man with CLL Relapse and DM, Hypogammaglobulinemia, congenital blindness admitted from home with acute fevers and chills, confusion and not doing well.  He has received chemo recently per his wife and was also on a course of Augmentin recently now admitted with high fevers, confusion, not following commands, he has chest port in place also has a chest skin lesion awaiting dermatology eval and Lactic acid elevation on admit with Pro jim elevation and given on going sepsis we are consulted for recommendations.      Sitting up in the chair and fevers trend down but platelets low and WBC low, Blood cx in process chest port working well wife at bed side tolerating IV abx ok       Past Medical History:    Past Medical History:   Diagnosis Date    Anxiety     Blind     Blood disorder     Cancer ([facility]     chronic lymphoid leukemia    Diabetes mellitus ([facility] [address]     Encounter for long-term (current) use of high-risk medication 2/8/2016    Glaucoma     Hyperlipidemia     Hypertension        Past Surgical History:    Past Surgical History:   Procedure Laterality Date    APPENDECTOMY      FINE NEEDLE ASPIRATION  2006    HAND SURGERY Left 10/16/14    excision left thumb lesion with skin graft/trigger finger release left thumb and 5th finger    LYMPH NODE DISSECTION  2008    PRE-MALIGNANT / BENIGN SKIN LESION EXCISION      : excision 3.4 cm right shoulder lesion and 1.7 cm incisional biopsy left thumb    TUNNELED VENOUS PORT PLACEMENT Left 2006    TUNNELED VENOUS PORT PLACEMENT  2006       Current Medications:    Outpatient Medications Marked as Taking for the 3/3/20 encounter [facility] Encounter)   Medication Sig Dispense Refill    chlorambucil (LEUKERAN) 2 MG chemo tablet Take 2 mg by mouth Pt takes every 15 days per Dr Yoan Polanco. #38 tabs dispensed monthly      glimepiride (AMARYL) 1 MG tablet Take 1 mg by mouth Daily with supper       Insulin Degludec (TRESIBA FLEXTOUCH) 100 UNIT/ML SOPN Inject 22-30 Units into the skin daily       venlafaxine (EFFEXOR) 37.5 MG tablet Take 37.5 mg by mouth daily       Liraglutide (VICTOZA) 18 MG/3ML SOPN SC injection Inject 0.6 mg into the skin daily      rosuvastatin (CRESTOR) 40 MG tablet Take 40 mg by mouth every evening      lisinopril (PRINIVIL;ZESTRIL) 20 MG tablet Take 20 mg by mouth daily       desloratadine (CLARINEX) 5 MG tablet Take 5 mg by mouth daily.  fenofibrate (TRICOR) 145 MG tablet Take 145 mg by mouth daily.  aspirin 81 MG tablet Take 81 mg by mouth daily. Allergies:  Patient has no known allergies. Immunizations :   Immunization History   Administered Date(s) Administered    Influenza Vaccine, unspecified formulation 10/17/2016    Tdap (Boostrix, Adacel) 12/24/2018       Social History:    Social History     Tobacco Use    Smoking status: Never Smoker    Smokeless tobacco: Never Used   Substance Use Topics    Alcohol use: Yes     Comment: rare use    Drug use: No     Social History     Tobacco Use   Smoking Status Never Smoker   Smokeless Tobacco Never Used      Family History   Problem Relation Age of Onset    Breast Cancer Sister     Coronary Art Dis Mother     Diabetes Mother     Elevated Lipids Mother     Hypertension Mother     Obesity Mother     Obesity Brother          REVIEW OF SYSTEMS:    No fever / chills / sweats. No weight loss. No visual change, eye pain, eye discharge. No oral lesion, sore throat, dysphagia. Denies cough / sputum/Sob   Denies chest pain, palpitations/ dizziness  Denies nausea/ vomiting/abdominal pain/diarrhea. Denies dysuria or change in urinary function.   Denies with basilar atelectasis.                All the pertinent images and reports for the current Hospitalization were reviewed by me     Scheduled Meds:   vancomycin  1,250 mg Intravenous Q16H    tbo-filgrastim  300 mcg Subcutaneous Once    sodium chloride flush  10 mL Intravenous 2 times per day    acetaminophen  650 mg Oral Once    aspirin  81 mg Oral Daily    cetirizine  10 mg Oral Daily    rosuvastatin  40 mg Oral QPM    insulin lispro  0-6 Units Subcutaneous TID WC    insulin lispro  0-3 Units Subcutaneous Nightly    famotidine (PEPCID) injection  20 mg Intravenous Daily    vancomycin (VANCOCIN) intermittent dosing (placeholder)   Other RX Placeholder    acyclovir  10 mg/kg (Adjusted) Intravenous Q12H    piperacillin-tazobactam  3.375 g Intravenous Q8H       Continuous Infusions:   dextrose      sodium chloride 75 mL/hr at 03/04/20 0521       PRN Meds:  glucose, dextrose, glucagon (rDNA), dextrose, sodium chloride flush, acetaminophen **OR** acetaminophen, polyethylene glycol, sennosides-docusate sodium, ondansetron, acetaminophen      Assessment:     Patient Active Problem List   Diagnosis    [facility] [address]    Hypogammaglobulinemia (HCC)    CLL (chronic lymphoid leukemia) in relapse ([facility] [address]    Congenital blindness    Encounter for long-term (current) use of high-risk medication    Chronic lymphocytic leukemia of B-cell type in relapse ([facility] [address]    High risk medication use    Fever of unknown origin    DMII (diabetes mellitus, type 2) ([facility] [address]    Hypertension    Hyperlipidemia    Pneumonia due to organism    CAP (community acquired pneumonia)    Fever and chills    Status post chemotherapy    CLL (chronic lymphocytic leukemia) (HCC)       Sepsis  High fevers  Immune suppressed  Lactic acidosis  DEEPTHI  High Procal   CLL relapsed  Low IgG on replacement   Chest port in place  H/o skin cancer  Congenital blindness  Change in Mentation +         He remains very ill from on going sepsis and fevers, and he is immune suppressed risk for Atypical infections high     Mental status slowly improving and fevers trending down WBC low likely post chemo and we can hold off on LP given the improvement in mentation and this seems to be from high fevers     Will follow cx to guide IV abx therapy watch WBC for any severe Neutropenia      Labs, Microbiology, Radiology and all the pertinent results from current hospitalization and  care every where were reviewed  by me as a part of the evaluation   Plan:   1. Cont IV Vancomycin x 1 gm Q 16 HRS   2. Cont IV Zosyn pending final cx  3. Can hold off on LP given improvement in mental status   4. Cont IV Acyclovir pending  Clinical improvement  5. Received  IVIG on admit   6. Resp virus PCR  In process - rapid flu antigen  -ve  7. Send urine for Legionella, Pneumococcal antigen  8. Blood cx in process  9. Trend Pro jim check in AM       Discussed with patient/Family and Nursing     Risk of Complications/Morbidity: High      · Illness(es)/ Infection present that pose threat to bodily function. · There is potential for severe exacerbation of infection/side effects of treatment. · Therapy requires intensive monitoring for antimicrobial agent toxicity. Discussed with patient/Family and Nursing staff     Thanks for allowing me to participate in your patient's care and please call me with any questions or concerns.     Marlene Powers MD  Infectious Disease  [facility]) Physician  Phone: [phone]   Fax : [phone]

## 2020-03-04 NOTE — PLAN OF CARE
Problem: Falls - Risk of:  Goal: Will remain free from falls  Description  Will remain free from falls  3/3/2020 2217 by Leslie Paul RN  Outcome: Ongoing  3/3/2020 2005 by Mee Starkey RN  Outcome: Ongoing     Problem: Falls - Risk of:  Goal: Absence of physical injury  Description  Absence of physical injury  3/3/2020 2217 by Leslie Paul RN  Outcome: Ongoing  3/3/2020 2005 by Mee Starkey RN  Outcome: Ongoing    Patient uses call light appropriately to express needs. Bed to lowest position with door open and call light in reach. All fall precautions implemented at this time. Bed alarm on for safety. Siderails up x2. Non skid footwear in place. Tele camera in place. Seen at intervals to ensure safety. All needs attended.       Problem: Confusion - Acute:  Goal: Absence of continued neurological deterioration signs and symptoms  Description  Absence of continued neurological deterioration signs and symptoms  3/3/2020 2217 by Leslie Paul RN  Outcome: Ongoing  3/3/2020 2005 by Mee Starkey RN  Outcome: Ongoing     Problem: Confusion - Acute:  Goal: Mental status will be restored to baseline  Description  Mental status will be restored to baseline  3/3/2020 2217 by Leslie Paul RN  Outcome: Ongoing  3/3/2020 2005 by Mee Starkey RN  Outcome: Ongoing

## 2020-03-04 NOTE — PLAN OF CARE
Problem: Falls - Risk of:  Goal: Will remain free from falls  Description  Will remain free from falls  Outcome: Ongoing  Pt will remain free from falls by using appropriate safety awareness/judgement and alert the RN/PCA when help is needed. Goal: Absence of physical injury  Description  Absence of physical injury  Outcome: Ongoing  Pt will be free from injury by using appropriate safety awareness and judgement. Pt will call for help when needed. RN will continue to do safety checks and ask if pt needs help to the bathroom in advance to prevent falls.            Problem: Confusion - Acute:  Goal: Absence of continued neurological deterioration signs and symptoms  Description  Absence of continued neurological deterioration signs and symptoms  Outcome: Ongoing  Goal: Mental status will be restored to baseline  Description  Mental status will be restored to baseline  Outcome: Ongoing     Problem: Discharge Planning:  Goal: Ability to perform activities of daily living will improve  Description  Ability to perform activities of daily living will improve  Outcome: Ongoing  Goal: Participates in care planning  Description  Participates in care planning  Outcome: Ongoing     Problem: Injury - Risk of, Physical Injury:  Goal: Will remain free from falls  Description  Will remain free from falls  Outcome: Ongoing  Goal: Absence of physical injury  Description  Absence of physical injury  Outcome: Ongoing     Problem: Mood - Altered:  Goal: Mood stable  Description  Mood stable  Outcome: Ongoing  Goal: Absence of abusive behavior  Description  Absence of abusive behavior  Outcome: Ongoing  Goal: Verbalizations of feeling emotionally comfortable while being cared for will increase  Description  Verbalizations of feeling emotionally comfortable while being cared for will increase  Outcome: Ongoing     Problem: Psychomotor Activity - Altered:  Goal: Absence of psychomotor disturbance signs and symptoms  Description  Absence of psychomotor disturbance signs and symptoms  Outcome: Ongoing     Problem: Sensory Perception - Impaired:  Goal: Demonstrations of improved sensory functioning will increase  Description  Demonstrations of improved sensory functioning will increase  Outcome: Ongoing  Goal: Decrease in sensory misperception frequency  Description  Decrease in sensory misperception frequency  Outcome: Ongoing  Goal: Able to refrain from responding to false sensory perceptions  Description  Able to refrain from responding to false sensory perceptions  Outcome: Ongoing  Goal: Demonstrates accurate environmental perceptions  Description  Demonstrates accurate environmental perceptions  Outcome: Ongoing  Goal: Able to distinguish between reality-based and nonreality-based thinking  Description  Able to distinguish between reality-based and nonreality-based thinking  Outcome: Ongoing  Goal: Able to interrupt nonreality-based thinking  Description  Able to interrupt nonreality-based thinking  Outcome: Ongoing     Problem: Sleep Pattern Disturbance:  Goal: Appears well-rested  Description  Appears well-rested  Outcome: Ongoing

## 2020-03-04 NOTE — PROGRESS NOTES
Pharmacy Medication Reconciliation Note     List of medications patient is currently taking is complete. Source of information:   1. Conversation with pt. Not able to give me much info regarding his meds at this time. Was agreeable with me calling his Pharmacy and reviewing PCP notes in Care Everywhere   2. Dr Haylie Eckert was also in room and could confirm that her gets Leukeran every 15 days. She's not concerned with his receiving this med currently     No Known Allergies    Notes regarding home medications:   1. Amaryl dose was reduced by PCP on 9/13/19 to 1 mg with evening meal.   2. Removed Imbruvica, Pravachol, Glucotrol, Metformin  3. Victoza and Tresiba doses clarified using EMR  4. Will need to clarify with Dr Haylie Eckert if [place] Brighter was stopped with Leukeran started. Left on med rec for now.      Harvey Quinones, [facility]  3/4/2020  8:55 AM

## 2020-03-04 NOTE — PROGRESS NOTES
4 Eyes Skin Assessment     The patient is being assess for  Admission    I agree that 2 RN's have performed a thorough Head to Toe Skin Assessment on the patient. ALL assessment sites listed below have been assessed. Areas assessed by both nurses:   [x]   Head, Face, and Ears   [x]   Shoulders, Back, and Chest  [x]   Arms, Elbows, and Hands   [x]   Coccyx, Sacrum, and IschIum  [x]   Legs, Feet, and Heels        Does the Patient have Skin Breakdown?   No         Jimi Prevention initiated:  Yes   Wound Care Orders initiated:  NO      [facility] nurse consulted for Pressure Injury (Stage 3,4, Unstageable, DTI, NWPT, and Complex wounds), New and Established Ostomies:  No      Nurse 1 eSignature: Electronically signed by Maame Ramirez RN on 3/3/20 at 7:58 PM    **SHARE this note so that the co-signing nurse is able to place an eSignature**    Nurse 2 eSignature: {Esignature:[id number]}

## 2020-03-04 NOTE — PROGRESS NOTES
past surgical history that includes pre-malignant / benign skin lesion excision; Tunneled venous port placement (Left, 2006); Hand surgery (Left, 10/16/14); lymph node dissection (2008); fine needle aspiration (2006); Tunneled venous port placement (2006); and Appendectomy. Restrictions  Restrictions/Precautions  Restrictions/Precautions: Fall Risk  Position Activity Restriction  Other position/activity restrictions: neutropenic precautions. Pt is blind and [place]    Subjective   General  Chart Reviewed: Yes  Patient assessed for rehabilitation services?: Yes  Additional Pertinent Hx: Per ED notes: \"Mariano Jim is a 76 y.o. male who presents to the emergency department for evaluation of altered mental status and hypoglycemia. Patient has a history of CLL and recently was started on new chemotherapy last session was approximate 2 days ago. . Wife reports that the patient began having fevers the previous day. They spoke to the on-call oncologist who informed them that her fever could be normal after the chemotherapy, she states the patient was not complaining of any pain or infectious symptoms. This morning the patient was found wandering the house confused. The wife states that the patient has been confused when he had fevers in the past.  EMS was called and the patient was noted to be hypoglycemic with a blood glucose of 298.   \"   Family / Caregiver Present: No  Referring Practitioner: Goyo Benitez MD  Subjective  Subjective: Pt seen bedside and agreeable to therapy  General Comment  Comments: Per RN ok for therapy    Social/Functional History  Social/Functional History  Lives With: Spouse  Type of Home: (condo)  Home Layout: One level  Home Access: Stairs to enter with rails  Entrance Stairs - Number of Steps: 8 steps down into condo with zuleima handrail  Entrance Stairs - Rails: Both  Bathroom Shower/Tub: Walk-in shower  Bathroom Toilet: Standard(vanity next to toilet)  Bathroom Equipment: Built-in shower seat  Home Equipment: (white cane)  ADL Assistance: Independent  Homemaking Assistance: (spouse assists with IADLs d/t vision impairment)  Ambulation Assistance: Independent(with cane)  Transfer Assistance: Independent  Active : No  Additional Comments: Pt poor historian- need to verify accuracy of social history. Objective   Vision: Impaired  Vision Exceptions: Legally blind  Hearing: Exceptions to [facility]  Hearing Exceptions: Hard of hearing/hearing concerns    Orientation  Overall Orientation Status: Impaired  Orientation Level: Oriented to person;Disoriented to situation(could not ID birth year without multiple choice; could ID hospital- multiple choice to  [facility] [address])     Balance  Sitting Balance: Stand by assistance  Standing Balance: Contact guard assistance  Standing Balance  Time: prn for toileting and to wash hands at sink  Functional Mobility  Functional - Mobility Device: No device  Activity: To/from bathroom  Assist Level: Contact guard assistance  Functional Mobility Comments: CGA/handheld assist d/t vision impairments and difficulty navigating unfamiliar environment- cuing for directions to navigate area  Toilet Transfers  Toilet - Technique: Ambulating  Equipment Used: Standard toilet  Toilet Transfer: Contact guard assistance  Toilet Transfers Comments: with grab bar support     ADL  Grooming: Contact guard assistance(standing sinkside to wash hands)  Toileting: Contact guard assistance  Additional Comments: anticipate pt will require CGA for ADLs based on balance, cognition, and vision observed        Bed mobility  Supine to Sit: Stand by assistance(HOB elevated)  Transfers  Sit to stand: Contact guard assistance  Stand to sit: Contact guard assistance     Cognition  Overall Cognitive Status: Exceptions  Arousal/Alertness: Appropriate responses to stimuli  Following Commands: Follows one step commands consistently; Follows one step commands with repetition(could be related to [facility]  Memory: Decreased short term memory  Safety Judgement: Decreased awareness of need for assistance;Decreased awareness of need for safety  Problem Solving: Decreased awareness of errors  Insights: Decreased awareness of deficits  Initiation: Requires cues for some  Cognition Comment: has difficulty with word finding at time- related to confusion? Perception  Overall Perceptual Status: WFL     Sensation  Overall Sensation Status: WFL        LUE AROM (degrees)  LUE AROM : WFL  Left Hand AROM (degrees)  Left Hand AROM: WFL  RUE AROM (degrees)  RUE AROM : WFL  Right Hand AROM (degrees)  Right Hand AROM: WFL      Plan   Plan  Times per week: 3-5  Current Treatment Recommendations: Strengthening, Gait Training, Safety Education & Training, Stair training, Balance Training, Self-Care / ADL, Functional Mobility Training, Endurance Training    AM-PAC Score  AM-[facility] Inpatient Daily Activity Raw Score: 19 (03/04/20 0953)  AM-PAC Inpatient ADL T-Scale Score : 40.22 (03/04/20 0953)  ADL Inpatient CMS 0-100% Score: 42.8 (03/04/20 0953)  ADL Inpatient CMS G-Code Modifier : CK (03/04/20 0953)    Goals  Short term goals  Time Frame for Short term goals: Prior to DC: Short term goal 1: Pt will complete ADL transfers with SBA  Short term goal 2: Pt will complete toileting with SBA  Short term goal 3: Pt will complete LB Dressing with SBA  Short term goal 4: Pt will tolerate standing > 3 min for functional task with SBA  Long term goals  Time Frame for Long term goals : stgs=ltgs  Patient Goals   Patient goals : no goal stated       Therapy Time   Individual Concurrent Group Co-treatment   Time In 0740         Time Out 0820         Minutes 40         Timed Code Treatment Minutes: 40 Minutes     This note to serve as OT d/c summary if pt is d/c-ed prior to next therapy session.     Baldo Russell OTR/L

## 2020-03-04 NOTE — CONSULTS
Clinical Pharmacy Note  Vancomycin Consult    Fatuma Pierre is a 76 y.o. male ordered Vancomycin for sepsis; consult received from Dr. Babs Austin to manage therapy. Also receiving Zosyn. Patient Active Problem List   Diagnosis    [facility] ([facility]    Hypogammaglobulinemia (HCC)    CLL (chronic lymphoid leukemia) in relapse ([facility]    Congenital blindness    Encounter for long-term (current) use of high-risk medication    Chronic lymphocytic leukemia of B-cell type in relapse ([facility]    High risk medication use    Fever of unknown origin    DMII (diabetes mellitus, type 2) ([facility]    Hypertension    Hyperlipidemia    Pneumonia due to organism    CAP (community acquired pneumonia)    Fever and chills    Status post chemotherapy    CLL (chronic lymphocytic leukemia) ([facility])       Allergies:  Patient has no known allergies. Temp max:  Temp (24hrs), Av.9 °F (38.8 °C), Min:98.6 °F (37 °C), Max:104.6 °F (40.3 °C)      Recent Labs     20  0537 20  0445   WBC 7.4 1.7*       Recent Labs     20  0537 20  0445   BUN 28* 20   CREATININE 1.5* 0.9         Intake/Output Summary (Last 24 hours) at 3/4/2020 0711  Last data filed at 3/4/2020 2263  Gross per 24 hour   Intake 1501.25 ml   Output --   Net 1501.25 ml       Culture Results:  pending    Ht Readings from Last 1 Encounters:   20 5' 4\" (1.626 m)        Wt Readings from Last 1 Encounters:   20 176 lb 12.9 oz (80.2 kg)         Estimated Creatinine Clearance: 69 mL/min (based on SCr of 0.9 mg/dL). Assessment/Plan:  Trough this morning is 4.3   Serum Creatinine 1.5-->0. 8  Begin Vancomycin 1250 mg q16h  Regimen projects a trough level of 15-20 mg/L. Timing of trough level will be determined based on culture results, renal function, and clinical response. Thank you for the consult. Will continue to follow.   Byron Pratt RPH,3/4/2020,7:11 AM

## 2020-03-04 NOTE — CARE COORDINATION
INITIAL CASE MANAGEMENT ASSESSMENT    Reviewed chart, met with patient to assess possible discharge needs. Explained Case Management role/services. Living Situation: confirmed address    ADLs: independent, pt has been blind since birth. He is quite hard of hearing and often answered questions incorrectly as I think that he didn't hear      DME: he has a white cane    PT/OT Recs:   PT: Kevin Camejo scored a 20/24 on the AM-PAC short mobility form  OT: AM-[facility] Inpatient Daily Activity Raw Score: 19      Active Services: none that he is aware of but both he and  His wife are blind     Transportation: they tell me that they \"call a cab\", he tells me that groceries are delivered      Medications: he was unable to tell me where meds came from just that they were delivered. Has [facility] listed and I called there and was told that they only fill his oncology meds Leukerain (?) pharmacist thought that he used [facility] on [address] or [address] mail order/ uncertain if he could get meds filled here in retail without his insurance card    PCP: Obinna Hudson      HD/PD: n/a    PLAN/COMMENTS: per therapy notes will need HHC. Will also endeavor to meet w/ this wife    SW/CM provided contact information for patient or family to call with any questions. SW/CM will follow and assist as needed.   Electronically signed by Earnest Díaz RN on 3/4/2020 at 2:12 PM

## 2020-03-04 NOTE — PROGRESS NOTES
Patient's WBC is 1.7 as per laboratory. Dr. Yadira Martinez made aware. Will continue to monitor.  Electronically signed by Nena Patton RN on 3/4/2020 at 5:53 AM

## 2020-03-05 NOTE — PROGRESS NOTES
[facility]), Encounter for long-term (current) use of high-risk medication, Glaucoma, Hyperlipidemia, and Hypertension. PSgHx:   Past Surgical History:   Procedure Laterality Date    APPENDECTOMY      FINE NEEDLE ASPIRATION  2006    HAND SURGERY Left 10/16/14    excision left thumb lesion with skin graft/trigger finger release left thumb and 5th finger    LYMPH NODE DISSECTION  2008    PRE-MALIGNANT / BENIGN SKIN LESION EXCISION      : excision 3.4 cm right shoulder lesion and 1.7 cm incisional biopsy left thumb    TUNNELED VENOUS PORT PLACEMENT Left 2006    TUNNELED VENOUS PORT PLACEMENT  2006       Home Environment / Functional History  Social/Functional History  Lives With: Spouse  Type of Home: (condo)  Home Layout: One level  Home Access: Stairs to enter with rails  Entrance Stairs - Number of Steps: 8 steps down into condo with zuleima handrail  Entrance Stairs - Rails: Both  Bathroom Shower/Tub: Walk-in shower  Bathroom Toilet: Standard(vanity next to toilet)  Bathroom Equipment: Built-in shower seat  Home Equipment: (white cane)  ADL Assistance: Independent  Homemaking Assistance: (spouse assists with IADLs d/t vision impairment)  Ambulation Assistance: Independent(with cane)  Transfer Assistance: Independent  Active : No  Additional Comments: Pt poor historian- need to verify accuracy of social history. Restrictions  Restrictions/Precautions: Fall Risk  Other position/activity restrictions: neutropenic precautions. Pt is blind and [place]    SUBJECTIVE: Pt sitting up in recliner chair; willing to get up and ambulate.     OBJECTIVE:    Bed mobility  Supine to Sit: Unable to assess(up in chair at start/end of session)    Transfers  Sit to Stand: Stand by assistance  Stand to sit: Stand by assistance    Ambulation  Ambulation  Ambulation?: Yes  Ambulation 1  Device: No Device  Assistance: Contact guard assistance(CGA at patient's Right elbow only to help with directing patient as he did not have his cane for the blind)  Quality of Gait: toe out and hind-foot valgus posture, short steps, reduced foot clearance, slow speed, steady in turns and straight path, R hand in mid-guard position to help with apprehending objects (compensatory for sightlessness)  Distance: 400'  Comments: steady    Stairs  Stairs/Curb  Stairs?: No    Exercises   Exercises  Comments: SEATED: manually resisted LAQ x 10 reps zuleima. Neuro/balance  Balance  Sitting - Static: Good  Standing - Static: Good, -      Safety Devices: Type of devices: All fall risk precautions in place, Call light within reach, Chair alarm in place, Nurse notified, Patient at risk for falls, Left in chair, Telesitter in use      ASSESSMENT:   Lele Martin is a 76 y.o. M admit 3/3/20 with altered mental status and hypoglycemia -- pt's spouse found him wandering around the house. Pt with CLL (currently relapsed and with multiple lines) -- at risk for atypical infections. Prior to admit pt was living at home with his spouse and ambulating independently with a cane for the blind. Today he ambulated steadily with CGA (due to not having his cane for the blind) up to 400' steadily. Recommend that paytient return home with PRN assist and level 1 home PT. Will follow. Nicole Nash scored a 21/24 on the AM-PAC short mobility form. Initial research suggests that an AM-PAC score of 18 or greater may be associated with a discharge to patient's home setting. However in this initial research, cut off scores do not perfectly predict discharge location: 25% of patients with a score of 18 or greater actually went to a rehab facility and 20% of people with a score less than 18 actually went home. Based on my clinical judgement I recommend that the patient have level 1 home Physical Therapy at d/c to increase the patients independence.     Goals:  Time Frame for Short term goals: upon d/c - all goals ongoing 03/05/20 except as noted below  Short term goal 1: sup<>sit Ind   Short term goal

## 2020-03-05 NOTE — CARE COORDINATION
Met w/ pt, wife and service dog  Wife tells me that they have much community support, she plays in a symphony, they belong to a [employer].  For transport they use friends, Access or the occasional cab  meds are obtained locally at [facility] [facility] [address] and friends pick them up for them, or through [facility] for cancer meds  She has grocery service deliver and she prepares meals   She denied additional dc needs   Electronically signed by Bay Curiel RN on 3/5/2020 at 2:37 PM

## 2020-03-05 NOTE — PROGRESS NOTES
assistance  UE Dressing: (hospital gown )  LE Dressing: Setup;Verbal cueing; Moderate assistance(doff depends with SBA, doff socks with CGA as doffs right sock in standing, patient requires assist to don depends over B feet and then able to pull up over hips with SBA, dependent to don socks  )  Additional Comments: patient requires increased VC as out of home environment and patient is blind, noted to have dried blood behind left ear, neck swollen on B sides with left side more left right     Balance  Sitting Balance: Supervision  Standing Balance: (SBA/CGA depending on task )    Transfers  Sit to stand: Stand by assistance  Stand to sit: Stand by assistance    Functional Mobility  Functional - Mobility Device: No device  Activity: To/from bathroom  Assist Level: Contact guard assistance  Functional Mobility Comments: bed-chair at [facility]-recliner with CGA, cues for environment and navigation as walking stick not present     Bed mobility  Supine to Sit: Stand by assistance(HOB up, use of HR )    Cognition  Overall Cognitive Status: Exceptions  Cognition Comment: has difficulty with word finding at time, increased time required to express thoughts     Plan  Times per week: 3-5  Times per day: Daily  Current Treatment Recommendations: Strengthening, Gait Training, Safety Education & Training, Stair training, Balance Training, Self-Care / ADL, Functional Mobility Training, Endurance Training    Goals  Short term goals  Time Frame for Short term goals: Prior to DC: status as of 3/5/20: goals ongoing   Short term goal 1: Pt will complete ADL transfers with SBA  Short term goal 2: Pt will complete toileting with SBA  Short term goal 3: Pt will complete LB Dressing with SBA  Short term goal 4: Pt will tolerate standing > 3 min for functional task with SBA  Long term goals  Time Frame for Long term goals : stgs=ltgs  Patient Goals   Patient goals : no goal stated       Therapy Time   Individual Concurrent Group Co-treatment Time In [address]         Minutes 65         Timed Code Treatment Minutes: [address] [facility], [address] [place] [address] 140

## 2020-03-05 NOTE — PROGRESS NOTES
encounter [facility] Encounter)   Medication Sig Dispense Refill    chlorambucil (LEUKERAN) 2 MG chemo tablet Take 2 mg by mouth Pt takes every 15 days per Dr Jessica Fierro. #38 tabs dispensed monthly      glimepiride (AMARYL) 1 MG tablet Take 1 mg by mouth Daily with supper       Insulin Degludec (TRESIBA FLEXTOUCH) 100 UNIT/ML SOPN Inject 22-30 Units into the skin daily       venlafaxine (EFFEXOR) 37.5 MG tablet Take 37.5 mg by mouth daily       Liraglutide (VICTOZA) 18 MG/3ML SOPN SC injection Inject 0.6 mg into the skin daily      rosuvastatin (CRESTOR) 40 MG tablet Take 40 mg by mouth every evening      lisinopril (PRINIVIL;ZESTRIL) 20 MG tablet Take 20 mg by mouth daily       desloratadine (CLARINEX) 5 MG tablet Take 5 mg by mouth daily.  fenofibrate (TRICOR) 145 MG tablet Take 145 mg by mouth daily.  aspirin 81 MG tablet Take 81 mg by mouth daily. Allergies:  Patient has no known allergies. Immunizations :   Immunization History   Administered Date(s) Administered    Influenza Vaccine, unspecified formulation 10/17/2016    Tdap (Boostrix, Adacel) 12/24/2018       Social History:    Social History     Tobacco Use    Smoking status: Never Smoker    Smokeless tobacco: Never Used   Substance Use Topics    Alcohol use: Yes     Comment: rare use    Drug use: No     Social History     Tobacco Use   Smoking Status Never Smoker   Smokeless Tobacco Never Used      Family History   Problem Relation Age of Onset    Breast Cancer Sister     Coronary Art Dis Mother     Diabetes Mother     Elevated Lipids Mother     Hypertension Mother     Obesity Mother     Obesity Brother          REVIEW OF SYSTEMS:    No fever / chills / sweats. No weight loss. No visual change, eye pain, eye discharge. No oral lesion, sore throat, dysphagia. Denies cough / sputum/Sob   Denies chest pain, palpitations/ dizziness  Denies nausea/ vomiting/abdominal pain/diarrhea.   Denies dysuria or change in urinary function. Denies joint swelling or pain. No myalgia, arthralgia. No rashes, skin lesions   Denies focal weakness, sensory change or other neurologic symptoms  No lymph node swelling or tenderness.     Fevers, chills, confusion, on chemo         PHYSICAL EXAM:      Vitals:  T max 104.3   /63   Pulse 74   Temp 98.2 °F (36.8 °C) (Oral)   Resp 18   Ht 5' 4\" (1.626 m)   Wt 176 lb 12.9 oz (80.2 kg)   SpO2 96%   BMI 30.35 kg/m²   General Appearance: awake able to  respond and able to converse  and in some  acute distress, ++ pallor, no icterus    Skin: warm and dry, no rash or erythema  Head: normocephalic and atraumatic  Eyes: congenital blindness++   ENT: tympanic membrane, external ear and ear canal normal bilaterally, nose without deformity, nasal mucosa and turbinates normal without polyps  Neck: supple and non-tender without mass, no thyromegaly  no cervical lymphadenopathy  Pulmonary/Chest: clear to auscultation bilaterally- no wheezes, rales or rhonchi, normal air movement, no respiratory distress  Cardiovascular: rhythm, normal S1 and S2, no murmurs, rubs, clicks, or gallops, no carotid bruits  Abdomen: soft, non-tender, non-distended, normal bowel sounds, no masses or organomegaly  Extremities: no cyanosis, clubbing or edema  Musculoskeletal: normal range of motion, no joint swelling, deformity or tenderness  Neurologic: reflexes normal and symmetric, no cranial nerve deficit  Lines:  Chest port in place     Sternal area shallow ulcer with scab+   Bi lateral lower leg with venous stasis changes           Data Review:    Lab Results   Component Value Date    WBC 4.7 03/05/2020    HGB 11.6 (L) 03/05/2020    HCT 33.1 (L) 03/05/2020    MCV 87.2 03/05/2020    PLT 28 (L) 03/05/2020     Lab Results   Component Value Date    CREATININE 0.9 03/04/2020    BUN 20 03/04/2020     03/04/2020    K 3.5 03/04/2020     (H) 03/04/2020    CO2 21 03/04/2020       Hepatic Function Panel:   Lab Results   Component Value Date    ALKPHOS 46 03/04/2020    ALT 21 03/04/2020    AST 57 03/04/2020    PROT 5.6 03/04/2020    PROT 6.4 07/20/2017    BILITOT 0.7 03/04/2020    LABALBU 2.6 03/04/2020       UA:  Lab Results   Component Value Date    COLORU YELLOW 03/03/2020    CLARITYU Clear 03/03/2020    GLUCOSEU >=1000 03/03/2020    BILIRUBINUR Negative 03/03/2020    KETUA Negative 03/03/2020    SPECGRAV 1.026 03/03/2020    BLOODU TRACE 03/03/2020    PHUR 5.0 03/03/2020    PROTEINU 30 03/03/2020    UROBILINOGEN 0.2 03/03/2020    NITRU Negative 03/03/2020    LEUKOCYTESUR Negative 03/03/2020    LABMICR YES 03/03/2020    URINETYPE Cleancatch 03/03/2020      Urine Microscopic:   Lab Results   Component Value Date    HYALCAST 7 03/03/2020    WBCUA 1 03/03/2020    RBCUA 1 03/03/2020    EPIU 1 03/03/2020     Creat  1.5      Lactic acid 2.9      Procal  9.22      Lipase  66   MICRO: cultures reviewed and updated by me   Procedure Component Value Units Date/Time   Culture, Blood 2 [[id number]] Collected: 03/03/20 5124   Order Status: Completed Specimen: Blood Updated: 03/04/20 0715    Culture, Blood 2 No Growth to date.  Any change in status will be called. Narrative:     ORDER#: [id number]                          ORDERED BY: SAVANNAH England  SOURCE: Blood                              COLLECTED:  03/03/20 06:08  ANTIBIOTICS AT [facility]ER. :                      RECEIVED :  03/03/20 06:12  If child <=2 yrs old please draw pediatric bottle. ~Blood Culture #2  Performed at:  [facility]  [address] [place] [facility] [address]   Phone [phone]   Culture, Blood 1 [[id number]] Collected: 03/03/20 0537   Order Status: Completed Specimen: Blood Updated: 03/04/20 0615    Blood Culture, Routine No Growth to date.  Any change in status will be called.    Narrative:     ORDER#: [id number]                          ORDERED BY: David Erickson  SOURCE: Blood                              COLLECTED:  03/03/20 atelectasis.                All the pertinent images and reports for the current Hospitalization were reviewed by me     Scheduled Meds:   vancomycin  1,250 mg Intravenous Q16H    sodium chloride flush  10 mL Intravenous 2 times per day    acetaminophen  650 mg Oral Once    aspirin  81 mg Oral Daily    cetirizine  10 mg Oral Daily    rosuvastatin  40 mg Oral QPM    insulin lispro  0-6 Units Subcutaneous TID WC    insulin lispro  0-3 Units Subcutaneous Nightly    famotidine (PEPCID) injection  20 mg Intravenous Daily    vancomycin (VANCOCIN) intermittent dosing (placeholder)   Other RX Placeholder    acyclovir  10 mg/kg (Adjusted) Intravenous Q12H    piperacillin-tazobactam  3.375 g Intravenous Q8H       Continuous Infusions:   dextrose      sodium chloride 75 mL/hr at 03/04/20 0521       PRN Meds:  glucose, dextrose, glucagon (rDNA), dextrose, sodium chloride flush, acetaminophen **OR** acetaminophen, polyethylene glycol, sennosides-docusate sodium, ondansetron, acetaminophen      Assessment:     Patient Active Problem List   Diagnosis    [facility] ([facility]    Hypogammaglobulinemia (HCC)    CLL (chronic lymphoid leukemia) in relapse ([facility]    Congenital blindness    Encounter for long-term (current) use of high-risk medication    Chronic lymphocytic leukemia of B-cell type in relapse ([facility]    High risk medication use    Fever of unknown origin    DMII (diabetes mellitus, type 2) ([facility]    Hypertension    Hyperlipidemia    Pneumonia due to organism    CAP (community acquired pneumonia)    Fever and chills    Status post chemotherapy    CLL (chronic lymphocytic leukemia) (HCC)       Sepsis  High fevers  Immune suppressed  Lactic acidosis  DEEPTHI  High Procal   CLL relapsed  Low IgG on replacement   Chest port in place  H/o skin cancer  Congenital blindness  Change in Mentation +         He remains very ill from on going sepsis and fevers, and he is immune suppressed risk for Atypical infections high     Mental status slowly improving and fevers trending down WBC low likely post chemo and we can hold off on LP given the improvement in mentation and this seems to be from high fevers     Will follow cx to guide IV abx therapy watch WBC for any severe Neutropenia, it looks like WBC improved and platelets continue to be low and will watch       CX have been negative and will be able to d/c IV abx one at a time and observe - Procal trending down        Labs, Microbiology, Radiology and all the pertinent results from current hospitalization and  care every where were reviewed  by me as a part of the evaluation   Plan:   1. Cont IV Vancomycin x 1 gm Q 16 HRS   2. Cont IV Zosyn pending final cx  3. D/C IV Acyclovir TODAY   4. CNS infection seems less likely given the rapid improvement in mentation after the fever resolved  5. Received  IVIG on admit   6. Resp virus PCR  -ve  - rapid flu antigen  -ve  7. urine for Legionella, Pneumococcal antigen -ve  8. Blood cx in process  9. Pro jim trending down       Discussed with patient/Family and Nursing     Risk of Complications/Morbidity: High      · Illness(es)/ Infection present that pose threat to bodily function. · There is potential for severe exacerbation of infection/side effects of treatment. · Therapy requires intensive monitoring for antimicrobial agent toxicity. Discussed with patient/Family and Nursing staff     Thanks for allowing me to participate in your patient's care and please call me with any questions or concerns.     Meggan Gruber MD  Infectious Disease  [facility] Physician  Phone: [phone]   Fax : [phone]

## 2020-03-05 NOTE — PROGRESS NOTES
[facility]  On[facility] Hematology Care  Progress Note      SUBJECTIVE:   Pt is doing much better overall  He is having less fevers  HE has no positive cultures  IM not sure if he had a bad febrile reaction to [place]? Maybe?    COunts pending this am       ROS: no sweats, no nausea, vomiting, diarrhea  shortness of breath chest pain or other pain  OBJECTIVE      Medications    Current Facility-Administered Medications: vancomycin (VANCOCIN) 1250 mg in dextrose 5 % 250 mL IVPB, 1,250 mg, Intravenous, Q16H  glucose (GLUTOSE) 40 % oral gel 15 g, 15 g, Oral, PRN  dextrose 50 % IV solution, 12.5 g, Intravenous, PRN  glucagon (rDNA) injection 1 mg, 1 mg, Intramuscular, PRN  dextrose 5 % solution, 100 mL/hr, Intravenous, PRN  sodium chloride flush 0.9 % injection 10 mL, 10 mL, Intravenous, 2 times per day  sodium chloride flush 0.9 % injection 10 mL, 10 mL, Intravenous, PRN  acetaminophen (TYLENOL) tablet 650 mg, 650 mg, Oral, Q6H PRN **OR** acetaminophen (TYLENOL) suppository 650 mg, 650 mg, Rectal, Q6H PRN  0.9 % sodium chloride infusion, , Intravenous, Continuous  polyethylene glycol (GLYCOLAX) packet 17 g, 17 g, Oral, Daily PRN  sennosides-docusate sodium (SENOKOT-S) 8.6-50 MG tablet 2 tablet, 2 tablet, Oral, Daily PRN  ondansetron (ZOFRAN) injection 4 mg, 4 mg, Intravenous, Q6H PRN  acetaminophen (TYLENOL) tablet 650 mg, 650 mg, Oral, Once  aspirin chewable tablet 81 mg, 81 mg, Oral, Daily  cetirizine (ZYRTEC) tablet 10 mg, 10 mg, Oral, Daily  rosuvastatin (CRESTOR) tablet 40 mg, 40 mg, Oral, QPM  insulin lispro (HUMALOG) injection vial 0-6 Units, 0-6 Units, Subcutaneous, TID WC  insulin lispro (HUMALOG) injection vial 0-3 Units, 0-3 Units, Subcutaneous, Nightly  famotidine (PEPCID) injection 20 mg, 20 mg, Intravenous, Daily  vancomycin (VANCOCIN) intermittent dosing (placeholder), , Other, RX Placeholder  acyclovir (ZOVIRAX) 685 mg in dextrose 5 % 100 mL IVPB, 10 mg/kg (Adjusted), Intravenous, Q12H  acetaminophen 03/04/20  0445    144   K 4.3 3.5    111*   CO2 18* 21   BUN 28* 20   CREATININE 1.5* 0.9     Recent Labs     03/03/20  0537 03/04/20  0445   AST 57* 57*   ALT 19 21   BILITOT 0.5 0.7   ALKPHOS 65 46       Magnesium:  No results found for: MG    Imaging Xr Chest Standard (2 Vw)    Result Date: 3/3/2020  EXAMINATION: TWO XRAY VIEWS OF THE CHEST 3/3/2020 5:06 am COMPARISON: Chest radiograph 12/09/2019; CT chest 01/23/2020 HISTORY: ORDERING SYSTEM PROVIDED HISTORY: sob TECHNOLOGIST PROVIDED HISTORY: Reason for exam:->sob Reason for Exam: sob FINDINGS: Lungs are mildly hypoexpanded, contributing to bronchovascular crowding and basilar atelectasis. No pneumothorax or pleural effusion. Normal heart size and mediastinal contours. Left chest port loops over the left IJ central venous catheter via for terminating over the SVC. Left axillary surgical clips. Shallow inspiratory effort with basilar atelectasis. Ct Head Wo Contrast    Result Date: 3/3/2020  EXAMINATION: CT OF THE HEAD WITHOUT CONTRAST  3/3/2020 5:08 am TECHNIQUE: CT of the head was performed without the administration of intravenous contrast. Dose modulation, iterative reconstruction, and/or weight based adjustment of the mA/kV was utilized to reduce the radiation dose to as low as reasonably achievable. COMPARISON: CT head 12/24/2018 HISTORY: ORDERING SYSTEM PROVIDED HISTORY: ams TECHNOLOGIST PROVIDED HISTORY: Reason for exam:->ams Has a \"code stroke\" or \"stroke alert\" been called? ->No Reason for Exam: ams FINDINGS: BRAIN/VENTRICLES: There is no acute intracranial hemorrhage, mass effect or midline shift. No abnormal extra-axial fluid collection. The gray-white differentiation is maintained without evidence of an acute infarct. There is no evidence of hydrocephalus. No substantial change in subcortical low attenuation in the posterior left frontal lobe. Stable brain volume, sulci and ventricles. ORBITS: Bilateral phthisis bulbi.

## 2020-03-05 NOTE — PROGRESS NOTES
Clinical Pharmacy Note  Vancomycin Consult    Dannie Cleary is a 76 y.o. male ordered Vancomycin for sepsis; consult received from Dr. Mary Renee to manage therapy. Also receiving Zosyn. Patient Active Problem List   Diagnosis    [facility] lymphocytic [facility] ([facility]    Hypogammaglobulinemia (HCC)    CLL (chronic lymphoid leukemia) in relapse ([facility]    Congenital blindness    Encounter for long-term (current) use of high-risk medication    Chronic lymphocytic leukemia of B-cell type in relapse ([facility]    High risk medication use    Fever of unknown origin    DMII (diabetes mellitus, type 2) ([facility]    Hypertension    Hyperlipidemia    Pneumonia due to organism    CAP (community acquired pneumonia)    Fever and chills    Status post chemotherapy    CLL (chronic lymphocytic leukemia) ([facility])       Allergies:  Patient has no known allergies. Temp max:  Temp (24hrs), Av.3 °F (36.8 °C), Min:98 °F (36.7 °C), Max:98.7 °F (37.1 °C)      Recent Labs     20  0537 20  0445 20  0850   WBC 7.4 1.7* 4.7       Recent Labs     20  0537 20  0445 20  1535   BUN 28* 20  --    CREATININE 1.5* 0.9 0.9         Intake/Output Summary (Last 24 hours) at 3/5/2020 1643  Last data filed at 3/5/2020 1422  Gross per 24 hour   Intake 2577.5 ml   Output 200 ml   Net 2377.5 ml           Ht Readings from Last 1 Encounters:   20 5' 4\" (1.626 m)        Wt Readings from Last 1 Encounters:   20 176 lb 12.9 oz (80.2 kg)         Estimated Creatinine Clearance: 69 mL/min (based on SCr of 0.9 mg/dL). Assessment/Plan:  Will contnue vancomycin 1250 mg IV every 16 hours. Regimen projects a trough level of 15-20 mg/L. Timing of trough level will be determined based on culture results, renal function, and clinical response. Thank you for the consult. Will continue to follow.   YUE Dumont [facility]  3/5/2020  4:44 PM

## 2020-03-05 NOTE — PLAN OF CARE
Problem: Falls - Risk of:  Goal: Will remain free from falls  Description  Will remain free from falls  Outcome: Ongoing     Problem: Falls - Risk of:  Goal: Absence of physical injury  Description  Absence of physical injury  Outcome: Ongoing    Patient uses call light appropriately to express needs. Bed to lowest position with door open and call light in reach. All fall precautions implemented at this time. Bed alarm on for safety. Siderails up x2. Non skid footwear in place. Tele camera in place. Seen at intervals to ensure safety. All needs attended.        Problem: Discharge Planning:  Goal: Ability to perform activities of daily living will improve  Description  Ability to perform activities of daily living will improve  Outcome: Ongoing     Problem: Discharge Planning:  Goal: Participates in care planning  Description  Participates in care planning  Outcome: Ongoing

## 2020-03-05 NOTE — PLAN OF CARE
Problem: Falls - Risk of:  Goal: Will remain free from falls  Description  Will remain free from falls  3/5/2020 0917 by Obie Long RN  Outcome: Ongoing  3/4/2020 2311 by Katie Loya RN  Outcome: Ongoing  Goal: Absence of physical injury  Description  Absence of physical injury  3/5/2020 0917 by Obie Long RN  Outcome: Ongoing  3/4/2020 2311 by Katie Loya RN  Outcome: Ongoing   Pt free from falls this shift. Fall precautions in place at all times. Call light within reach. Pt able to and agreeable to contact for safety appropriately.

## 2020-03-06 PROBLEM — R41.0 ACUTE DELIRIUM: Status: ACTIVE | Noted: 2020-01-01

## 2020-03-06 PROBLEM — R41.0 ACUTE DELIRIUM: Status: RESOLVED | Noted: 2020-01-01 | Resolved: 2020-01-01

## 2020-03-06 NOTE — PROGRESS NOTES
No myalgia, arthralgia. No rashes, skin lesions   Denies focal weakness, sensory change or other neurologic symptoms  No lymph node swelling or tenderness.     Fevers, chills, confusion, on chemo         PHYSICAL EXAM:      Vitals:  T max 104.3   /62   Pulse 67   Temp 97.7 °F (36.5 °C) (Oral)   Resp 18   Ht 5' 4\" (1.626 m)   Wt 186 lb 4.6 oz (84.5 kg)   SpO2 98%   BMI 31.98 kg/m²   General Appearance: awake able to  respond and able to converse  and in some  acute distress, ++ pallor, no icterus    Skin: warm and dry, no rash or erythema  Head: normocephalic and atraumatic  Eyes: congenital blindness++   ENT: tympanic membrane, external ear and ear canal normal bilaterally, nose without deformity, nasal mucosa and turbinates normal without polyps  Neck: supple and non-tender without mass, no thyromegaly  no cervical lymphadenopathy  Pulmonary/Chest: clear to auscultation bilaterally- no wheezes, rales or rhonchi, normal air movement, no respiratory distress  Cardiovascular: rhythm, normal S1 and S2, no murmurs, rubs, clicks, or gallops, no carotid bruits  Abdomen: soft, non-tender, non-distended, normal bowel sounds, no masses or organomegaly  Extremities: no cyanosis, clubbing or edema  Musculoskeletal: normal range of motion, no joint swelling, deformity or tenderness  Neurologic: reflexes normal and symmetric, no cranial nerve deficit  Lines:  Chest port in place     Sternal area shallow ulcer with scab+   Bi lateral lower leg with venous stasis changes           Data Review:    Lab Results   Component Value Date    WBC 2.9 (L) 03/06/2020    HGB 9.9 (L) 03/06/2020    HCT 27.9 (L) 03/06/2020    MCV 87.7 03/06/2020    PLT 23 (L) 03/06/2020     Lab Results   Component Value Date    CREATININE 0.9 03/05/2020    BUN 20 03/04/2020     03/04/2020    K 3.5 03/04/2020     (H) 03/04/2020    CO2 21 03/04/2020       Hepatic Function Panel:   Lab Results   Component Value Date    ALKPHOS 55  RECEIVED :  03/03/20 05:44  If child <=2 yrs old please draw pediatric bottle. ~Blood Culture #1  Performed at:  [facility]  [address] [facility] [address] [place]    Phone [phone]   Rapid Influenza A/B Antigens [[id number]] Collected: 03/03/20 2226   Order Status: Completed Specimen: Nose from Nasopharyngeal Updated: 03/03/20 2227    Rapid Influenza A Ag Negative    Rapid Influenza B Ag Negative   Narrative:     Performed at:  [facility]  [address], [place] [address]   Phone [phone]   Respiratory Panel, Molecular [[id number]] Collected: 03/03/20 2135   Order Status: Sent Specimen: Nose from Nasopharyngeal Updated: 03/03/20 2157   Respiratory Panel, Molecular [[id number]] Collected: 03/03/20 2157   Order Status: Sent Specimen: Nasopharyngeal    Legionella Antigen, Urine [[id number]] Collected: 03/03/20 0544   Order Status: Sent Specimen: Urine voided Updated: 03/03/20 2120   Legionella Antigen, Urine [[id number]]    Order Status: Sent Specimen: Urine, clean catch    Strep Pneumoniae Antigen [[id number]]    Order Status: Sent Specimen: Urine, clean catch    Culture, Fungus, Blood [[id number]] Collected: 03/03/20 1458   Order Status: Sent Specimen: Blood Updated: 03/03/20 1953   HSV PCR [[id number]] Collected: 03/03/20 1458   Order Status: Completed Specimen: Blood Updated: 03/03/20 1522    HSV Source 1   Narrative:     Referred out by:  [facility]  [address], [place]    Phone [phone]   Culture, CSF [[id number]]    Order Status: Sent Specimen: CSF    Meningitis/Encephalitis Panel, CSF [[id number]]    Order Status: Sent Specimen: CSF            IMAGING:    CT Head WO Contrast   Final Result   No acute intracranial abnormality. XR CHEST STANDARD (2 VW)   Final Result   Shallow inspiratory effort with basilar atelectasis.                All the pertinent images and reports for the current Hospitalization were reviewed by me     Scheduled Meds:   vancomycin  1,250 mg Intravenous Q16H    sodium chloride flush  10 mL Intravenous 2 times per day    acetaminophen  650 mg Oral Once    aspirin  81 mg Oral Daily    cetirizine  10 mg Oral Daily    rosuvastatin  40 mg Oral QPM    insulin lispro  0-6 Units Subcutaneous TID WC    insulin lispro  0-3 Units Subcutaneous Nightly    famotidine (PEPCID) injection  20 mg Intravenous Daily    vancomycin (VANCOCIN) intermittent dosing (placeholder)   Other RX Placeholder    piperacillin-tazobactam  3.375 g Intravenous Q8H       Continuous Infusions:   dextrose         PRN Meds:  glucose, dextrose, glucagon (rDNA), dextrose, sodium chloride flush, acetaminophen **OR** acetaminophen, polyethylene glycol, sennosides-docusate sodium, ondansetron, acetaminophen      Assessment:     Patient Active Problem List   Diagnosis    [facility] lymphocytic leukemia ([facility] [address]    Hypogammaglobulinemia (HCC)    CLL (chronic lymphoid leukemia) in relapse (HCC)    Congenital blindness    Encounter for long-term (current) use of high-risk medication    Chronic lymphocytic leukemia of B-cell type in relapse (HCC)    High risk medication use    Fever of unknown origin    DMII (diabetes mellitus, type 2) (HCC)    Hypertension    Hyperlipidemia    Pneumonia due to organism    CAP (community acquired pneumonia)    Status post chemotherapy    CLL (chronic lymphocytic leukemia) (HCC)       Sepsis  High fevers  Immune suppressed  Lactic acidosis  DEEPTHI  High Procal   CLL relapsed  Low IgG on replacement   Chest port in place  H/o skin cancer  Congenital blindness  Change in Mentation +           Mental status slowly improving and fevers trending down WBC low likely post chemo and we can hold off on LP given the improvement in mentation and this seems to be from high fevers     Will follow cx to guide IV abx therapy watch WBC for any severe Neutropenia, it looks like WBC improved and platelets continue to be low and will watch       CX have been negative and will be able to d/c IV abx one at a time and observe - Procal trending down      Clinically doing better and cx negative would be ok for oral abx for home going d/w [address], Microbiology, Radiology and all the pertinent results from current hospitalization and  care every where were reviewed  by me as a part of the evaluation   Plan:   1. D/c  IV Vancomycin    2. D/c  IV Zosyn pending final cx  3. Home on oral levofloxacin x 500 mg x 5 days  4. CNS infection seems less likely given the rapid improvement in mentation after the fever resolved  5. Received  IVIG on admit   6. Resp virus PCR  -ve  - rapid flu antigen  -ve  7. urine for Legionella, Pneumococcal antigen -ve  8. WBC low will be getting labs on Monday       Discussed with patient/Family and Nursing     Discussed with patient/Family and Nursing staff     Thanks for allowing me to participate in your patient's care and please call me with any questions or concerns.     Padma Harris MD  Infectious Disease  [facility]) Physician  Phone: [phone]   Fax : [phone]

## 2020-03-06 NOTE — FLOWSHEET NOTE
Discharge instructions and medications reviewed with patient and wife. Both verbalized understanding. All questions answered. 1 paper prescription given to wife. De-accessed port to left chest. Pt. tolerated well. Discharge paperwork given to wife. Friends here to transport patient home.  Electronically signed by Milo Turpin RN on 3/6/2020 at 6:20 PM

## 2020-03-06 NOTE — PROGRESS NOTES
Occupational Therapy  Facility/Department: [address] MED SURG  Daily Treatment Note  NAME: Elizabeth Morel  : 1945  MRN: [id number]    Date of Service: 3/6/2020    Discharge Recommendations:  24 hour supervision or assist  OT Equipment Recommendations  Equipment Needed: No     Elizabeth oMrel scored a 21/24 on the [address] form. Current research shows that an AM-PAC score of 18 or greater is typically associated with a discharge to the patient's home setting. Based on the patients AM-PAC score and their current ADL deficits, it is recommended that the patient have 2-3 sessions per week of Occupational Therapy at d/c to increase the patients independence. Assessment   Performance deficits / Impairments: Decreased ADL status; Decreased functional mobility ; Decreased strength;Decreased balance;Decreased high-level IADLs;Decreased endurance;Decreased vision/visual deficit    Assessment: Pt tolerated OT session well. Pt progressing with standing tolerance and balance. Cognition greatly improved since admission with mild memory deficits still present. Pt tolerated 150 feet ambulation. Pt grasps onto therapist elbow during ambulation. Pt did not require physical assist to maintain balance, only guidance for vision impairment. Pt progressing with OT goals. Recommend continued OT services to increase safety and independence with ADLs and functional transfers. Anticipate pt willbe safe to return home at discharge. Prognosis: Good    OT Education: OT Role;Plan of Care;Transfer Training  Patient Education: home safety concerns, OT discharge recommendations  Barriers to Learning: pt verbalizes understanding. REQUIRES OT FOLLOW UP: Yes    Activity Tolerance  Activity Tolerance: Patient Tolerated treatment well    Safety Devices  Safety Devices in place: Yes  Type of devices: Call light within reach; Chair alarm in place; Left in chair;Gait belt;Nurse notified         Patient Diagnosis(es): The primary encounter diagnosis was Altered mental status, unspecified altered mental status type. Diagnoses of Lactic acid acidosis, DEEPTHI (acute kidney injury) ([facility] [address] and Febrile illness were also pertinent to this visit. has a past medical history of Anxiety, Blind, Blood disorder, Cancer ([facility] [address] Diabetes mellitus ([facility] [address] Encounter for long-term (current) use of high-risk medication, Glaucoma, Hyperlipidemia, and Hypertension. has a past surgical history that includes pre-malignant / benign skin lesion excision; Tunneled venous port placement (Left, 2006); Hand surgery (Left, 10/16/14); lymph node dissection (2008); fine needle aspiration (2006); Tunneled venous port placement (2006); and Appendectomy. Restrictions  Restrictions/Precautions  Restrictions/Precautions: Fall Risk  Position Activity Restriction  Other position/activity restrictions: neutropenic precautions. Pt is blind and [place]     Subjective   General  Chart Reviewed: Yes  Patient assessed for rehabilitation services?: Yes    Additional Pertinent Hx: Per ED notes: \"Mariano Worthington is a 76 y.o. male who presents to the emergency department for evaluation of altered mental status and hypoglycemia. Patient has a history of CLL and recently was started on new chemotherapy last session was approximate 2 days ago. . Wife reports that the patient began having fevers the previous day. They spoke to the on-call oncologist who informed them that her fever could be normal after the chemotherapy, she states the patient was not complaining of any pain or infectious symptoms. This morning the patient was found wandering the house confused. The wife states that the patient has been confused when he had fevers in the past.  EMS was called and the patient was noted to be hypoglycemic with a blood glucose of 298.   \"     Response to previous treatment: Patient with no complaints from previous session  Family / Caregiver Present: Yes(wife)  Referring Practitioner: Cody Sanchez Jin Werner MD    Diagnosis: Pt admitted with AMS, hypoglycemia, sepsis fever. PMH: CLL    Subjective  Subjective: Pt seated in chair upon OT arrival. Wife reports that cognition is greatly improved however pt still has mild memory difficulties. General Comment  Comments: Per RN ok for therapy    Pre Treatment Pain Screening  Intervention List: Patient able to continue with treatment  Vital Signs  Patient Currently in Pain: Denies   Objective             Balance  Sitting Balance: Independent  Standing Balance: Contact guard assistance(pt does not have white cane at hospital. Instead of grasps therapists L elbow to use as a guide due to vision impairment. )  Standing Balance  Time: 3-4 minutes  Activity: 150 feet mobility in hallway   Comment: CGA with pt holding onto therpists L elbow to use as seeing guide for environmental navigation. Pt blind and does not have his white cane in the hospital. Pt does not require steadying/physical assist to maintain balance; only guidance for vision impairment. Functional Mobility  Functional - Mobility Device: No device  Activity: Other  Assist Level: Contact guard assistance  Functional Mobility Comments: CGA with pt holding onto therpists L elbow to use as seeing guide for environmental navigation. Pt blind and does not have his white cane in the hospital. Pt does not require steadying/physical assist to maintain balance; only guidance for vision impairment. Transfers  Sit to stand: Modified independent  Stand to sit: Modified independent     Cognition  Overall Cognitive Status: Exceptions  Arousal/Alertness: Appropriate responses to stimuli  Following Commands:  Follows one step commands consistently  Attention Span: Appears intact  Memory: Decreased short term memory  Safety Judgement: Good awareness of safety precautions  Problem Solving: Able to problem solve independently  Insights: Fully aware of deficits  Initiation: Does not require cues  Sequencing: Does not

## 2020-03-06 NOTE — PROGRESS NOTES
Physical Therapy    Daily Treatment Note    [facility]: [id number]  Room Number: F2E-6055/8462-40    ASSESSMENT: Evelia Daniels is a 76 y.o. M admit 3/3/20 with altered mental status and hypoglycemia -- pt's spouse found him wandering around the house. Pt with CLL (currently relapsed and with multiple lines) -- at risk for atypical infections. Prior to admit pt was living at home with his spouse and ambulating independently with a cane for the blind. Today he ambulated steadily with CGA/SBA (due to not having his cane for the blind) up to 150' steadily. Recommend that paytient return home with PRN assist and level 1 home PT. Will follow. Discharge Recommendations: S Level 1, Patient would benefit from continued therapy after discharge, Continue to assess pending progress, 24 hour supervision or assist  Equipment Needs: Equipment Needed: No    Admission Date: 3/3/2020  4:53 AM    Additional Pertinent Hx: Evelia Daniels is a 76 y.o. M admit 3/3/20 with altered mental status and hypoglycemia -- pt's spouse found him wandering around the house. Pt with CLL (currently relapsed and with multiple lines) -- at risk for atypical infections. Diagnosis: Altered mental status, hypoglycemia, Febrile illness/encephalopathy from infection/early sepsis. Restrictions/Precautions: Fall Risk Other position/activity restrictions: neutropenic precautions. Pt is blind and [place]     PMHx:  has a past medical history of Anxiety, Blind, Blood disorder, Cancer ([address] Diabetes mellitus ([address] Encounter for long-term (current) use of high-risk medication, Glaucoma, Hyperlipidemia, and Hypertension.   PSgHx:   Past Surgical History:   Procedure Laterality Date    APPENDECTOMY      FINE NEEDLE ASPIRATION  2006    HAND SURGERY Left 10/16/14    excision left thumb lesion with skin graft/trigger finger release left thumb and 5th finger    LYMPH NODE DISSECTION  2008    PRE-MALIGNANT / BENIGN SKIN LESION EXCISION      : excision 3.4 cm right shoulder lesion and 1.7 cm incisional biopsy left thumb    TUNNELED VENOUS PORT PLACEMENT Left 2006    TUNNELED VENOUS PORT PLACEMENT  2006       Home Environment / Functional History  Social/Functional History  Lives With: Spouse  Type of Home: (condo)  Home Layout: One level  Home Access: Stairs to enter with rails  Entrance Stairs - Number of Steps: 8 steps down into condo with zuleima handrail  Entrance Stairs - Rails: Both  Bathroom Shower/Tub: Walk-in shower  Bathroom Toilet: Standard(vanity next to toilet)  Bathroom Equipment: Built-in shower seat  Home Equipment: (white cane)  ADL Assistance: Independent  Homemaking Assistance: (spouse assists with IADLs d/t vision impairment)  Ambulation Assistance: Independent(with cane)  Transfer Assistance: Independent  Active : No  Additional Comments: Pt poor historian- need to verify accuracy of social history. Restrictions  Restrictions/Precautions: Fall Risk        Other position/activity restrictions: neutropenic precautions. Pt is blind and [place]    SUBJECTIVE: Pt sitting up in recliner chair; willing to get up and ambulate. Pain: Patient Currently in Pain: Denies      OBJECTIVE:    Transfers  Sit to Stand: Supervision  Stand to sit: Supervision    Ambulation  Ambulation  Ambulation?: Yes  Ambulation 1  Device: No Device  Assistance: Contact guard assistance, Stand by assistance(CGA at patient's Right elbow only to help with directing patient as he did not have his cane for the blind)  Quality of Gait: toe out and hind-foot valgus posture, short steps, reduced foot clearance, slow speed, steady in turns and straight path, R hand in mid-guard position to help with apprehending objects (compensatory for sightlessness)  Distance: 150'  Comments: steady    Stairs  Stairs/Curb  Stairs?: No    Exercises   Exercises  Comments: SEATED: manually resisted LAQ x 10 reps zuleima.     Other Activities:  Comment: Pt's compression stockings donned for him; pt's shoes donned for him. Both prior to ambulation. Pt set up with his meal at the end of session. Neuro/balance  Balance  Sitting - Static: Good  Standing - Static: Good, -      Safety Devices: Type of devices: Call light within reach, Left in chair(care transferred to RN in room)      ASSESSMENT:   Dilan Joyner is a 76 y.o. M admit 3/3/20 with altered mental status and hypoglycemia -- pt's spouse found him wandering around the house. Pt with CLL (currently relapsed and with multiple lines) -- at risk for atypical infections. Prior to admit pt was living at home with his spouse and ambulating independently with a cane for the blind. Today he ambulated steadily with CGA/SBA (due to not having his cane for the blind) up to 150' steadily. Recommend that paytient return home with PRN assist and level 1 home PT. Will follow. Rusty Barthel scored a 21/24 on the AM-PAC short mobility form. Initial research suggests that an AM-PAC score of 18 or greater may be associated with a discharge to patient's home setting. However in this initial research, cut off scores do not perfectly predict discharge location: 25% of patients with a score of 18 or greater actually went to a rehab facility and 20% of people with a score less than 18 actually went home. Based on my clinical judgement I recommend that the patient have level 1 home Physical Therapy at d/c to increase the patients independence.     Goals:  Time Frame for Short term goals: upon d/c - all goals ongoing 03/06/20 except as noted below  Short term goal 1: sup<>sit Ind   Short term goal 2: sit<>stand Ind - MET 3/6   Short term goal 3: amb 150' with cane for the blind and Mod Ind  Short term goal 4: up/down 8 steps with hand rail and CGA         Safety devices:   Type of devices: Call light within reach, Left in chair(care transferred to RN in room)        PLAN:  Times per week: 3-5x/wk while in the hospital;    Current Treatment Recommendations: Functional Mobility Training    If patient is discharged prior to next treatment, this note will serve as the discharge summary.     Therapy Time    Individual Concurrent Group Co-treatment   Time In 6800            Time Out 1222          Minutes 38             Timed Code Treatment Minutes: 45 Minutes      Mariano Garg, PT

## 2020-03-06 NOTE — DISCHARGE INSTR - COC
lymphoid leukemia) in relapse ([facility]) C91.92    Congenital blindness H54.7    Encounter for long-term (current) use of high-risk medication Z79.899    Chronic lymphocytic leukemia of B-cell type in relapse ([facility]) C91.12    High risk medication use Z79.899    Fever of unknown origin R50.9    DMII (diabetes mellitus, type 2) ([facility]) E11.9    Hypertension I10    Hyperlipidemia E78.5    Pneumonia due to organism J18.9    CAP (community acquired pneumonia) J18.9    Status post chemotherapy Z92.21    CLL (chronic lymphocytic leukemia) ([facility]) C91.10       Isolation/Infection:   Isolation          No Isolation        Patient Infection Status     None to display          Nurse Assessment:  Last Vital Signs: /60   Pulse 75   Temp 98.2 °F (36.8 °C) (Oral)   Resp 18   Ht 5' 4\" (1.626 m)   Wt 186 lb 4.6 oz (84.5 kg)   SpO2 95%   BMI 31.98 kg/m²     Last documented pain score (0-10 scale): Pain Level: 0  Last Weight:   Wt Readings from Last 1 Encounters:   03/06/20 186 lb 4.6 oz (84.5 kg)     Mental Status:  {IP PT MENTAL STATUS:47999}    IV Access:  { [place] IV ACCESS:806952564}    Nursing Mobility/ADLs:  Walking   {CHP DME MNQV:890583463}  Transfer  {CHP DME UCCX:234191337}  Bathing  {CHP DME AUSS:668132645}  Dressing  {CHP DME MEVF:572637535}  Toileting  {CHP DME QLHD:175248915}  Feeding  {P DME URTU:070261168}  Med Admin  {P DME JYPH:178834877}  Med Delivery   { [place] MED Delivery:441139715}    Wound Care Documentation and Therapy:        Elimination:  Continence:   · Bowel: {YES / GO:33691}  · Bladder: {YES / IT:28568}  Urinary Catheter: {Urinary Catheter:666308495}   Colostomy/Ileostomy/Ileal Conduit: {YES / MN:75024}       Date of Last BM: ***    Intake/Output Summary (Last 24 hours) at 3/6/2020 1528  Last data filed at 3/6/2020 1345  Gross per 24 hour   Intake 840 ml   Output --   Net 840 ml     I/O last 3 completed shifts:   In: 840 [P.O.:840]  Out: -     Safety Concerns:     [address] Concerns:988328413}    Impairments/Disabilities:      [address] Impairments/Disabilities:717386708}    Nutrition Therapy:  Current Nutrition Therapy:   [address] [place] Diet List:853429954}    Routes of Feeding: {CHP DME Other Feedings:953589976}  Liquids: {Slp liquid thickness:90822}  Daily Fluid Restriction: {CHP DME Yes amt example:087075698}  Last Modified Barium Swallow with Video (Video Swallowing Test): {Done Not Done QRQY:378477334}    Treatments at the Time of Hospital Discharge:   Respiratory Treatments: ***  Oxygen Therapy:  {Therapy; copd oxygen:03786}  Ventilator:    { CC Vent NKKX:410934292}    Rehab Therapies: {THERAPEUTIC INTERVENTION:0971161909}  Weight Bearing Status/Restrictions: [address]  Weight Bearin}  Other Medical Equipment (for information only, NOT a DME order):  {EQUIPMENT:276028470}  Other Treatments: ***    Patient's personal belongings (please select all that are sent with patient):  {P DME Belongings:261388736}    RN SIGNATURE:  {Esignature:269641325}    CASE MANAGEMENT/SOCIAL WORK SECTION    Inpatient Status Date: ***    Readmission Risk Assessment Score:  Readmission Risk              Risk of Unplanned Readmission:        19           Discharging to Facility/ Agency   · Name:   · Address:  · Phone:  · Fax:    Dialysis Facility (if applicable)   · Name:  · Address:  · Dialysis Schedule:  · Phone:  · Fax:    / signature: {Esignature:888036441}    PHYSICIAN SECTION    Prognosis: {Prognosis:9346841129}    Condition at Discharge: [address] Patient Condition:048537857}    Rehab Potential (if transferring to Rehab): {Prognosis:0477473417}    Recommended Labs or Other Treatments After Discharge: ***    Physician Certification: I certify the above information and transfer of Dannie Cleary  is necessary for the continuing treatment of the diagnosis listed and that he requires {Admit to Appropriate Level of Care:91104} for {GREATER/LESS:134934305} 30 days.      Update Admission H&P: {CHP DME Changes in KROKQ:572813009}    PHYSICIAN SIGNATURE:  {Esignature:527876122}

## 2020-03-06 NOTE — PROGRESS NOTES
[facility]  Oncology Hematology Care  Progress Note      SUBJECTIVE:   Pt is doing much better overall  He is having less fevers  HE has no positive cultures  IM not sure if he had a bad febrile reaction to [place]? Maybe?    COunts pending this am       ROS: no sweats, no nausea, vomiting, diarrhea  shortness of breath chest pain or other pain  OBJECTIVE      Medications    Current Facility-Administered Medications: vancomycin (VANCOCIN) 1250 mg in dextrose 5 % 250 mL IVPB, 1,250 mg, Intravenous, Q16H  glucose (GLUTOSE) 40 % oral gel 15 g, 15 g, Oral, PRN  dextrose 50 % IV solution, 12.5 g, Intravenous, PRN  glucagon (rDNA) injection 1 mg, 1 mg, Intramuscular, PRN  dextrose 5 % solution, 100 mL/hr, Intravenous, PRN  sodium chloride flush 0.9 % injection 10 mL, 10 mL, Intravenous, 2 times per day  sodium chloride flush 0.9 % injection 10 mL, 10 mL, Intravenous, PRN  acetaminophen (TYLENOL) tablet 650 mg, 650 mg, Oral, Q6H PRN **OR** acetaminophen (TYLENOL) suppository 650 mg, 650 mg, Rectal, Q6H PRN  0.9 % sodium chloride infusion, , Intravenous, Continuous  polyethylene glycol (GLYCOLAX) packet 17 g, 17 g, Oral, Daily PRN  sennosides-docusate sodium (SENOKOT-S) 8.6-50 MG tablet 2 tablet, 2 tablet, Oral, Daily PRN  ondansetron (ZOFRAN) injection 4 mg, 4 mg, Intravenous, Q6H PRN  acetaminophen (TYLENOL) tablet 650 mg, 650 mg, Oral, Once  aspirin chewable tablet 81 mg, 81 mg, Oral, Daily  cetirizine (ZYRTEC) tablet 10 mg, 10 mg, Oral, Daily  rosuvastatin (CRESTOR) tablet 40 mg, 40 mg, Oral, QPM  insulin lispro (HUMALOG) injection vial 0-6 Units, 0-6 Units, Subcutaneous, TID WC  insulin lispro (HUMALOG) injection vial 0-3 Units, 0-3 Units, Subcutaneous, Nightly  famotidine (PEPCID) injection 20 mg, 20 mg, Intravenous, Daily  vancomycin (VANCOCIN) intermittent dosing (placeholder), , Other, RX Placeholder  acetaminophen (OFIRMEV) infusion 1,000 mg, 1,000 mg, Intravenous, Q6H PRN  piperacillin-tazobactam (ZOSYN) 3.375 g in dextrose 5 % 100 mL IVPB extended infusion (mini-bag), 3.375 g, Intravenous, Q8H  Facility-Administered Medications Ordered in Other Encounters: 0.9 % sodium chloride bolus, 250 mL, Intravenous, PRN  heparin flush 100 UNIT/ML injection 500 Units, 500 Units, Intercatheter, PRN  sodium chloride (PF) 0.9 % injection 20 mL, 20 mL, Intravenous, PRN  Physical    VITALS:  /62   Pulse 67   Temp 97.7 °F (36.5 °C) (Oral)   Resp 20   Ht 5' 4\" (1.626 m)   Wt 186 lb 4.6 oz (84.5 kg)   SpO2 97%   BMI 31.98 kg/m²   TEMPERATURE:  Current - Temp: 97.7 °F (36.5 °C); Max - Temp  Av.3 °F (36.8 °C)  Min: 97.7 °F (36.5 °C)  Max: 98.7 °F (37.1 °C)  PULSE OXIMETRY RANGE: SpO2  Av.3 %  Min: 94 %  Max: 97 %  24HR INTAKE/OUTPUT:      Intake/Output Summary (Last 24 hours) at 3/6/2020 0757  Last data filed at 3/5/2020 1825  Gross per 24 hour   Intake 720 ml   Output 200 ml   Net 520 ml       CONSTITUTIONAL:  awake, alert, cooperative, no apparent distress, HEENT oral pharynx , no scleral icterus  HEMATOLOGIC/LYMPHATICS:  He has baseline shotty cervical nodes   LUNGS:  No increased work of breathing, good air exchange, clear to auscultation bilaterally, no crackles or wheezing  CARDIOVASCULAR:  , regular rate and rhythm, normal S1 and S2, no S3 or S4, and no murmur noted  ABDOMEN:  No scars, normal bowel sounds, soft, non-distended, non-tender, no masses palpated, no hepatosplenomegally  MUSCULOSKELETAL:  There is no redness, warmth, or swelling of the joints. EXTREMETIES: No clubbing cynosis or edema  NEUROLOGIC:  Awake, alert, oriented to name, place and time. Cranial nerves II-XII are grossly intact.      SKIN:  no bruising or bleeding      Data      Recent Labs     20  0445 20  0850 20  0615   WBC 1.7* 4.7 2.9*   HGB 11.2* 11.6* 9.9*   HCT 31.9* 33.1* 27.9*   PLT 38* 28* 23*   MCV 88.2 87.2 87.7        Recent Labs     20  0445 20  1535     --    K 3.5  --    *  --    CO2 21 --    BUN 20  --    CREATININE 0.9 0.9     Recent Labs     03/04/20  0445   AST 57*   ALT 21   BILITOT 0.7   ALKPHOS 46       Magnesium:  No results found for: MG    Imaging Xr Chest Standard (2 Vw)    Result Date: 3/3/2020  EXAMINATION: TWO XRAY VIEWS OF THE CHEST 3/3/2020 5:06 am COMPARISON: Chest radiograph 12/09/2019; CT chest 01/23/2020 HISTORY: ORDERING SYSTEM PROVIDED HISTORY: sob TECHNOLOGIST PROVIDED HISTORY: Reason for exam:->sob Reason for Exam: sob FINDINGS: Lungs are mildly hypoexpanded, contributing to bronchovascular crowding and basilar atelectasis. No pneumothorax or pleural effusion. Normal heart size and mediastinal contours. Left chest port loops over the left IJ central venous catheter via for terminating over the SVC. Left axillary surgical clips. Shallow inspiratory effort with basilar atelectasis. Ct Head Wo Contrast    Result Date: 3/3/2020  EXAMINATION: CT OF THE HEAD WITHOUT CONTRAST  3/3/2020 5:08 am TECHNIQUE: CT of the head was performed without the administration of intravenous contrast. Dose modulation, iterative reconstruction, and/or weight based adjustment of the mA/kV was utilized to reduce the radiation dose to as low as reasonably achievable. COMPARISON: CT head 12/24/2018 HISTORY: ORDERING SYSTEM PROVIDED HISTORY: ams TECHNOLOGIST PROVIDED HISTORY: Reason for exam:->ams Has a \"code stroke\" or \"stroke alert\" been called? ->No Reason for Exam: ams FINDINGS: BRAIN/VENTRICLES: There is no acute intracranial hemorrhage, mass effect or midline shift. No abnormal extra-axial fluid collection. The gray-white differentiation is maintained without evidence of an acute infarct. There is no evidence of hydrocephalus. No substantial change in subcortical low attenuation in the posterior left frontal lobe. Stable brain volume, sulci and ventricles. ORBITS: Bilateral phthisis bulbi.  SINUSES: The visualized paranasal sinuses and mastoid air cells demonstrate no acute abnormality. SOFT TISSUES/SKULL:  No acute abnormality of the visualized skull or soft tissues. No acute intracranial abnormality. Problem List  Patient Active Problem List   Diagnosis    [facility] lymphocytic leukemia ([facility]    Hypogammaglobulinemia (HCC)    CLL (chronic lymphoid leukemia) in relapse ([facility]    Congenital blindness    Encounter for long-term (current) use of high-risk medication    Chronic lymphocytic leukemia of B-cell type in relapse ([facility]    High risk medication use    Fever of unknown origin    DMII (diabetes mellitus, type 2) ([facility]    Hypertension    Hyperlipidemia    Pneumonia due to organism    CAP (community acquired pneumonia)    Fever and chills    Status post chemotherapy    CLL (chronic lymphocytic leukemia) (HCC)       ASSESSMENT AND PLAN    Febrile illness/encephalopathy from infection/early sepsis  HE is stable -he is not worsening   HE had delerium from fevers-thus encephalopathy likely from this -encephalopathy resolved  NO organism id on any cultures as the cause at this time \  May be able to dc today if ID agrees        CLL relapsed multiple lines on chemo   He has potential for atypical infections      Blood /urine cultures so far neg  Chest xray done -negative   Cbc pendign today   ID Consult on board-  LP cancelled   Fungal cultures pending but aspergillis/cmv/ebv blood cultures negative   Tylenol prn fever-oral or iv   Start vanc/zosyn-he is high risk for sepsis given dm /cancer chemo   Pharmacy to dose vanc  ? Maybe dc today ?       COnfusion is a common issue with him with febrile illnesses-very consistent with past infections/fevers ]  Not worse and resolved     IVIG given       DM-hold oral hypoglocyemics-ssi low dose sugar checks qid   Sugars are not horrible-moniter -nothin     Increased creat-may ne related to sepsis/dehydration   NO nsaids   This is improved with fluids  No other action    \  Chemo pancytopenia  granix prn=if anc low anc is over

## 2020-03-17 NOTE — DISCHARGE SUMMARY
mucosa normal, no drainage    or sinus tenderness   Throat:   Lips, mucosa, and tongue normal; teeth and gums normal   Neck:   Supple, symmetrical, trachea midline, no adenopathy;        thyroid:  No enlargement/tenderness/nodules; no carotid    bruit or JVD   Back:     Symmetric, no curvature, ROM normal, no CVA tenderness   Lungs:     Clear to auscultation bilaterally, respirations unlabored   Chest wall:    No tenderness or deformity   Heart:    Regular rate and rhythm, S1 and S2 normal, no murmur, rub   or gallop   Abdomen:     Soft, non-tender, bowel sounds active all four quadrants,     no masses, no organomegaly           Extremities:   Extremities normal, atraumatic, no cyanosis or edema   Pulses:   2+ and symmetric all extremities   Skin:   Skin color, texture, turgor normal, no rashes or lesions   Lymph nodes:   Cervical, supraclavicular, and axillary nodes normal   Neurologic:   CNII-XII intact.  Normal strength, sensation and reflexes       throughout       Disposition: home    Patient Instructions:   [unfilled]  Activity: activity as tolerated  Diet: diabetic diet  Wound Care: none needed    Follow-up with Dr Lieutenant Wills next week  in    Signed:  Ruth Ann Juares  3/17/2020  2:53 PM

## 2020-03-20 NOTE — PROGRESS NOTES
Portogram ordered today from Dr. George Castaneda. Per Dr. Vanesa Amado this \"catheter position was curled in the left Internal jugular vein. The tip lies against the lateral wall of the SVC resulting in the inability to aspirate. \"    Dr. George Castaneda requested this be fixed today but the patient had not been NPO nor had a ride home today. This was relayed to Tigist Falk in [facility]. Tigist Falk stated that Dr. George Castaneda would like the port removed and replaced in 2 weeks instead. Once the order is received the IR department will schedule with the patient.      Electronically signed by Steven Soria RN on 3/20/2020 at 2:01 PM

## 2020-03-23 PROBLEM — G93.40 ENCEPHALOPATHY: Status: ACTIVE | Noted: 2020-01-01

## 2020-03-24 NOTE — PROGRESS NOTES
4 Eyes Admission Assessment     I agree as the admission nurse that 2 RN's have performed a thorough Head to Toe Skin Assessment on the patient. ALL assessment sites listed below have been assessed on admission. Areas assessed by both nurses:   [x]   Head, Face, and Ears   [x]   Shoulders, Back, and Chest  [x]   Arms, Elbows, and Hands   [x]   Coccyx, Sacrum, and Ischum  [x]   Legs, Feet, and Heels        Does the Patient have Skin Breakdown?   No         Jimi Prevention initiated:  Yes   Wound Care Orders initiated:  NA      [facility] nurse consulted for Pressure Injury (Stage 3,4, Unstageable, DTI, NWPT, and Complex wounds):  NA      Nurse 1 eSignature: Electronically signed by Shyla Valladares RN on 3/24/20 at 3:26 AM EDT    **SHARE this note so that the co-signing nurse is able to place an eSignature**    Nurse 2 eSignature: Electronically signed by Corey Hua RN on 3/24/20 at 3:28 AM EDT

## 2020-03-24 NOTE — ED PROVIDER NOTES
[facility]        Pt Name: Basia Medina  MRN: [id number]  [place] 1945  Date of evaluation: 3/23/2020  Provider: ESTHER Chandler - PRATIK  PCP: Rodrigo Hurd MD     I have seen and evaluated this patient with my supervising physician Holly Hazel MD.    [facility]       Chief Complaint   Patient presents with    Cough    Fever       HISTORY OF PRESENT ILLNESS   (Location, Timing/Onset, Context/Setting, Quality, Duration, Modifying Factors, Severity, Associated Signs and Symptoms)  Note limiting factors. Basia Medina is a 76 y.o. male with PMH significant for HLD, HTN, DM, blindness, and CLL who presents to the emergency department today complaining of cough and fever. This is per family. Family is concerned he is getting sick because he has been more lethargic and is neutropenic. Patient is alert and oriented to person place and year. He denies any complaints including cough or fever. Patient was admitted earlier this month by his oncologist, Dr. Aury Dickson, after becoming confused and febrile at home after starting new treatment with leukeran and gadzyva. Nursing Notes were all reviewed and agreed with or any disagreements were addressed in the HPI. REVIEW OF SYSTEMS    (2-9 systems for level 4, 10 or more for level 5)     Review of Systems   Constitutional: Positive for fatigue and fever. Negative for chills and diaphoresis. HENT: Negative for congestion, ear pain, facial swelling, sinus pressure, sinus pain and sore throat. Eyes: Positive for visual disturbance (blind). Respiratory: Positive for cough. Negative for shortness of breath. Cardiovascular: Negative for chest pain. Gastrointestinal: Negative for abdominal pain, diarrhea, nausea and vomiting. Genitourinary: Negative for dysuria, flank pain and hematuria. Musculoskeletal: Negative.     Skin: Negative for color change, (CRESTOR) 40 MG tablet Take 40 mg by mouth every eveningHistorical Med      lisinopril (PRINIVIL;ZESTRIL) 20 MG tablet Take 20 mg by mouth daily Historical Med      MULTIPLE VITAMIN PO Take  by mouth. Take 1 vitamin a day. Senior Vitamin      desloratadine (CLARINEX) 5 MG tablet Take 5 mg by mouth daily. fenofibrate (TRICOR) 145 MG tablet Take 145 mg by mouth daily. ALLERGIES     Patient has no known allergies. FAMILYHISTORY       Family History   Problem Relation Age of Onset    Breast Cancer Sister     Coronary Art Dis Mother     Diabetes Mother     Elevated Lipids Mother     Hypertension Mother     Obesity Mother     Obesity Brother           SOCIAL HISTORY       Social History     Tobacco Use    Smoking status: Never Smoker    Smokeless tobacco: Never Used   Substance Use Topics    Alcohol use: Yes     Comment: rare use    Drug use: No       SCREENINGS    Marshall Coma Scale  Eye Opening: Spontaneous  Best Verbal Response: Oriented  Best Motor Response: Obeys commands  Marshall Coma Scale Score: 15        PHYSICAL EXAM    (up to 7 for level 4, 8 or more for level 5)     ED Triage Vitals   BP Temp Temp Source Pulse Resp SpO2 Height Weight   03/23/20 2023 03/23/20 2006 03/23/20 2006 03/23/20 2023 03/23/20 2023 03/23/20 2023 03/23/20 2023 03/23/20 2023   129/63 98.9 °F (37.2 °C) Oral 77 19 100 % 5' 3\" (1.6 m) 170 lb 6.7 oz (77.3 kg)       Physical Exam  Vitals signs and nursing note reviewed. Constitutional:       General: He is not in acute distress. Appearance: Normal appearance. He is well-developed. He is not toxic-appearing. HENT:      Head: Normocephalic and atraumatic. Right Ear: Tympanic membrane and ear canal normal.      Left Ear: Tympanic membrane and ear canal normal.   Eyes:      Comments: blind   Neck:      Musculoskeletal: Full passive range of motion without pain and neck supple. No neck rigidity. Vascular: No JVD.    Cardiovascular:      Rate and EKG.      RADIOLOGY:   Non-plain film images such as CT, Ultrasound and MRI are read by the radiologist. Plain radiographic images are visualized and preliminarily interpreted by the ED Provider with the below findings:        Interpretation per the Radiologist below, if available at the time of this note:    MRI BRAIN W WO CONTRAST   Final Result   1. No acute intracranial abnormality. 2. Mild parenchymal volume loss. Minimal chronic microvascular ischemic   changes. 3. Small area of encephalomalacia in the high left frontal lobe compatible   with prior infarction or injury. CT Head WO Contrast   Final Result   No acute intracranial abnormality. CT CHEST WO CONTRAST   Final Result   Mild left lower lobe atelectasis, slightly increased since the prior study. Otherwise stable findings including bulky mediastinal, left axillary and   upper abdominal lymphadenopathy. XR CHEST PORTABLE   Final Result   Stable portable study. Ct Soft Tissue Neck W Contrast    Result Date: 3/23/2020  EXAMINATION: CT OF THE NECK SOFT TISSUE WITH CONTRAST  3/23/2020 TECHNIQUE: CT of the neck was performed with the administration of intravenous contrast. Multiplanar reformatted images are provided for review. Dose modulation, iterative reconstruction, and/or weight based adjustment of the mA/kV was utilized to reduce the radiation dose to as low as reasonably achievable. COMPARISON: CT cervical spine 12/24/2018. HISTORY: ORDERING SYSTEM PROVIDED HISTORY: Chronic lymphocytic leukemia of B-cell type in relapse [facility]) TECHNOLOGIST PROVIDED HISTORY: Reason for Exam: follow up CLL, neck swelling, Acuity: Acute Type of Exam: Subsequent/Follow-up Relevant Medical/Surgical History: appy FINDINGS: PHARYNX/LARYNX:  Streak artifact from dental amalgam partially limits evaluation of the oral cavity and oropharynx. The visualized upper aerodigestive tract appears grossly symmetric.   No discrete mass clearly and fever. Patient's family reports that pt \"has had a cough for a week\" and that a \"fever started today\". Pt is from home and is currently receiving treatment for cancer. Pt visited his oncologist today, and got back home \"around 1600\". Patient's family reports that pt was not as alert as usual and \"more lethargic\". Pt did not receive chemotherapy earlier today due to having a \"low platelet and white blood cell count\". FINDINGS: Mediastinum: Mediastinal left axillary and left subpectoral lymphadenopathy is unchanged since the study performed 10 hours ago. Conglomerate subcarinal lymphadenopathy measures up to 6.9 x 3.0 cm, not changed measured at the same level. The heart size is stable. Coronary arterial calcifications are present. The central pulmonary arteries and aorta are within normal limits in caliber and course. Lungs/pleura: The central airways are patent. Mild linear left basilar opacity suggests atelectasis, slightly increased since the prior study. No consolidation or effusion. Upper Abdomen: Limited noncontrast images of the upper abdomen show no acute abnormality. Small hiatal hernia. Bulky upper abdominal lymphadenopathy is identified including gastrohepatic, periceliac and carlos eduardo hepatic enlarged lymph nodes. Again identified are enlarged left axillary and left subpectoral lymph nodes, unchanged. Soft Tissues/Bones: No acute or focal suspicious bony abnormality. Mild left lower lobe atelectasis, slightly increased since the prior study. Otherwise stable findings including bulky mediastinal, left axillary and upper abdominal lymphadenopathy.      Xr Chest Portable    Result Date: 3/23/2020  EXAMINATION: ONE XRAY VIEW OF THE CHEST 3/23/2020 8:45 pm COMPARISON: Prior studies including chest CT today chest x-ray December 2016 HISTORY: ORDERING SYSTEM PROVIDED HISTORY: SOB TECHNOLOGIST PROVIDED HISTORY: Reason for exam:->SOB Reason for Exam: cough, fever Acuity: Acute Type of Exam: Initial mesenteric vasculature. Pelvis: Urinary bladder and prostate gland are unremarkable. Peritoneum/Retroperitoneum: There is extensive upper abdominal, retroperitoneal, mesenteric and iliofemoral adenopathy. Sample measurements of indexed nodes are provided below- Gastrohepatic 57 x 26 mm (previously 46 x 38 mm), Left common iliac 31 x 36 mm (previously 25 x 26 mm), Left internal iliac 65 x 64 mm (previously 46 x 58 mm), Right iliofemoral 54 x 45 mm (previously 45 x 43 mm, and Left iliofemoral 68 x 50 mm (previously 47 x 38 mm), Bones/Soft Tissues: No lytic or blastic bone lesions are identified. Fat containing left inguinal hernia. Multi compartmental adenopathy, most prominent in the iliofemoral regions, with progression above and below the diaphragm, since 01/23/2020. PROCEDURES   Unless otherwise noted below, none     Procedures    CRITICAL CARE TIME   N/A    CONSULTS:  None      EMERGENCY DEPARTMENT COURSE and DIFFERENTIAL DIAGNOSIS/MDM:   Vitals:    Vitals:    03/24/20 0934 03/24/20 2020 03/25/20 0818 03/25/20 1151   BP: (!) 147/76 136/67 (!) 143/73 (!) 107/57   Pulse: 73 87 65 72   Resp: 16 16 16 18   Temp: 97.9 °F (36.6 °C) 98.3 °F (36.8 °C) 97.9 °F (36.6 °C) 98.2 °F (36.8 °C)   TempSrc: Axillary Oral Oral Oral   SpO2: 97% 97% 96% 95%   Weight:       Height:           Patient was given the following medications:  Medications   0.9 % sodium chloride bolus (0 mLs Intravenous Stopped 3/24/20 0112)   gadobenate dimeglumine (MULTIHANCE) injection 15 mL (15 mLs Intravenous Given 3/24/20 5417)       Differential Diagnosis: Acute Coronary Syndrome, Congestive Heart Failure, Myocardial Infarction, Pulmonary Embolus, Pneumonia, Pneumothorax, other    Patient seen and examined today for cough fever and fatigue. See HPI for patient presentation. Patient is hemodynamically stable, nontoxic, afebrile, and without tachycardia, tachypnea, and hypoxia. Physical exam as above.   76year-old lying in bed in no acute distress. He is awake alert and oriented. He is neurovascular intact without deficits. Urine shows no infection or blood. CBC shows chronic neutropenia at 1.6. Hemoglobin 10.6. Platelets 56. Lactic acid normal.  CT chest shows mild left lower lobe atelectasis with slight interval increasing. Procalcitonin level not resulted at time of this note. As this is the end of my shift the attending physician will continue care of this patient. Callie Phalen was signed out in stable condition. Please see Nic Ovalle MD note for further details, including diagnosis and disposition. FINAL IMPRESSION      1.  Encephalopathy          DISPOSITION/PLAN   DISPOSITION        PATIENT REFERREDTO:  Boston Cortez MD  [address] [facility] [address]  [facility] [address], [address] [facility]  [address]  [address] [facility]  [phone]      follow up as needed      DISCHARGE MEDICATIONS:  Discharge Medication List as of 3/25/2020 11:31 AM          DISCONTINUED MEDICATIONS:  Discharge Medication List as of 3/25/2020 11:31 AM                 (Please note that portions of this note were completed with a voice recognition program.  Efforts were made to edit the dictations but occasionally words are mis-transcribed.)    ESTHER Bagley CNP (electronically signed)            ESTHER Bagley CNP  03/30/20 3922

## 2020-03-24 NOTE — PROGRESS NOTES
Patient admitted to room 3127 from ER via stretcher. Oriented to room, call light, and floor policies. Plan of care reviewed with patient. Pt is resting in bed and not c/o pain at this time; no s/s of distress noted. Assessment completed; VSS. Pt rates a high fall risk; bed alarm on. Safety precautions in place; call light and bedside table within reach. Pt encouraged to call for needs or ambulation. Pt VU. Will continue to monitor.

## 2020-03-24 NOTE — H&P
PRN  0.9 % sodium chloride infusion, , Intravenous, Continuous  ondansetron (ZOFRAN) injection 4 mg, 4 mg, Intravenous, Q6H PRN  famotidine (PEPCID) tablet 20 mg, 20 mg, Oral, BID  polyethylene glycol (GLYCOLAX) packet 17 g, 17 g, Oral, Daily PRN  lisinopril (PRINIVIL;ZESTRIL) tablet 20 mg, 20 mg, Oral, Daily  insulin lispro (HUMALOG) injection vial 0-6 Units, 0-6 Units, Subcutaneous, TID WC  insulin lispro (HUMALOG) injection vial 0-3 Units, 0-3 Units, Subcutaneous, Nightly  glucose (GLUTOSE) 40 % oral gel 15 g, 15 g, Oral, PRN  dextrose 50 % IV solution, 12.5 g, Intravenous, PRN  glucagon (rDNA) injection 1 mg, 1 mg, Intramuscular, PRN  dextrose 5 % solution, 100 mL/hr, Intravenous, PRN  Facility-Administered Medications Ordered in Other Encounters: 0.9 % sodium chloride bolus, 250 mL, Intravenous, PRN  heparin flush 100 UNIT/ML injection 500 Units, 500 Units, Intercatheter, PRN  sodium chloride (PF) 0.9 % injection 20 mL, 20 mL, Intravenous, PRN  Allergies:  Patient has no known allergies.     Social History:      Social History     Socioeconomic History    Marital status:      Spouse name: Not on file    Number of children: Not on file    Years of education: Not on file    Highest education level: Not on file   Occupational History    Not on file   Social Needs    Financial resource strain: Not on file    Food insecurity     Worry: Not on file     Inability: Not on file    Transportation needs     Medical: Not on file     Non-medical: Not on file   Tobacco Use    Smoking status: Never Smoker    Smokeless tobacco: Never Used   Substance and Sexual Activity    Alcohol use: Yes     Comment: rare use    Drug use: No    Sexual activity: Not on file   Lifestyle    Physical activity     Days per week: Not on file     Minutes per session: Not on file    Stress: Not on file   Relationships    Social connections     Talks on phone: Not on file     Gets together: Not on file     Attends Voodoo service: Not on file     Active member of club or organization: Not on file     Attends meetings of clubs or organizations: Not on file     Relationship status: Not on file    Intimate partner violence     Fear of current or ex partner: Not on file     Emotionally abused: Not on file     Physically abused: Not on file     Forced sexual activity: Not on file   Other Topics Concern    Not on file   Social History Narrative    Not on file          Family History:         Problem Relation Age of Onset    Breast Cancer Sister     Coronary Art Dis Mother     Diabetes Mother     Elevated Lipids Mother     Hypertension Mother     Obesity Mother     Obesity Brother      REVIEW OF SYSTEMS:    ROS per the HPI other wise 10 point ROS negative   PHYSICAL EXAM:      Vitals:  /69   Pulse 66   Temp 97.8 °F (36.6 °C) (Axillary)   Resp 20   Ht 5' 3\" (1.6 m)   Wt 165 lb 12.6 oz (75.2 kg)   SpO2 100%   BMI 29.37 kg/m²     CONSTITUTIONAL:  awake, alert, cooperative, no apparent distress, and appears stated age NAD  EYES:blind   ENT:  Normocephalic, without obvious abnormality, atramatic, sinuses nontender on palpation, external ears without lesions, oral pharynx with moist mucus membranes, tonsils without erythema or exudates, gums normal and good dentition. NECK:  Large neck nodes   HEMATOLOGIC/LYMPHATICS: + cervical/axillary adenopathy   BACK:  Symmetric, no curvature, spinous processes are non-tender on palpation, paraspinous muscles are non-tender on palpation, no costal vertebral tenderness  LUNGS:  No increased work of breathing, good air exchange, clear to auscultation bilaterally, no crackles or wheezing  CARDIOVASCULAR:  , regular rate and rhythm, normal S1 and S2, no S3 or S4, and no murmur noted  ABDOMEN:  No scars, normal bowel sounds, soft, non-distended, non-tender, no masses palpated, no hepatosplenomegally  MUSCULOSKELETAL no effusion s y.   NEUROLOGIC blind       DATA:    PT/INR:  No results found HISTORY: Encounter for care related to Port-a-Cath TECHNOLOGIST PROVIDED HISTORY: Reason for Exam: portagram Acuity: Acute Type of Exam: Initial FLUOROSCOPY DOSE AND TYPE OR TIME AND EXPOSURES: 1 minute of fluoroscopy time was utilized. 4 spot films were obtained and stored in PACs DESCRIPTION OF PROCEDURE: Informed consent was obtained after a detailed explanation of the procedure including risks, benefits, and alternatives. Universal protocol was observed. Patient was placed on the interventional radiology fluoroscopy table in a supine position. The left subclavian venous access port was accessed with the Hubner needle by the interventional radiologist.  Blood could not be aspirated. However, sterile saline could be forcefully injected. Fluoroscopic evaluation of the catheter shows that the midportion of the catheter is curled superiorly into the left internal jugular vein. The tip of the catheter lies perpendicular to the wall of the SVC. Injection of water-soluble contrast confirms an intact port and catheter. Injection of contrast demonstrates antegrade flow from the tip of the catheter into the SVC. There is no evidence of fibrin sheath. FINDINGS: Suboptimal positioning of the catheter which is curled in the left internal jugular vein. The tip of the catheter lies against the lateral wall of the SVC resulting in the inability to aspirate. Portogram as described. Port revision should be considered for optimal utilization. Ct Chest Abdomen Pelvis W Contrast    Result Date: 3/23/2020  EXAMINATION: CT OF THE CHEST, ABDOMEN, AND PELVIS WITH CONTRAST 3/23/2020 10:54 am TECHNIQUE: CT of the chest, abdomen and pelvis was performed with the administration of intravenous contrast. Multiplanar reformatted images are provided for review. Dose modulation, iterative reconstruction, and/or weight based adjustment of the mA/kV was utilized to reduce the radiation dose to as low as reasonably achievable. COMPARISON: CT chest, 01/23/2020 HISTORY: ORDERING SYSTEM PROVIDED HISTORY: Chronic lymphocytic leukemia of B-cell type in relapse [facility]) TECHNOLOGIST PROVIDED HISTORY: Additional Contrast?->Oral Reason for Exam: follow up CLL, Acuity: Acute Type of Exam: Subsequent/Follow-up Relevant Medical/Surgical History: appy FINDINGS: Chest: Mediastinum: Adenopathy is again noted in the lower neck and mediastinum, with mild progression. A subcarinal node is 26 x 23 mm (previously 19 x 20 mm). There is likely is mild progression and bilateral hilar adenopathy. Lungs/pleura: No pulmonary nodules are identified. There are no pleural effusions. Soft Tissues/Bones: No lytic or blastic bone lesions are appreciated. There is bilateral axillary adenopathy. Indexed conglomerate left axillary nodes are 36 x 26 mm (previously 33 x 23 mm). Left axillary nodes are increased in size. Abdomen/Pelvis: Organs: The liver, gallbladder, pancreas and adrenal glands are unremarkable. The spleen measures 12.4 cm longitudinally (previously 11.7 cm). GI/Bowel: The stomach and small bowel are unremarkable. No focal mural thickening of the colon is identified. There is normal enhancement of the mesenteric vasculature. Pelvis: Urinary bladder and prostate gland are unremarkable. Peritoneum/Retroperitoneum: There is extensive upper abdominal, retroperitoneal, mesenteric and iliofemoral adenopathy. Sample measurements of indexed nodes are provided below- Gastrohepatic 57 x 26 mm (previously 46 x 38 mm), Left common iliac 31 x 36 mm (previously 25 x 26 mm), Left internal iliac 65 x 64 mm (previously 46 x 58 mm), Right iliofemoral 54 x 45 mm (previously 45 x 43 mm, and Left iliofemoral 68 x 50 mm (previously 47 x 38 mm), Bones/Soft Tissues: No lytic or blastic bone lesions are identified. Fat containing left inguinal hernia.      Multi compartmental adenopathy, most prominent in the iliofemoral regions, with progression above and below the diaphragm, since 01/23/2020. Problem List  Patient Active Problem List   Diagnosis    Chronic lymphocytic leukemia ([facility]    Hypogammaglobulinemia (HCC)    CLL (chronic lymphoid leukemia) in relapse ([facility]    Congenital blindness    Encounter for long-term (current) use of high-risk medication    Chronic lymphocytic leukemia of B-cell type in relapse ([facility]    High risk medication use    Fever of unknown origin    DMII (diabetes mellitus, type 2) ([facility]    Hypertension    Hyperlipidemia    Pneumonia due to organism    CAP (community acquired pneumonia)    Status post chemotherapy    CLL (chronic lymphocytic leukemia) (HCC)    Encephalopathy       IMPRESSION/PLAN    Mental status changes  Cultures sent-last time he had this happen and was febrile-an extensive workup was negative  I had suspected drug fever last admit-yesterday he had not gotten any chemo  He had ct which then led to have him to see rad onc for assitance in palliative xrt  He has disease progression with limited chemo options  I iwll check an mri to see if any cns findings however this disease rarely goes to cns unless he has padron transformation     DM-ssi an poc glucose   Hold home meds    ?  Consider palliative care consult to assist as I think we will need to start planning end of life care     Lui Barkley MD

## 2020-03-25 PROBLEM — G93.40 ENCEPHALOPATHY: Status: RESOLVED | Noted: 2020-01-01 | Resolved: 2020-01-01

## 2020-05-05 NOTE — PRE SEDATION
Sedation Pre-Procedure Note    Patient Name: Su Camacho   YOB: 1945  Room/Bed: Room/bed info not found  Medical Record Number: [id number]  Date: 5/5/2020   Time: 10:41 AM       Indication:  Port insertion, port removal    Consent: I have discussed with the patient and/or the patient representative the indication, alternatives, and the possible risks and/or complications of the planned procedure and the anesthesia methods. The patient and/or patient representative appear to understand and agree to proceed. Vital Signs:   Vitals:    05/05/20 0809   BP: 121/76   Pulse: 93   Resp: 14   Temp: 97.4 °F (36.3 °C)   SpO2: 99%       Past Medical History:   has a past medical history of Anxiety, Blind, Blood disorder, Cancer ([facility] Diabetes mellitus ([facility] Encounter for long-term (current) use of high-risk medication, Glaucoma, Hyperlipidemia, and Hypertension. Past Surgical History:   has a past surgical history that includes pre-malignant / benign skin lesion excision; Tunneled venous port placement (Left, 2006); Hand surgery (Left, 10/16/14); lymph node dissection (2008); fine needle aspiration (2006); Tunneled venous port placement (2006); and Appendectomy. Medications:   Scheduled Meds:    ceFAZolin  2 g Intravenous Once     Continuous Infusions:   PRN Meds:   Home Meds:   Prior to Admission medications    Medication Sig Start Date End Date Taking?  Authorizing Provider   glimepiride (AMARYL) 1 MG tablet Take 1 mg by mouth Daily with supper    Yes Historical Provider, MD   Insulin Degludec (TRESIBA FLEXTOUCH) 100 UNIT/ML SOPN Inject 22-30 Units into the skin daily    Yes Historical Provider, MD   venlafaxine (EFFEXOR) 37.5 MG tablet Take 37.5 mg by mouth daily    Yes Historical Provider, MD   Liraglutide (VICTOZA) 18 MG/3ML SOPN SC injection Inject 0.6 mg into the skin daily   Yes Historical Provider, MD   lisinopril (PRINIVIL;ZESTRIL) 20 MG tablet Take 20 mg by mouth daily    Yes Historical Provider, MD   MULTIPLE VITAMIN PO Take  by mouth. Take 1 vitamin a day. Senior Vitamin   Yes Historical Provider, MD   desloratadine (CLARINEX) 5 MG tablet Take 5 mg by mouth daily. Yes Historical Provider, MD   fenofibrate (TRICOR) 145 MG tablet Take 145 mg by mouth daily. Yes Historical Provider, MD   acetaminophen (APAP EXTRA STRENGTH) 500 MG tablet Take 1 tablet by mouth every 6 hours as needed for Pain or Fever 5/27/19   Oza Aschoff, PA   rosuvastatin (CRESTOR) 40 MG tablet Take 40 mg by mouth every evening    Historical Provider, MD     Coumadin Use Last 7 Days:  no  Antiplatelet drug therapy use last 7 days: no  Other anticoagulant use last 7 days: no  Additional Medication Information:        Pre-Sedation Documentation and Exam:   I have reviewed the patient's history and review of systems.     Mallampati Airway Assessment:  Mallampati Class II - (soft palate, fauces & uvula are visible)    Prior History of Anesthesia Complications:   none    ASA Classification:  Class 2 - A normal healthy patient with mild systemic disease    Sedation/ Anesthesia Plan:   intravenous sedation    Medications Planned:   midazolam (Versed) intravenously and fentanyl intravenously    Patient is an appropriate candidate for plan of sedation: yes    Electronically signed by Harinder Pollack MD on 5/5/2020 at 10:41 AM

## 2020-10-06 NOTE — PROGRESS NOTES
[facility]      Patient Name: Terrance Lemus Record Number:  <[id number]  Primary Care Physician:  Shona Adams MD  Date of Consultation: 10/6/2020     Chief Complaint:   Chief Complaint   Patient presents with    Ear Problem     mass on left ear    Other     lymph nodes, on chest and neck on left side         HISTORY OF PRESENT ILLNESS  Patel Serrano is a(n) 76 y.o. male who presents today for evaluation of multiple lesions of the head and neck. The patient is accompanied by his wife who provides part of the history. Patient first notes that he is had a left ear lesion which is been present for at least a year. It is slowly been growing in size. It sounds like it recently was determined to start undergoing rapid enlargement. Additionally he has multiple other lesions of the scalp which are being followed by dermatologist and which have been frozen and treated in the past.  He also states that he underwent a Mohs procedure over the summer for a large anterior chest wall mass. This was reportedly a mixture of basal and squamous cell. It was reportedly completely excised. It is now returned. He returned to his Mohs surgeon several months ago for procedure to be performed on his left ear when there was concern that it enlarged and the determination was made that a biopsy need to be performed. Per the patient although I do not have these records available to me there is a diagnosis of squamous cell cancer. The patient has a very complicated history given that he has had CLL for many years. He has received chemotherapy as well as targeted radiation to the neck for lymphadenopathy in the past.    I independently reviewed his CT chest from 3/20/2020. It shows evidence of stable findings of bulky mediastinal left axillary and upper abdominal lymphadenopathy.   I do not appreciate the chest lesion      Patient Active Problem List   Diagnosis    rhinorrhea  RESPIRATORY: no  Difficulty breathing, no shortness of breath  CV: no chest pain, no Peripheral vascular disease  HEME: No coagulation disorder, no Bleeding disorder  NEURO: no TIA or stroke-like symptoms  SKIN: No new rashes in the head and neck, no recent skin cancers  MOUTH: No new ulcers, no recent teeth infections  GASTROINTESTINAL: No diarrhea, stomach pain  PSYCH: No anxiety, no depression      PHYSICAL EXAM  /66 (Site: Right Upper Arm, Position: Sitting, Cuff Size: Large Adult)   Pulse 82   Temp 97.2 °F (36.2 °C) (Temporal)     GENERAL: No Acute Distress, Alert and Oriented, no Hoarseness, strong voice  EYES: EOMI, Anti-icteric  HENT:   Head: Normocephalic and atraumatic. Face:  Symmetric, facial nerve intact, no sinus tenderness  Right Ear: Normal external ear, normal external auditory canal, intact tympanic membrane with normal mobility and aerated middle ear  Left Ear: There is a large 7 cm exophytic lesion of the left auricle extending posteriorly to the sulcus and anteriorly to the conchal bowl. There is no obvious erosion of the external auditory canal.  Mouth/Oral Cavity:  normal lips, Uvula is midline, no mucosal lesions, no trismus, fair dentition, normal salivary quality/flow  Oropharynx/Larynx:  normal oropharynx, normal tonsils; normal larynx/nasopharynx on mirror exam  Nose:Normal external nasal appearance. Anterior rhinoscopy shows a line septum. Normal turbinates. Normal mucosa   NECK: Normal range of motion, no thyromegaly, trachea is midline, no lymphadenopathy, no neck masses, no crepitus  CHEST: Normal respiratory effort, no retractions, breathing comfortably  SKIN: No rashes, normal appearing skin, no evidence of skin lesions/tumors  Neuro:  cranial nerve II-XII intact; normal gait  Cardio:  no edema    There is a large 7 cm mass located about the midline of the superior aspect of the sternum.   This is ulcerated and tender to palpation. PROCEDURE    PROCEDURE  Incisional biopsy of left ear lesion and anterior chest wall lesion - CPT 58634 and 52996    Pre op: Nasal tip lesion  Post op: Same  Procedure :   Surgeon: Tomasa Banegas MD  Anesthesia: 2% Xylocaine with [id number] epi     Description: After written consent was obtained the ear lesion and chest lesion were visualized and the surrounding area was infiltrated with 2% Xylocaine with 1 100,000 epinephrine. After allowing adequate anesthesia to take place, a scalpel biopsy was used for each site. A pair of iris scissors were utilized to make sharp cuts at the base of the lesion and a portion of the lesion was removed from both sites. Hemostasis was obtained and the would was closed if appropriate. Pertinent findings: Full biopsy of both sites. The patient tolerated the procedure well and there were no complications. 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 Ear mass, left    3. Chest mass    4. CLL (chronic lymphocytic leukemia) ([facility]    5. Neck mass      This is an extremely complicated case due to multiple patient comorbidities including his history of chronic lymphocytic leukemia, current chemotherapy and history of multiple skin cancers of the head and neck. From the history of was able to obtain from review of medical records the patient and in discussion with his treating oncologist Dr. Colten Morejon it sounds like the patient had the lesion of his ear and chest for some time and they had been stable until recently. It appears as if he has had rapid progression of the disease process of both areas. I performed a biopsy of both sites today. Given his history of squamous cell carcinoma I suspect his ear lesion is a squamous cell carcinoma. The chest lesion is more unclear. Per the patient and his wife there was a report that there was both basaloid components as well as squamous components.   Will be important to see what these pathology reports show to assist in planning for any surgery. I had an extensive discussion with the patient's oncologist Dr. Олег Aponte it sounds like he is entering the end stages of his CLL. Will be important to discuss with the patient and his treating team the goals of any proposed interventions. I do think that a palliative procedure to address the ear and chest lesions would be warranted to prevent him from significant morbidity. I have ordered a CT neck with contrast as well as a CT IAC to evaluate for extension into the external auditory canal.  Additionally I will be evaluating for any lymphadenopathy. He does have a history of bulky adenopathy of the neck is previously been treated with radiation and this could be confounding things as whether or not this is cutaneous spread from his suspected squamous cell of the ear or residual disease from his CLL. I will see him back next week to discuss his biopsy results. I have performed a head and neck physical exam personally or was physically present during the key or critical portions of the service. Medical Decision Making:   The following items were considered in medical decision making:  Independent review of images  Review / order clinical lab tests  Review / order radiology tests  Decision to obtain old records

## 2020-10-14 NOTE — PATIENT INSTRUCTIONS
This is an extremely complicated case due to multiple patient comorbidities including his history of chronic lymphocytic leukemia, current chemotherapy and history of multiple skin cancers of the head and neck. From the history I was able to obtain from review of medical records, from the patient and in discussion with his treating oncologist Dr. Colten Morejon, it sounds like the patient had the lesion of his ear and chest for some time and they had been stable until recently. It appears as if he has had rapid progression of the disease process of both areas. This is now biopsy-proven squamous cell carcinoma both sites. I have discussed potential treatment options with the patient and his wife in detail. They would like to proceed with surgical excision of the lesions. They understand that this is meant to be a palliative procedure. I did discuss that it would likely be a left total auriculectomy and my partner Dr. Yuliana Polanco would perform the resection of the chest lesion and assist with reconstruction of both areas. We also discussed that while the patient could undergo larger procedures requiring more aggressive treatment and reconstruction options, based on his quality of life and disease burden in relationship to his CLL the the less invasive approach would be preferable. I also discussed with the patient that I would like to biopsy his right ear as well as 2 lesions on either side of the central chest lesion is these are all concerning for squamous cell. This significantly complicates his management as if these were positive he would require further resection of these sites. We did discuss potentially a larger resection of the chest wall if this were the case. Regarding the ear he is dependent on his hearing aid on the right and so we would not be as aggressive with resecting the right ear should this turn out to be squamous cell as well.     I have asked the patient to obtain perioperative clearance from his primary care physician. I discussed his case in detail with my plastic surgery colleague Dr. Sandra Robertson, and we will schedule him going forward as soon as possible.

## 2020-10-14 NOTE — TELEPHONE ENCOUNTER
Sarkis Mosquera called with a question. Sergio Mendiola is going to have ear removed with skin graft. Iona Montez would like to know if it is possible to leave part of the ear. Blind people rely on their ears for balance etc.  She states that if it is not possible, they will deal with it. Could you please call her to discuss this possibility?

## 2020-10-14 NOTE — TELEPHONE ENCOUNTER
Do we need to see patient in office prior to this?      ----- Message from Cherrie Marmolejo MD sent at 10/12/2020  5:18 PM EDT -----  That sounds like fun! Thanks! Gracie Aguilera  ----- Message -----  From: Tomasa Banegas MD  Sent: 10/12/2020   1:17 PM EDT  To: Cherrie Marmolejo MD    Hey! This patient has a large ~7cm squamous cell carcinoma of the central chest overlying the sternum as well as a very large left auricular squamous cell. I was wondering if you would be interested in assisting me with this case, with a plan for a palliative resection as he is in end stage CLL per his oncologist Dr Colten Morejon. Give me a call at [phone] and we can discuss this more. Thanks!

## 2020-10-14 NOTE — PROGRESS NOTES
Evangelista      Patient Name: Terrance Lemus Record Number:  <[id number]>  Primary Care Physician:  Caryl Rodas MD  Date of Consultation: 10/14/2020     Chief Complaint:   Chief Complaint   Patient presents with    Follow-up     CT Results and re dress the lesion the [address] Elle is a(n) 76 y.o. male who presents today for evaluation of multiple lesions of the head and neck. The patient is accompanied by his wife who provides part of the history. Patient first notes that he is had a left ear lesion which is been present for at least a year. It is slowly been growing in size. It sounds like it recently was determined to start undergoing rapid enlargement. Additionally he has multiple other lesions of the scalp which are being followed by dermatologist and which have been frozen and treated in the past.  He also states that he underwent a Mohs procedure over the summer for a large anterior chest wall mass. This was reportedly a mixture of basal and squamous cell. It was reportedly completely excised. It is now returned. He returned to his Mohs surgeon several months ago for procedure to be performed on his left ear when there was concern that it enlarged and the determination was made that a biopsy need to be performed. Per the patient although I do not have these records available to me there is a diagnosis of squamous cell cancer. The patient has a very complicated history given that he has had CLL for many years. He has received chemotherapy as well as targeted radiation to the neck for lymphadenopathy in the past.    I independently reviewed his CT chest from 3/20/2020. It shows evidence of stable findings of bulky mediastinal left axillary and upper abdominal lymphadenopathy. I do not appreciate the chest lesion. Update 10/14/2020:    Patient presents today for follow-up.   I independently reviewed his CT temporal bone as well as a CT chest and neck with contrast.  It shows evidence of a large left auricular lesion without discrete invasion of the temporal bone. CT chest there is a large central mass as noted in exam this appears to come close to but not invade the sternum. There is bulky adenopathy bilaterally in the neck, but per review it appears that this is decreased and he does have a history of adenopathy of the neck related to his CLL. I reviewed the results with the patient and formed him that both biopsy sites were consistent with squamous cell carcinoma. The patient continues to have significant tenderness associated with both of his wounds. His chest wound is draining significantly and his wife finds it difficult to perform dressing changes.       Patient Active Problem List   Diagnosis    Chronic lymphocytic leukemia ([address]    Hypogammaglobulinemia (HCC)    CLL (chronic lymphoid leukemia) in relapse ([address]    Congenital blindness    Encounter for long-term (current) use of high-risk medication    Chronic lymphocytic leukemia of B-cell type in relapse ([address]    High risk medication use    Fever of unknown origin    DMII (diabetes mellitus, type 2) ([address]    Hypertension    Hyperlipidemia    Pneumonia due to organism    CAP (community acquired pneumonia)    Status post chemotherapy    CLL (chronic lymphocytic leukemia) ([address]     Past Surgical History:   Procedure Laterality Date    APPENDECTOMY      FINE NEEDLE ASPIRATION  2006    HAND SURGERY Left 10/16/14    excision left thumb lesion with skin graft/trigger finger release left thumb and 5th finger    LYMPH NODE DISSECTION  2008    PRE-MALIGNANT / BENIGN SKIN LESION EXCISION      : excision 3.4 cm right shoulder lesion and 1.7 cm incisional biopsy left thumb    TUNNELED VENOUS PORT PLACEMENT Left 2006    Removed on 5/5/2020 by Dr. Martinez Given TUNNELED VENOUS PORT PLACEMENT  2006    TUNNELED VENOUS PORT PLACEMENT UNIT/ML SOPN Inject 22-30 Units into the skin daily     Historical Provider, MD   venlafaxine (EFFEXOR) 37.5 MG tablet Take 37.5 mg by mouth daily     Historical Provider, MD   Liraglutide (VICTOZA) 18 MG/3ML SOPN SC injection Inject 0.6 mg into the skin daily    Historical Provider, MD   rosuvastatin (CRESTOR) 40 MG tablet Take 40 mg by mouth every evening    Historical Provider, MD   lisinopril (PRINIVIL;ZESTRIL) 20 MG tablet Take 20 mg by mouth daily     Historical Provider, MD   MULTIPLE VITAMIN PO Take  by mouth. Take 1 vitamin a day. Senior Vitamin    Historical Provider, MD   desloratadine (CLARINEX) 5 MG tablet Take 5 mg by mouth daily. Historical Provider, MD   fenofibrate (TRICOR) 145 MG tablet Take 145 mg by mouth daily. Historical Provider, MD       REVIEW OF SYSTEMS  The following systems were reviewed and revealed the following in addition to any already discussed in the HPI:    CONSTITUTIONAL: no weight loss, no fever, no night sweats, no chills  EYES: no vision changes, no blurry vision  EARS: no changes in hearing, no otalgia  NOSE: no epistaxis, no rhinorrhea  RESPIRATORY: no  Difficulty breathing, no shortness of breath  CV: no chest pain, no Peripheral vascular disease  HEME: No coagulation disorder, no Bleeding disorder  NEURO: no TIA or stroke-like symptoms  SKIN: No new rashes in the head and neck, no recent skin cancers  MOUTH: No new ulcers, no recent teeth infections  GASTROINTESTINAL: No diarrhea, stomach pain  PSYCH: No anxiety, no depression      PHYSICAL EXAM  /68 (Site: Right Upper Arm, Position: Sitting, Cuff Size: Large Adult)   Pulse 86   Temp 97.9 °F (36.6 °C) (Temporal)     GENERAL: No Acute Distress, Alert and Oriented, no Hoarseness, strong voice  EYES: EOMI, Anti-icteric  HENT:   Head: Normocephalic and atraumatic.    Face:  Symmetric, facial nerve intact, no sinus tenderness  Right Ear: Normal external ear, normal external auditory canal, intact tympanic membrane with normal mobility and aerated middle ear  Left Ear: There is a large 7 cm exophytic lesion of the left auricle extending posteriorly to the sulcus and anteriorly to the conchal bowl. There is no obvious erosion of the external auditory canal.  Mouth/Oral Cavity:  normal lips, Uvula is midline, no mucosal lesions, no trismus, fair dentition, normal salivary quality/flow  Oropharynx/Larynx:  normal oropharynx, normal tonsils; normal larynx/nasopharynx on mirror exam  Nose:Normal external nasal appearance. Anterior rhinoscopy shows a line septum. Normal turbinates. Normal mucosa   NECK: Normal range of motion, no thyromegaly, trachea is midline, no lymphadenopathy, no neck masses, no crepitus  CHEST: Normal respiratory effort, no retractions, breathing comfortably  SKIN: No rashes, normal appearing skin, no evidence of skin lesions/tumors  Neuro:  cranial nerve II-XII intact; normal gait  Cardio:  no edema    There is a large 7 cm mass located about the midline of the superior aspect of the sternum. This is ulcerated and tender to palpation. PROCEDURE-from initial consult    PROCEDURE  Incisional biopsy of left ear lesion and anterior chest wall lesion - CPT 44727 and 19452    Pre op: Nasal tip lesion  Post op: Same  Procedure :   Surgeon: Portillo Mcguire MD  Anesthesia: 2% Xylocaine with 1:[id number] epi     Description: After written consent was obtained the ear lesion and chest lesion were visualized and the surrounding area was infiltrated with 2% Xylocaine with 1 100,000 epinephrine. After allowing adequate anesthesia to take place, a scalpel biopsy was used for each site. A pair of iris scissors were utilized to make sharp cuts at the base of the lesion and a portion of the lesion was removed from both sites. Hemostasis was obtained and the would was closed if appropriate. Pertinent findings: Full biopsy of both sites. The patient tolerated the procedure well and there were no complications. 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 Ear mass, left    3. Anterior chest wall squamous cell carcinoma    4. CLL (chronic lymphocytic leukemia) ([facility]    5. Neck mass      This is an extremely complicated case due to multiple patient comorbidities including his history of chronic lymphocytic leukemia, current chemotherapy and history of multiple skin cancers of the head and neck. From the history of was able to obtain from review of medical records the patient and in discussion with his treating oncologist Dr. Cee Monahan it sounds like the patient had the lesion of his ear and chest for some time and they had been stable until recently. It appears as if he has had rapid progression of the disease process of both areas. This is now biopsy-proven squamous cell carcinoma both sites. I have discussed potential treatment options with the patient and his wife in detail. They would like to proceed with surgical excision of the lesions. They understand that this is meant to be a palliative procedure. I did discuss that it would likely be a left total auriculectomy and my partner Dr. Kavitha Cline would perform the resection of the chest lesion and assist with reconstruction of both areas. We also discussed that while the patient could undergo larger procedures requiring more aggressive treatment and reconstruction options, based on his quality of life and disease burden in relationship to his CLL the the less invasive approach would be preferable. I also discussed with the patient that I would like to biopsy his right ear as well as 2 lesions on either side of the central chest lesion is these are all concerning for squamous cell. This significantly complicates his management as if these were positive he would require further resection of these sites. We did discuss potentially a larger resection of the chest wall if this were the case.   Regarding the ear he is dependent on his hearing aid on the right and so we would not be as aggressive with resecting the right ear should this turn out to be squamous cell as well. The risks benefits and alternatives to surgery were discussed in detail including risks of bleeding infection scarring persistent or recurrent cancer, injuries to nerves and blood vessels of the head and neck causing numbness or weakness. Additionally we discussed the potential for wound breakdown and failure of her reconstructive options. Patient and his wife expressed understanding and would like to proceed. I have asked the patient to obtain perioperative clearance from his primary care physician. In addition the patient has significant wound care issues related to his anterior chest wall lesion. I have asked them to try to coordinate with his treating oncologist and primary care physician regarding setting up wound care until we are able to perform the surgery. His wife is significantly limited in her ability to perform this due to her blindness. I discussed his case in detail with my plastic surgery colleague Dr. Juliane Calderon, and we will schedule him going forward as soon as possible. I will see him back next week to discuss his biopsy results. The patient was counseled at length about the risks of liliana Covid-19 during their perioperative period and any recovery window from their procedure. The patient was made aware that liliana Covid-19  may worsen their prognosis for recovering from their procedure  and lend to a higher morbidity and/or mortality risk. All material risks, benefits, and reasonable alternatives including postponing the procedure were discussed. The patient does wish to proceed with the procedure at this time. I have performed a head and neck physical exam personally or was physically present during the key or critical portions of the service. Medical Decision Making:   The following items were considered in medical decision making:  Independent review of images  Review / order clinical lab tests  Review / order radiology tests  Decision to obtain old records

## 2020-10-15 NOTE — TELEPHONE ENCOUNTER
Routing to MD for surgical letter so we are aware of what needs to be scheduled and amount of time needed. Then office will work on scheduling with Dr Rosanne Ortiz office.

## 2020-10-16 NOTE — TELEPHONE ENCOUNTER
I spoke with Uzair Haile at [facility] ([phone]) to see if CPT Codes [place], [address], [address] or [phone] need a pre authorization. CPT Code 17650 is invalid or . Do you want to replace the code with something else or remove it from the surgery letter? No pre authorization if out patient surgery. The surgery letter states admit. If the patient will need to be admitted then we will need to pre cert all valid codes listed. Fax # for clinicals [phone]. Reference # [id number]. Dr. Cj Farrell, do you want to change or remove CPT Code 55862? Admit or outpatient? If admit, we will need to see the patient in the office for a new patient appointment prior to submitting for pre authorization in order to obtain clinicals.      I will route to MD.

## 2020-10-16 NOTE — TELEPHONE ENCOUNTER
[facility]    We can omit code 24699. If needed for admission, it will be admitted to the ENT service.     Thanks,  NK

## 2020-10-19 NOTE — TELEPHONE ENCOUNTER
Lyn Richardson called from Dr Kelly Fall office and said that he did get block time at [facility] on the Oct 26th at 7:30 for this case? Does that work for you?  Please advise

## 2020-10-19 NOTE — TELEPHONE ENCOUNTER
I spoke with Vee Dickerson at the office of Dr. Veda Avendaño to advise that Dr. Phillip Lima has time held for the patient at [facility] 10- 7:30 am. She will check with her doctor tomorrow and call me back.

## 2020-10-19 NOTE — TELEPHONE ENCOUNTER
I removed CPT Code 30406 from the surgery letter. I also changed the patient status to outpatient on the surgery letter. Based on the changes made to the surgery letter it does not require a pre authorization. I will reach out to the office of Dr. Jess Johnson to work on scheduling.

## 2020-10-19 NOTE — TELEPHONE ENCOUNTER
Lm Klein from the office of Dr. Lisseth Herron called me to let me know that the date and time mentioned below works for the doctor. I spoke with patient to discuss and confirm surgery date and time of Monday October 26, 2020 7:30 am with an arrival time of 5:30 am. I will submit the surgery letter of Dr. Cecilia Chen. Lm Klein with Dr. Lsiseth Herron will submit her surgery letter as well. I verbally discussed the need for H&P and COVID testing and reviewed all surgery instructions and information. The patient lives on the [address] [place] [address] and will go to [facility] tomorrow 10-. The patient is scheduled for his post op 11-2-2020 2 pm.    I will close this phone.

## 2020-10-19 NOTE — TELEPHONE ENCOUNTER
I spoke with Florentin Narayan at the office of Dr. Nelly Hanson to discuss co-case mentioned below. She will discuss dates, times and surgery block with Dr. Gomez Reading tomorrow and call me back.

## 2020-10-20 NOTE — PROGRESS NOTES
[facility] / [facility] ([facility]) [address] [facility], [address]    Acknowledgment of Informed Consent for Surgical or Medical Procedure and Sedation  I agree to allow doctor(s) Lizzette Briones and his/her associates or assistants, including residents and/or other qualified medical practitioner to perform the following medical treatment or procedure and to administer or direct the administration of sedation as necessary:  Procedure(s): TOTAL LEFT AURICULECTOMY, RIGHT AURICLE BIOPSY RESECTION OF ANTERIOR CHEST WALL LESION, RECONSTRUCTION WITH SKIN GRAFT, LOCAL TISSUE FLAPS EXCISION OF FUNGATING CHEST SQUAMOUS CELL CARCINOMA, SPLIT THICKNESS SKIN GRAFT FOR CONSTRUCTION OF CHEST, POSSIBLE TEMPOPARIETAL MUSCLE FLAP FOR AURICULAR RECONSTRUCTION, POSSIBLE FULL THICKNESS OR SPLIT THICKNESS GRAFT   My doctor has explained the following regarding the proposed procedure:   the explanation of the procedure   the benefits of the procedure   the potential problems that might occur during recuperation   the risks and side effects of the procedure which could include but are not limited to severe blood loss, infection, stroke or death   the benefits, risks and side effect of alternative procedures including the consequences of declining this procedure or any alternative procedures   the likelihood of achieving satisfactory results. I acknowledge no guarantee or assurance has been made to me regarding the results. I understand that during the course of this treatment/procedure, unforeseen conditions can occur which require an additional or different procedure. I agree to allow my physician or assistants to perform such extension of the original procedure as they may find necessary. I understand that sedation will often result in temporary impairment of memory and fine motor skills and that sedation can occasionally progress to a state of deep sedation or general anesthesia.     I

## 2020-10-20 NOTE — PROGRESS NOTES
Patient presented to [facility] drive up clinic for preop testing. Patient was swabbed and given information advising them to remain isolated until procedure date.

## 2020-10-22 NOTE — RESULT ENCOUNTER NOTE

## 2020-10-22 NOTE — PROGRESS NOTES
[facility] PRE-SURGICAL TESTING INSTRUCTIONS                              PRIOR TO PROCEDURE DATE:  1. Please follow any guidelines/instructions prior to your procedure as advised by your surgeon. 2. Arrange for someone to drive you home and be with you for the first 24 hours after discharge for your safety after your procedure for which you received sedation. Ensure it is someone we can share information with regarding your discharge. 3. You must contact your surgeon for instructions IF:   You are taking any blood thinners, aspirin, anti-inflammatory or vitamin E.   There is a change in your physical condition such as a cold, fever, rash, cuts, sores or any other infection, especially near your surgical site. 4. Do not drink alcohol the day before or day of your procedure. 5. A Pre-op History and Physical for surgery MUST be completed by your Physician or Urgent Care within 30 days of your procedure date. Please bring a copy with you on the day of your procedure and along with any other testing performed. THE DAY OF YOUR PROCEDURE:  1. Follow instructions for ARRIVAL TIME as DIRECTED BY YOUR SURGEON. I    2. Enter the MAIN entrance from [address] and follow the signs to the free [employer] or [place] parking (offered free of charge 6am-5pm). 3. Enter the Main Entrance of the hospital (do not enter from the lower level of the parking garage). Upon entrance, check in with the  at the main desk on your left. If no one is available at the desk, proceed into the [facility] Waiting Room and go through the door directly into the [facility]. There is a Check-in desk ACROSS from Room 5 (marked with a sign hanging from the ceiling). The phone number for the surgery center is [phone]. 4. Please call [phone] option #2 option #2 if you have not been preregistered yet. On the day of your procedure bring your insurance card and photo ID.  You will be registered at your bedside once brought back to your room. 5. DO NOT EAT ANYTHING eight hours prior to surgery. May have 8 ounces of water 4 hours prior to surgery. 6. MEDICATIONS    Take the following medications with a SMALL sip of water: BRING CPAP   Use your usual dose of inhalers the morning of surgery. BRING your rescue inhaler with you to hospital.    Anesthesia does NOT want you to take insulin the morning of surgery. They will control your blood sugar while you are at the hospital. Please contact your ordering physician for instructions regarding your insulin the night before your procedure. If you have an insulin pump, please keep it set on basal rate. 7. Do not swallow water when brushing teeth. No gum, candy, mints or ice chips. Refrain from smoking or at least decrease the amount. 8. Dress in loose, comfortable clothing appropriate for redressing after your procedure. Do not wear jewelry (including body piercings), make-up (especially NO eye make-up), fingernail polish (NO toenail polish if foot/leg surgery), lotion, powders or metal hairclips. 9. Dentures, glasses, or contacts will need to be removed before your procedure. Bring cases for your glasses, contacts, dentures, or hearing aids to protect them while you are in surgery. 10. If you use a CPAP, please bring it with you on the day of your procedure. 11. We recommend that valuable personal  belongings such as cash, cell phones, e-tablets or jewelry, be left at home during your stay. The hospital will not be responsible for valuables that are not secured in the hospital safe. However, if your insurance requires a co-pay, you may want to bring a method of payment, i.e. Check or credit card, if you wish to pay your co-pay the day of surgery. 12. If you are to stay overnight, you may bring a bag with personal items.  Please have any large items you may need brought in by your family after your arrival to your hospital room.    13. If you have a Living Will or Durable Power of , please bring a copy on the day of your procedure. 15. With your permission, one family member may accompany you while you are being prepared for surgery. Once you are ready, additional family members may join you. HOW WE KEEP YOU SAFE and WORK TO PREVENT SURGICAL SITE INFECTIONS:  1. Health care workers should always check your ID bracelet to verify your name and birth date. You will be asked many times to state your name, date of birth, and allergies. 2. Health care workers should always clean their hands with soap or alcohol gel before providing care to you. It is okay to ask anyone if they cleaned their hands before they touch you. 3. You will be actively involved in verifying the type of procedure you are having and ensuring the correct surgical site. This will be confirmed multiple times prior to your procedure. Do NOT vasquez your surgery site UNLESS instructed to by your surgeon. 4. Do not shave or wax for 72 hours prior to procedure near your operative site. Shaving with a razor can irritate your skin and make it easier to develop an infection. On the day of your procedure, any hair that needs to be removed near the surgical site will be clipped by a healthcare worker using a special clippers designed to avoid skin irritation. 5. When you are in the operating room, your surgical site will be cleansed with a special soap, and in most cases, you will be given an antibiotic before the surgery begins. What to expect AFTER YOUR PROCEDURE:  1. Immediately following your procedure, your will be taken to the PACU for the first phase of your recovery. Your nurse will help you recover from any potential side effects of anesthesia, such as extreme drowsiness, changes in your vital signs or breathing patterns. Nausea, headache, muscle aches, or sore throat may also occur after anesthesia.   Your nurse will help you manage these potential side effects. 2. For comfort and safety, arrange to have someone at home with you for the first 24 hours after discharge. 3. You and your family will be given written instructions about your diet, activity, dressing care, medications, and return visits. 4. Once at home, should issues with nausea, pain, or bleeding occur, or should you notice any signs of infection, you should call your surgeon. 5. Always clean your hands before and after caring for your wound. Do not let your family touch your surgery site without cleaning their hands. 6. Narcotic pain medications can cause significant constipation. You may want to add a stool softener to your postoperative medication schedule or speak to your surgeon on how best to manage this SIDE EFFECT. SPECIAL INSTRUCTIONS     Thank you for allowing us to care for you. We strive to exceed your expectations in the delivery of care and service provided to you and your family. If you need to contact us for any reason, please call us at [phone]    Instructions reviewed with patient during preadmission testing phone interview. Micki Lofton. 10/22/2020 .2:45 PM      ADDITIONAL EDUCATIONAL INFORMATION REVIEWED PER PHONE WITH YOU AND/OR YOUR FAMILY:  No Bring a urine sample on day of surgery  No Hibiclens® Bathing Instructions   Yes Antibacterial Soap

## 2020-10-26 PROBLEM — D04.5 SQUAMOUS CELL CARCINOMA IN SITU (SCCIS) OF SKIN OF CHEST: Status: ACTIVE | Noted: 2020-01-01

## 2020-10-26 NOTE — PROGRESS NOTES
PACU Transfer Note    Vitals:    10/26/20 1600   BP: (!) 97/50   Pulse: 95   Resp: 24   Temp: 99.1 °F (37.3 °C)   SpO2: 95%       No intake/output data recorded.     Pain assessment:  Pain Level: 0    Report given to Receiving unit RN.    10/26/2020 4:14 PM

## 2020-10-26 NOTE — OP NOTE
reduce morbidity for the patient given his end-stage CLL. Risks and benefits discussed with the patient including alternate treatment options, Informed consent was obtained, the patient elected to proceed with the planned procedure. DETAILS OF PROCEDURE(S):   The patient was brought to the operating room, placed in a supine position, and induced and intubated per anesthesia. I first began with the left subtotal auriculectomy. An obvious fungating mass extending from the superior helix all the way down to the superior aspect of the lobule and inward to the antihelical rim. There was also extension posteriorly into the postauricular sulcus. Using a 15 blade an initial skin incision was made and then electrocautery was utilized to remove the specimen en bloc. Estimate was approximately 10 x 7 cm. After the specimen resection, I then proceeded to send individual frozen margins along the auricular remnant as well as the postauricular sulcus. These were ultimately negative for squamous cell carcinoma. After this, there was part of the chondral bowl, the superior helical root and a portion of the lobule remaining. Meticulous hemostasis was then obtained and the site was irrigated. I then proceeded with the right sided partial auriculectomy. This area was first biopsied and was confirmed for squamous cell carcinoma. At that point I then proceeded to outline a 1 x 4 cm area to be excised along the helical rim extending from the midportion of the superior helix approximately 3 cm along the outer helical rim. A 15 blade was then used to make the initial skin incision and then sharp dissection was performed to resect the specimen en bloc. I then proceeded to send a permanent margin along this as there was initial discussion with the patient and his wife that no radical resection would be performed despite final pathology. After this was performed meticulous hemostasis was obtained.     I then performed local tissue rearrangement with advancement for coverage of his auricular defect. The surrounding epidermis was elevated off of the auricular framework. Back cuts were made and the area was then rotated into place to provide closure. This completely covered the helical defect and reconstructed the helical rim. He still had good contour of his ear and would be able to wear his hearing aid as is. The separate portion of the procedure including the left auricular reconstruction and chest wall excisions were performed by my colleague Dr. Lynnette Aguillon and will be dictated separately. I attest that I was present for and did the entire procedure myself.     Estimated Blood Loss: less than 50 ml                 Specimens:   ID Type Source Tests Collected by Time Destination   A : RIGHT AURICULAR LESION  Tissue Tissue SURGICAL PATHOLOGY Troy Larry MD 10/26/2020 6397    B : CHEST WALL SQUAMOUS CELL CARCINOMA (SUTURES: SHORT-SUPERIOR, LONG-LATERAL) Tissue Tissue SURGICAL PATHOLOGY Troy Larry MD 10/26/2020 4091    C : LEFT POST AURICULAR SULUS INFERIOR - SUTURE SUPERIOR Tissue Tissue SURGICAL PATHOLOGY Troy Larry MD 10/26/2020 0913    D : LEFT POST AURICULAR SULUS SUPERIOR - SUTURE SUPERIOR Tissue Tissue SURGICAL PATHOLOGY Troy Larry MD 10/26/2020 8088    E : LEFT HELIX - SUTURE SUPERIOR Tissue Tissue [id number] [facility] MD Harjeet 10/26/2020 0919    F : LEFT CONCHAL BOWL - SUTURE SUPERIOR Tissue Tissue SURGICAL PATHOLOGY Troy Larry MD 10/26/2020 8156    G : LEFT INFERIOR CONCHAL BOWL - SUTURE SUPERIOR (ANALIZE INK MARGIN) Tissue Tissue SURGICAL PATHOLOGY Troy Larry MD 10/26/2020 7721    H : LEFT LOVULE - SUTURE SUPERIOR (ANALIZE INK MARGIN) Tissue Tissue SURGICAL PATHOLOGY Troy Larry MD 10/26/2020 5582    I : LEFT MASTIOD - SUTURE SUPERIOR Tissue Tissue SURGICAL PATHOLOGY Troy Larry MD 10/26/2020 4184    J : RIGHT PARTIAL AURICULECTOMY - SUTURE SUPERIOR Tissue Tissue SURGICAL PATHOLOGY Troy Larry MD 10/26/2020 0939    K : RIGHT AURICULECTOMY MARGIN - SUTURE SUPERIOR Tissue Tissue SURGICAL PATHOLOGY Fidel Smith MD 10/26/2020 0728    L : RIGHT LATERAL CHEST LESION (SUTURES: SHORT SUPERIOR, LONG LATERAL) Tissue Tissue SURGICAL PATHOLOGY Fidel Smith MD 10/26/2020 0945    M : LEFT SUB-TOTAL AURICULECTOMY Tissue Tissue SURGICAL PATHOLOGY Fidel Smith MD 10/26/2020 6447               Drains: None    Complications: There were no complications.     Fidel Smith

## 2020-10-26 NOTE — PROGRESS NOTES
Patient admitted to 5501 from PACU. Patient is blind and hard of hearing. Vital signs stable, denies having any pain at this time. Surgical sites to bilateral ears TOÑO with sutures. Small amount of serosanguinous drainage noted to both ears. Wound vac in place to chest with no output. Noted bleeding to skin graft site on L outer thigh. 4 eyes skin assessment completed with second RN, see flow sheets. Patient educated on fall precautions. Bed alarm engaged and call light placed within reach. Wife is blind as well and at bedside with seeing eye dog. This RN educated wife regarding visitor policy. Patient resting in bed with no further needs. Will continue to monitor.

## 2020-10-26 NOTE — PROGRESS NOTES
0720 sacral pad applied due to length of surgery.   0730 pt's wife is blind, escorted her and eye seeing dog to waiting room

## 2020-10-26 NOTE — PROGRESS NOTES
Department of Surgery  Post Op Note    Objective: Patient resting in bed. Still remains in PACU. Denies any c/o pain. Denies any c/o nausea, no emesis. Please note the patient is blind and also [place]    Anesthesia type: General      I/O    Intra op    Post op     Fluids    1595 ml      EBL       75 ml      Urine      n/a        Exam: VITALS:  BP (!) 103/51   Pulse 94   Temp 98.2 °F (36.8 °C) (Temporal)   Resp 24   Ht 5' 4\" (1.626 m)   Wt 161 lb (73 kg)   SpO2 96%   BMI 27.64 kg/m²   Post-op vital signs:  Stable     Exam:General appearance: alert, appears stated age and cooperative  Lungs: clear to auscultation bilaterally  Heart: regular rate and rhythm, S1, S2 normal, no murmur, click, rub or gallop  Abdomen: soft, non-tender; bowel sounds normal; no masses,  no organomegaly  ENT:  Right ear oozy. Left ear also oozy. Sutures intact. No active signs of bleeding. Wound vac present on chest.  No active signs of bleeding  LLE:  Skin graft area on left upper thigh. Tegaderm in place. Graft site oozy but not actively bleeding    Assessment and Plan  Pt is a 76year old male s/p excision of fungating chest wall SCC with skin graft and bilateral partial auriculectomy with tissue treatment POD #0    Pain management: continue with dilaudid  FeNa:  Diet - Carb Control , Fluids NS @ 75 ml/hour   :  Patient has not urinated since surgery. Will continue to monitor closely  Ambulation: OOB to chair  Respiratory:  IS at bedside, encourage hourly IS and deep breathing  Prophylaxis: AC boots    Wound Vac was placed on prevena setting. Patient has black wound vac foam present under drape. Wound vac place to regular vac setting @ 125 mm/Hg. No leak present    KVNG Chambers NP-C  [phone]  Resident Support Staff

## 2020-10-26 NOTE — ANESTHESIA PRE PROCEDURE
Department of Anesthesiology  Preprocedure Note       Name:  Altagracia Billy   Age:  76 y.o.  :  1945                                          MRN:  [id number]         Date:  10/26/2020      Surgeon: Papo Pisano):  MD Mike Connell MD    Procedure: Procedure(s):  TOTAL LEFT AURICULECTOMY, RIGHT AURICLE BIOPSY RESECTION OF ANTERIOR CHEST WALL LESION, RECONSTRUCTION WITH SKIN GRAFT, LOCAL TISSUE FLAPS  EXCISION OF FUNGATING CHEST SQUAMOUS CELL CARCINOMA, SPLIT THICKNESS SKIN GRAFT FOR RECONSTRUCTION OF CHEST, POSSIBLE TEMPOPARIETAL MUSCLE FLAP FOR AURICULAR RECONSTRUCTION, POSSIBLE FULL THICKNESS OR SPLIT THICKNESS GRAFT  . Medications prior to admission:   Prior to Admission medications    Medication Sig Start Date End Date Taking? Authorizing Provider   CALQUENCE 100 MG CAPS 2 times daily  20  Yes Historical Provider, MD   acetaminophen (APAP EXTRA STRENGTH) 500 MG tablet Take 1 tablet by mouth every 6 hours as needed for Pain or Fever 19  Yes BEREKET Recio   glimepiride (AMARYL) 1 MG tablet Take 1 mg by mouth Daily with supper    Yes Historical Provider, MD   Insulin Degludec (TRESIBA FLEXTOUCH) 100 UNIT/ML SOPN Inject 22-30 Units into the skin nightly    Yes Historical Provider, MD   venlafaxine (EFFEXOR) 37.5 MG tablet Take 37.5 mg by mouth daily    Yes Historical Provider, MD   Liraglutide (VICTOZA) 18 MG/3ML SOPN SC injection Inject 0.6 mg into the skin daily   Yes Historical Provider, MD   lisinopril (PRINIVIL;ZESTRIL) 20 MG tablet Take 20 mg by mouth daily    Yes Historical Provider, MD   MULTIPLE VITAMIN PO Take  by mouth. Take 1 vitamin a day. Senior Vitamin   Yes Historical Provider, MD   desloratadine (CLARINEX) 5 MG tablet Take 5 mg by mouth daily. Yes Historical Provider, MD   fenofibrate (TRICOR) 145 MG tablet Take 145 mg by mouth daily.    Yes Historical Provider, MD       Current medications:    Current Facility-Administered Medications   Medication Dose Route  APPENDECTOMY      COLONOSCOPY      FINE NEEDLE ASPIRATION  2006    HAND SURGERY Left 10/16/14    excision left thumb lesion with skin graft/trigger finger release left thumb and 5th finger    LYMPH NODE DISSECTION  2008    PRE-MALIGNANT / BENIGN SKIN LESION EXCISION      : excision 3.4 cm right shoulder lesion and 1.7 cm incisional biopsy left thumb    TUNNELED VENOUS PORT PLACEMENT Left 2006    Removed on 5/5/2020 by Dr. Simin Knutson TUNNELED VENOUS PORT PLACEMENT  2006    TUNNELED VENOUS PORT PLACEMENT Right 05/05/2020    SmartPort; RIJ access; cut at 25 cm; Dr. Armen Prince History:    Social History     Tobacco Use    Smoking status: Never Smoker    Smokeless tobacco: Never Used   Substance Use Topics    Alcohol use: Yes     Comment: rare use                                Counseling given: Not Answered      Vital Signs (Current):   Vitals:    10/22/20 1439 10/26/20 0621   BP:  107/69   Pulse:  98   Resp:  14   Temp:  98.3 °F (36.8 °C)   TempSrc:  Oral   SpO2:  95%   Weight: 161 lb (73 kg) 161 lb (73 kg)   Height: 5' 4\" (1.626 m) 5' 4\" (1.626 m)                                              BP Readings from Last 3 Encounters:   10/26/20 107/69   10/14/20 107/68   10/06/20 116/66       NPO Status:                                                                                 BMI:   Wt Readings from Last 3 Encounters:   10/26/20 161 lb (73 kg)   05/05/20 165 lb (74.8 kg)   03/24/20 165 lb 12.6 oz (75.2 kg)     Body mass index is 27.64 kg/m².     CBC:   Lab Results   Component Value Date    WBC 2.2 03/24/2020    RBC 3.52 03/24/2020    RBC 4.51 07/20/2017    HGB 10.6 03/24/2020    HCT 31.0 03/24/2020    MCV 87.9 03/24/2020    RDW 16.3 03/24/2020    PLT 94 05/05/2020       CMP:   Lab Results   Component Value Date     10/06/2020    K 5.5 10/06/2020    K 4.1 03/23/2020     10/06/2020    CO2 26 10/06/2020    BUN 28 10/06/2020    CREATININE 1.2 10/06/2020    GFRAA >60 10/06/2020 AGRATIO 1.5 03/24/2020    LABGLOM 59 10/06/2020    GLUCOSE 87 10/06/2020    GLUCOSE 167 07/20/2017    PROT 5.8 03/24/2020    PROT 6.4 07/20/2017    CALCIUM 9.6 10/06/2020    BILITOT 0.3 03/24/2020    ALKPHOS 53 03/24/2020    AST 22 03/24/2020    ALT 10 03/24/2020       POC Tests: No results for input(s): POCGLU, POCNA, POCK, POCCL, POCBUN, POCHEMO, POCHCT in the last 72 hours. Coags:   Lab Results   Component Value Date    PROTIME 12.2 05/05/2020    INR 1.05 05/05/2020    APTT 25.5 09/05/2013       HCG (If Applicable): No results found for: PREGTESTUR, PREGSERUM, HCG, HCGQUANT     ABGs:   Lab Results   Component Value Date    PHART 7.398 12/20/2016    PO2ART 103.0 12/20/2016    XQE1TZY 37.0 12/20/2016    SQQ5SLN 22.3 12/20/2016    BEART -1.6 12/20/2016    S0RMQTVH 98.4 12/20/2016        Type & Screen (If Applicable):  No results found for: LABABO, LABRH    Drug/Infectious Status (If Applicable):  No results found for: HIV, HEPCAB    COVID-19 Screening (If Applicable):   Lab Results   Component Value Date    COVID19 Not Detected 10/20/2020         Anesthesia Evaluation    Airway: Mallampati: II  TM distance: >3 FB   Neck ROM: limited  Mouth opening: > = 3 FB Dental:          Pulmonary:   (+) pneumonia:  sleep apnea: on CPAP,      (-) asthma                           Cardiovascular:  Exercise tolerance: poor (<4 METS),   (+) hypertension:,     (-) past MI, dysrhythmias,  angina and  [place]                Neuro/Psych:      (-) seizures and CVA           GI/Hepatic/Renal:        (-) GERD       Endo/Other:    (+) DiabetesType II DM, , malignancy/cancer. Abdominal:           Vascular:                                      Anesthesia Plan      general     ASA 3     (-npo MN  -denies chest pain or palp. Pt able to walk slowly, denies sob. -s/p radiation to his neck, does have occasional trouble swallowing food. Denies breathing trouble, able to lay flat at night.   Turning of head seems adequate but he has a lot of pain in his ear.  -blindness  -low platelets/Hg with h/o CLL  -blindness)  Induction: intravenous. MIPS: Postoperative opioids intended. Anesthetic plan and risks discussed with patient. Plan discussed with CRNA.                 Tip Morton MD   10/26/2020

## 2020-10-26 NOTE — H&P
Magnus Zimmerman    [id number]    [facility] Same Day Surgery Update H & P  Department of General Surgery   Surgical Service   Pre-operative History and Physical  Last H & P within the last 30 days. DIAGNOSIS:   SCC (squamous cell carcinoma), ear, left [C44.229]    Procedure(s):  TOTAL LEFT AURICULECTOMY, RIGHT AURICLE BIOPSY RESECTION OF ANTERIOR CHEST WALL LESION, RECONSTRUCTION WITH SKIN GRAFT, LOCAL TISSUE FLAPS  EXCISION OF FUNGATING CHEST SQUAMOUS CELL CARCINOMA, SPLIT THICKNESS SKIN GRAFT FOR RECONSTRUCTION OF CHEST, POSSIBLE TEMPOPARIETAL MUSCLE FLAP FOR AURICULAR RECONSTRUCTION, POSSIBLE FULL THICKNESS OR SPLIT THICKNESS GRAFT  . HISTORY OF PRESENT ILLNESS:   Patient with 1) biopsy demonstrated SCC of the left ear and 2) anterior chest wall lesions presents today for the above procedures. Covid 19:  Patient denies fever, chills, cough or known exposure to Covid-19.        Past Medical History:        Diagnosis Date    Anxiety     Blind     Blood disorder     Cancer ([address]     chronic lymphoid leukemia    Diabetes mellitus ([address]     type 2    Encounter for long-term (current) use of high-risk medication 2/8/2016    Glaucoma     [place] (hard of hearing)     wears hearing aid    Hyperlipidemia     Hypertension     Sleep apnea     CPAP    Wound, open     open draining chest wound w/ dsd     Past Surgical History:        Procedure Laterality Date    APPENDECTOMY      COLONOSCOPY      FINE NEEDLE ASPIRATION  2006    HAND SURGERY Left 10/16/14    excision left thumb lesion with skin graft/trigger finger release left thumb and 5th finger    LYMPH NODE DISSECTION  2008    PRE-MALIGNANT / BENIGN SKIN LESION EXCISION      : excision 3.4 cm right shoulder lesion and 1.7 cm incisional biopsy left thumb    TUNNELED VENOUS PORT PLACEMENT Left 2006    Removed on 5/5/2020 by Dr. Tamayo [facility] TUNNELED VENOUS PORT PLACEMENT  2006    TUNNELED VENOUS PORT PLACEMENT Right 05/05/2020    Alina; Katalina Alonzo access; cut at 25 cm; Dr. Vishal Tipton     Past Social History:  Social History     Socioeconomic History    Marital status:      Spouse name: None    Number of children: None    Years of education: None    Highest education level: None   Occupational History    None   Social Needs    Financial resource strain: None    Food insecurity     Worry: None     Inability: None    Transportation needs     Medical: None     Non-medical: None   Tobacco Use    Smoking status: Never Smoker    Smokeless tobacco: Never Used   Substance and Sexual Activity    Alcohol use: Yes     Comment: rare use    Drug use: No    Sexual activity: None   Lifestyle    Physical activity     Days per week: None     Minutes per session: None    Stress: None   Relationships    Social connections     Talks on phone: None     Gets together: None     Attends Shinto service: None     Active member of club or organization: None     Attends meetings of clubs or organizations: None     Relationship status: None    Intimate partner violence     Fear of current or ex partner: None     Emotionally abused: None     Physically abused: None     Forced sexual activity: None   Other Topics Concern    None   Social History Narrative    None         Medications Prior to Admission:      Prior to Admission medications    Medication Sig Start Date End Date Taking?  Authorizing Provider   CALQUENCE 100 MG CAPS 2 times daily  9/14/20  Yes Historical Provider, MD   acetaminophen (APAP EXTRA STRENGTH) 500 MG tablet Take 1 tablet by mouth every 6 hours as needed for Pain or Fever 5/27/19  Yes BEREKET Recio   glimepiride (AMARYL) 1 MG tablet Take 1 mg by mouth Daily with supper    Yes Historical Provider, MD   Insulin Degludec (TRESIBA FLEXTOUCH) 100 UNIT/ML SOPN Inject 22-30 Units into the skin nightly    Yes Historical Provider, MD   venlafaxine (EFFEXOR) 37.5 MG tablet Take 37.5 mg by mouth daily    Yes Historical Provider, MD   Liraglutide (VICTOZA) 18 MG/3ML SOPN SC injection Inject 0.6 mg into the skin daily   Yes Historical Provider, MD   lisinopril (PRINIVIL;ZESTRIL) 20 MG tablet Take 20 mg by mouth daily    Yes Historical Provider, MD   MULTIPLE VITAMIN PO Take  by mouth. Take 1 vitamin a day. Senior Vitamin   Yes Historical Provider, MD   desloratadine (CLARINEX) 5 MG tablet Take 5 mg by mouth daily. Yes Historical Provider, MD   fenofibrate (TRICOR) 145 MG tablet Take 145 mg by mouth daily. Yes Historical Provider, MD         Allergies:  Patient has no known allergies. PHYSICAL EXAM:      /69   Pulse 98   Temp 98.3 °F (36.8 °C) (Oral)   Resp 14   Ht 5' 4\" (1.626 m)   Wt 161 lb (73 kg)   SpO2 95%   BMI 27.64 kg/m²      Airway:  Airway patent with no audible stridor    Heart:  regular rate and rhythm, No murmur noted    Lungs:  No increased work of breathing, good air exchange, clear to auscultation bilaterally, no crackles or wheezing    Abdomen:  soft, non-distended, non-tender, no rebound tenderness or guarding, normal active bowel sounds and no masses palpated    ASSESSMENT AND PLAN     Patient is a 76 y.o. male with above specified procedure planned. 1.  Patient seen and focused exam done today- no new changes since last physical exam on 10/20/20    2. Access to ancillary services are available per request of the provider.     ESTHER Vilchis - CNP     10/26/2020

## 2020-10-26 NOTE — PROGRESS NOTES
4 Eyes Admission Assessment     I agree as the admission nurse that 2 RN's have performed a thorough Head to Toe Skin Assessment on the patient. ALL assessment sites listed below have been assessed on admission. Areas assessed by both nurses: yes  [x]   Head, Face, and Ears   [x]   Shoulders, Back, and Chest  [x]   Arms, Elbows, and Hands   [x]   Coccyx, Sacrum, and Ischium  [x]   Legs, Feet, and Heels        Does the Patient have Skin Breakdown?     Wound vac to chest  Graft site to left thigh  Graft site to left and right ear  Discoloration to ankle and feet bilaterally  Callus to left foot  Stiches above right nipple  Scattered abrasions to chest  Scab and abrasions to head   Redness to bottom        Jimi Prevention initiated:  Yes   Wound Care Orders initiated:  No      [facility] nurse consulted for Pressure Injury (Stage 3,4, Unstageable, DTI, NWPT, and Complex wounds) or Jimi score 18 or lower:  No     Nurse 1 eSignature: Electronically signed by Ceci Cox RN on 10/26/20 at 4:57 PM EDT    **SHARE this note so that the co-signing nurse is able to place an eSignature**    Nurse 2 eSignature: Electronically signed by Jens Jacinto RN on 10/26/20 at 5:06 PM EDT

## 2020-10-26 NOTE — PROGRESS NOTES
Patient admitted to PACU # 7 from OR at 111 post TOTAL LEFT AURICULECTOMY, RIGHT AURICLE BIOPSY, RIGHT PARTIAL AURICULECTOMY,  RESECTION OF ANTERIOR CHEST WALL LESION, RECONSTRUCTION WITH SKIN GRAFT, LOCAL TISSUE FLAPS, EXCISION OF FUNGATING CHEST SQUAMOUS CELL CARCINOMA, SPLIT THICKNESS SKIN GRAFT FOR RECONSTRUCTION OF CHEST, POSSIBLE TEMPOPARIETAL MUSCLE FLAP FOR AURICULAR RECONSTRUCTION, SPLIT THICKNESS GRAFT per Dr. Maryuri Arrington and Dr. Kaylen Ferrer. Attached to PACU monitoring system and report received from anesthesia provider. Patient was reported to be hemodynamically stable during procedure. Patient unresponsive on arrival during CRNA report, not showing any s/sx of pain currently.

## 2020-10-26 NOTE — ANESTHESIA POSTPROCEDURE EVALUATION
Department of Anesthesiology  Postprocedure Note    Patient: Princess Barry  MRN: [id number]  YOB: 1945  Date of evaluation: 10/26/2020  Time:  12:25 PM     Procedure Summary     Date:  10/26/20 Room / Location:  [facility] [address]  / [facility]    Anesthesia Start:  0730 Anesthesia Stop:  1118    Procedures:       TOTAL LEFT AURICULECTOMY, RIGHT AURICLE BIOPSY, RIGHT PARTIAL AURICULECTOMY,  RESECTION OF ANTERIOR CHEST WALL LESION, RECONSTRUCTION WITH SKIN GRAFT, LOCAL TISSUE FLAPS (N/A )      EXCISION OF FUNGATING CHEST SQUAMOUS CELL CARCINOMA, SPLIT THICKNESS SKIN GRAFT FOR RECONSTRUCTION OF CHEST, POSSIBLE TEMPOPARIETAL MUSCLE FLAP FOR AURICULAR RECONSTRUCTION, SPLIT THICKNESS GRAFT (N/A )      . (N/A ) Diagnosis:       SCC (squamous cell carcinoma), ear, left      (SCC (squamous cell carcinoma), ear, left [C44.229] R22.1 Anterior  Chest Wall Squamous Cell)    Surgeon:  Jannet Mckeon MD; Prieto Stern MD Responsible Provider:  Rodri Davis MD    Anesthesia Type:  general ASA Status:  3          Anesthesia Type: general    Samson Phase I: Samson Score: 5    Samson Phase II:      Last vitals: Reviewed and per EMR flowsheets.        Anesthesia Post Evaluation    Patient location during evaluation: PACU  Patient participation: complete - patient participated  Level of consciousness: awake  Pain score: 2  Airway patency: patent  Nausea & Vomiting: no nausea and no vomiting  Complications: no  Cardiovascular status: hemodynamically stable  Respiratory status: acceptable  Hydration status: euvolemic

## 2020-10-27 NOTE — PROGRESS NOTES
Yes  Activity Tolerance  Activity Tolerance: Patient Tolerated treatment well       Patient Diagnosis(es): The encounter diagnosis was SCC (squamous cell carcinoma), ear, left.     has a past medical history of Anxiety, Blind, Blood disorder, Cancer [address] [address] Diabetes mellitus ([facility] [address] Encounter for long-term (current) use of high-risk medication, Glaucoma, [place] (hard of hearing), Hyperlipidemia, Hypertension, Sleep apnea, and Wound, open. has a past surgical history that includes pre-malignant / benign skin lesion excision; Tunneled venous port placement (Left, 2006); Hand surgery (Left, 10/16/14); lymph node dissection (2008); fine needle aspiration (2006); Tunneled venous port placement (2006); Appendectomy; Tunneled venous port placement (Right, 05/05/2020); Colonoscopy; External ear surgery (N/A, 10/26/2020); skin biopsy (N/A, 10/26/2020); and Skin graft (N/A, 10/26/2020). Restrictions  Position Activity Restriction  Other position/activity restrictions: up as tolerated, ambulate patient, wound vac  Vision/Hearing  Vision: Impaired(patient is blind in both eyes)  Hearing: Exceptions to [facility]  Hearing Exceptions: Hard of hearing/hearing concerns     Subjective  General  Chart Reviewed: Yes  Patient assessed for rehabilitation services?: Yes  Additional Pertinent Hx: Patient is a 77 y/o male admitted 10/26 for excision of fungating chest wall SCC with skin graft and bilateral partial auriculectomy with tissue treatment. PMH significant for HTN, HLD, diabetes, CLL, blind, hard of hearing  Response To Previous Treatment: Not applicable  Family / Caregiver Present: Yes(wife)  Referring Practitioner: Becka Marte DO  Referral Date : 10/27/20  Diagnosis: squamous cell carcinoma  Follows Commands: Within Functional Limits  General Comment  Comments: Patient supine in bed upon arrival.  Subjective  Subjective: Patient agreeable to PT evaluation.   Pain Screening  Patient Currently in Pain: Denies  Vital Signs  Patient Currently in Pain: Denies       Orientation  Orientation  Overall Orientation Status: Within Functional Limits  Social/Functional History  Social/Functional History  Lives With: Spouse  Type of Home: (condo)  Home Layout: One level  Home Access: Stairs to enter with rails  Entrance Stairs - Number of Steps: 8  Entrance Stairs - Rails: Both  Bathroom Shower/Tub: Walk-in shower  Bathroom Toilet: Standard  Bathroom Equipment: Shower chair  Home Equipment: Cane(cane for visual deficits)  ADL Assistance: Independent  Homemaking Assistance: (wife performs, has assistance 1x/week for cleaning)  Homemaking Responsibilities: No  Ambulation Assistance: Independent  Transfer Assistance: Independent  Active : No  Additional Comments: patient has groceries delivered; reports last fall two years ago  Cognition   Cognition  Overall Cognitive Status: Exceptions  Arousal/Alertness: Delayed responses to stimuli  Following Commands:  Follows one step commands with increased time  Safety Judgement: Decreased awareness of need for safety  Problem Solving: Assistance required to correct errors made  Insights: Decreased awareness of deficits  Initiation: Requires cues for some  Sequencing: Requires cues for some  Cognition Comment: patient is blind and continuous cues needed due to visual deficits    Objective          AROM RLE (degrees)  RLE AROM: WFL  AROM LLE (degrees)  LLE AROM : WFL  Strength RLE  Strength RLE: WFL  Strength LLE  Strength LLE: WFL        Bed mobility  Supine to Sit: Stand by assistance(verbal cues, head of bed elevated)  Scooting: Stand by assistance(to EOB)  Comment: patient sitting up in bedside chair at end of session  Transfers  Sit to Stand: Contact guard assistance(from EOB, from bedside chair and from toilet)  Stand to sit: Contact guard assistance(to multiple surfaces as outlined above)  Comment: verbal cues for all tasks due to visual deficits  Ambulation  Ambulation?: Yes  Ambulation 1  Surface: level tile  Device: Hand-Held Assist  Other Apparatus: (wound vac)  Assistance: Contact guard assistance  Quality of Gait: gait fairly steady with no overt loss of balance noted, decreased step length/height bilaterally with shuffling steps, decreased tess  Distance: 100' in room and hallways returning to bedside chair, 20' into bathroom, 20' back to bedside chair  Comments: HHA and verbal cues throughout due to visual deficits  Stairs/Curb  Stairs?: No     Balance  Sitting - Static: Fair;+(supervision to sit EOB)  Sitting - Dynamic: Fair(CGA when applying slippers at EOB)  Standing - Static: Fair  Standing - Dynamic: Fair(CGA to ambulate with HHA)        Plan   Plan  Times per week: 2-5  Current Treatment Recommendations: Strengthening, Transfer Training, Endurance Training, Patient/Caregiver Education & Training, Balance Training, Gait Training, Functional Mobility Training, Stair training, Safety Education & Training  Safety Devices  Type of devices: Gait belt, Nurse notified, Call light within reach, Left in chair, Chair alarm in place    OutComes Score                                                  AM-PAC Score  AM-PAC Inpatient Mobility Raw Score : 18 (10/27/20 1657)  AM-PAC Inpatient T-Scale Score : 43.63 (10/27/20 1657)  Mobility Inpatient CMS 0-100% Score: 46.58 (10/27/20 1657)  Mobility Inpatient CMS G-Code Modifier : CK (10/27/20 1657)          Goals  Short term goals  Time Frame for Short term goals: discharge  Short term goal 1: patient will perform bed mobility with supervision  Short term goal 2: patient will perform transfers sit<>stand with supervision  Short term goal 3: patient will ambulate 150' without an assistive device with supervision and verbal cues for visual deficits  Short term goal 4: patient will ascend/descend 8 steps with unilateral handrail and SBA with verbal cues for visual deficits  Patient Goals   Patient goals : to go home       Therapy Time   Individual Concurrent Group Co-treatment   Time In 1500         Time Out 1559         Minutes 59         Timed Code Treatment Minutes: 44 Minutes    Timed Code Treatment Minutes:  44 Minutes    Total Treatment Minutes:    44 minutes treatment + 15 minutes evaluation = 59 total treatment minutes    If patient discharges prior to next session this note will serve as a discharge summary. Please see below for the latest assessment towards goals.    Brook Gonzales, PT

## 2020-10-27 NOTE — PROGRESS NOTES
Patient is alert to self and situation, disoriented to time and place. Patient is blind and hard of hearing per baseline. Patient voiding per urinal. Patient has drainage from thigh dressing and bilateral ears. Contacted surgical residents to round at bedside. MDs stated that it was normal. Patient is resting in bed at this time with all fall precautions in place. Will continue to monitor.

## 2020-10-27 NOTE — PLAN OF CARE
Problem: Falls - Risk of:  Goal: Will remain free from falls  Description: Will remain free from falls  Outcome: Ongoing     Problem: Skin Integrity:  Goal: Will show no infection signs and symptoms  Description: Will show no infection signs and symptoms  Outcome: Ongoing     Problem: Infection:  Goal: Will remain free from infection  Description: Will remain free from infection  Outcome: Ongoing

## 2020-10-27 NOTE — CONSULTS
Department of Podiatry Consult Note  Resident       Reason for Consult: Diabetic nails and calluses  Requesting Physician: Karin Salamanca MD    CHIEF COMPLAINT: Painful nails and calluses    HISTORY OF PRESENT ILLNESS:                The patient is a 76 y.o. male with significant past medical history as linked below. Podiatry was consulted for diabetic nails and calluses. Patient reports that his nails are thickened and elongated and causing pain and he cannot trim them. Patient also reports that he has a callus to his left foot that his podiatrist normally trims but he has not been able to see his podiatrist lately and the callus is bothering him. Patient reports that he [place] podiatrist [place] the [place] of [facility] Dr. Dorinda Eisenmenger. Patient denies fever, chills, nausea, vomiting, shortness of breath, chest pain. Patient has no other pedal complaints at this time.     Past Medical History:        Diagnosis Date    Anxiety     Blind     Blood disorder     [address] [address]     chronic lymphoid leukemia    Diabetes mellitus ([facility] [address]     type 2    Encounter for long-term (current) use of high-risk medication 2/8/2016    Glaucoma     [place] (hard of hearing)     wears hearing aid    Hyperlipidemia     Hypertension     Sleep apnea     CPAP    Wound, open     open draining chest wound w/ dsd     Past Surgical History:        Procedure Laterality Date    APPENDECTOMY      COLONOSCOPY      EXTERNAL EAR SURGERY N/A 10/26/2020    TOTAL LEFT AURICULECTOMY, RIGHT AURICLE BIOPSY, RIGHT PARTIAL AURICULECTOMY,  RESECTION OF ANTERIOR CHEST WALL LESION, RECONSTRUCTION WITH SKIN GRAFT, LOCAL TISSUE FLAPS performed by Marcus Leger MD at [place] [address]  2006    HAND SURGERY Left 10/16/14    excision left thumb lesion with skin graft/trigger finger release left thumb and 5th finger    LYMPH NODE DISSECTION  2008    PRE-MALIGNANT / BENIGN SKIN LESION EXCISION      : excision 3.4 cm right shoulder lesion and 1.7 cm incisional biopsy left thumb    SKIN BIOPSY N/A 10/26/2020    EXCISION OF FUNGATING CHEST SQUAMOUS CELL CARCINOMA, SPLIT THICKNESS SKIN GRAFT FOR RECONSTRUCTION OF CHEST, POSSIBLE TEMPOPARIETAL MUSCLE FLAP FOR AURICULAR RECONSTRUCTION, SPLIT THICKNESS GRAFT performed by Kelsi Finley MD at [address] 10/26/2020    .  performed by Kelsi Finley MD at [address] TUNNELED VENOUS PORT PLACEMENT Left 2006    Removed on 5/5/2020 by Dr. Rita Blank TUNNELED VENOUS PORT PLACEMENT  2006    TUNNELED VENOUS PORT PLACEMENT Right 05/05/2020    SmartPort; RIJ access; cut at 25 cm; Dr. Odalys Slater     Current Medications:    Current Facility-Administered Medications: sodium phosphate 20 mmol in dextrose 5 % 250 mL IVPB, 20 mmol, Intravenous, Once  magnesium sulfate 4 g in 100 mL IVPB premix, 4 g, Intravenous, Once  0.9 % sodium chloride infusion, , Intravenous, Continuous  0.9 % sodium chloride bolus, 500 mL, Intravenous, Once  [START ON 10/28/2020] heparin (porcine) injection 5,000 Units, 5,000 Units, Subcutaneous, 3 times per day  venlafaxine (EFFEXOR) tablet 37.5 mg, 37.5 mg, Oral, Daily  sodium chloride flush 0.9 % injection 10 mL, 10 mL, Intravenous, 2 times per day  sodium chloride flush 0.9 % injection 10 mL, 10 mL, Intravenous, PRN  ondansetron (ZOFRAN-ODT) disintegrating tablet 4 mg, 4 mg, Oral, Q8H PRN **OR** ondansetron (ZOFRAN) injection 4 mg, 4 mg, Intravenous, Q6H PRN  oxyCODONE (ROXICODONE) immediate release tablet 5 mg, 5 mg, Oral, Q4H PRN **OR** oxyCODONE (ROXICODONE) immediate release tablet 10 mg, 10 mg, Oral, Q4H PRN  dextrose 50 % IV solution, 12.5 g, Intravenous, PRN  insulin regular (HUMULIN R;NOVOLIN R) injection 0-15 Units, 0-15 Units, Subcutaneous, 4x Daily AC & HS  labetalol (NORMODYNE;TRANDATE) injection syringe 5 mg, 5 mg, Intravenous, Q4H PRN  acetaminophen (TYLENOL) tablet 1,000 mg, 1,000 mg, Oral, Q6H  glucose (GLUTOSE) 40 % oral gel 15 g, 15 g, Oral, PRN  dextrose 50 % IV solution, 12.5 g, Intravenous, PRN  glucagon (rDNA) injection 1 mg, 1 mg, Intramuscular, PRN  dextrose 5 % solution, 100 mL/hr, Intravenous, PRN  Facility-Administered Medications Ordered in Other Encounters: 0.9 % sodium chloride bolus, 250 mL, Intravenous, PRN  heparin flush 100 UNIT/ML injection 500 Units, 500 Units, Intercatheter, PRN  sodium chloride (PF) 0.9 % injection 20 mL, 20 mL, Intravenous, PRN  Allergies:   Patient has no known allergies. Social History:    TOBACCO:   reports that he has never smoked. He has never used smokeless tobacco.  ETOH:   reports current alcohol use. DRUGS:   reports no history of drug use. Family History:       Problem Relation Age of Onset    Breast Cancer Sister     Coronary Art Dis Mother     Diabetes Mother     Elevated Lipids Mother     Hypertension Mother     Obesity Mother     Obesity Brother      REVIEW OF SYSTEMS:    As above  PHYSICAL EXAM:      Vitals:    /79   Pulse 107   Temp 98.6 °F (37 °C) (Oral)   Resp 18   Ht 5' 4\" (1.626 m)   Wt 161 lb (73 kg)   SpO2 94%   BMI 27.64 kg/m²     LABS:   Recent Labs     10/26/20  0702 10/27/20  0504   WBC 28.2* 27.5*   HGB 12.1* 9.8*   HCT 35.5* 29.0*   * 92*     Recent Labs     10/27/20  0504   *   K 4.0      CO2 20*   PHOS 1.7*   BUN 16   CREATININE 1.4*     Recent Labs     10/26/20  0702   PROT 6.4         VASCULAR: DP and PT pulses palpable 2/4B/L. CFT is brisk to the digits of the foot b/l. Skin temperature is warm to cool from proximal to distal with no focal calor noted. No edema noted. No pain with calf compression b/l. NEUROLOGIC: Gross and epicritic sensation is diminished b/l. Protective sensation is diminished at all pedal sites b/l. DERMATOLOGIC: Nails 1-5 b/l are thickened, elongated, and discolored. Subungual debris to nails 1-5 bilateral. Webspaces 1-4 b/l are clean, dry, and intact. Hyperkeratotic lesion noted to plantar medial left midfoot. No open wounds noted. No subcutaneous nodules, rashes, or other skin lesions noted. MUSCULOSKELETAL: Muscle strength is 5/5 for all pedal groups tested. No pain with palpation of the foot or ankle b/l. Ankle joint ROM is decreased in dorsiflexion with the knee extended. Pes planus noted bilateral.      IMPRESSION/RECOMMENDATIONS:      1. Onychomycosis nails 1 through 5 bilateral  2. Corns and callosities, left foot  3. Type II DM with peripheral neuropathy    -Patient examined and evaluated at bedside   -VSS. Leukocytosis noted.  -Nails 1 through 5 bilateral sharply debrided with sterile nail nippers without incident.  -Using a #15 blade, excisional debridement of hyperkeratotic lesion down to dermis. No bleeding noted. Patient tolerated procedure well. -Instructed patient to check feet daily. Dispo: Onychomycosis, nails 1 through 5 bilateral; stable. No signs of acute infection at this time. Patient is able to be discharged from podiatric standpoint. Will sign off at this time. Please reconsult us if any other acute issue arises. - The patient will be staffed with Dr. Harsha Bell, D.P.M. Shaista Romero DPM  Podiatric Resident, PGY-1  Pager #: [phone] or PerfectServe    Patient was seen and evaluated at bedside. Agree with residents assessment and treatment plan.   Harsha eBll DPM

## 2020-10-27 NOTE — CARE COORDINATION
KALEB paged. Dr. Jodeen Spatz at 7:49 AM to see if Red Team has completed the wound Vac order form so SW can submit for a wound vac for patient at discharge. Patient may also need home health care for vac changes and support. KALEB to discuss with team.     SW to have team AMEND home health care orders to reflect   \"Skilled Nursing for management of Wound Vac to anterior chest wall\". UPDATE: 3:55 pm. Patient needs letter from Dr. Maira Ruiz for [employer] to approve vac.  Azeb with Surgery aware and working to [address] [place] [address]  Social Work/Case Management  [facility] [employer]   [phone]

## 2020-10-27 NOTE — PROGRESS NOTES
Surgery Daily Progress Note      CC: fungating chest wall SCC    SUBJECTIVE:  Patient rested well overnight with no acute events. Pain is controlled. States he hasn't eaten yet. ROS:   A 14 point review of systems was conducted, significant findings as noted above. All other systems negative. OBJECTIVE:    PHYSICAL EXAM:  Vitals:    10/26/20 1545 10/26/20 1600 10/26/20 1653 10/26/20 1907   BP: (!) 96/51 (!) 97/50 104/62 103/62   Pulse: 93 95 87 88   Resp: 22 24 20 18   Temp:  99.1 °F (37.3 °C) 99.7 °F (37.6 °C) 98.3 °F (36.8 °C)   TempSrc:  Temporal Axillary Oral   SpO2: 97% 95% 95% 94%   Weight:       Height:           Exam:General appearance: alert, appears stated age and cooperative  Lungs: clear to auscultation bilaterally  Heart: regular rate and rhythm, S1, S2 normal, no murmur, click, rub or gallop  Abdomen: soft, obese, non-tender; no masses,  no organomegaly  ENT:  Right ear oozy. Left ear also oozy. Sutures intact. No active signs of bleeding. Wound vac present on chest.  No active signs of bleeding  LLE:  Skin graft area on left upper thigh. Tegaderm in place. Graft site oozy but not actively bleeding    ASSESSMENT & PLAN:   Pt is a 76year old male s/p excision of fungating chest wall SCC with skin graft and bilateral partial auriculectomy with tissue treatment POD #1    - Social work for home wound vac  - Continue Carb control diet  - SLIV  - Regular diet  - Oral pain medications  - Resume home medications  - Up and out of bed ambulating  - Will discuss discharge today vs tomorrow pending discussion with staff and wound vac approval       Sheryl Jesus DO  10/27/20  6:58 AM  [phone]    I saw and independently examined the patient today. I agree with the history of present illness, past medical/surgical histories, family history, social history, medication list and allergies as listed. The review of systems is as noted above. My physical exam confirms the findings listed above.  Review of labs, pathology reports, radiology reports and medical records confirm the findings noted above. I edited the note where appropriate in italics, strikethrough font, or underline. Doing well overall with assistance. Pain control has been fair. Anxious about going home, but reassurred today. Will plan for DC with home nursing for wound care given blindness as well as home wound vac (not to be changed for his skin graft). Will consult Podiatry for assistance with foot care per patient/family recommendations. Tip Gill MD  [address] [place] [address] [address] Reconstructive Surgery  [phone]  10/27/20     ADDENDUM    Patient requires wound vac for bolster of skin graft. Patient's SCC has been treated with excision and skin grafting.     Rk Patel

## 2020-10-27 NOTE — OP NOTE
[place] [address] SURGERY     OPERATIVE DICTATION    NAME: Princess Barry   MRN: [id number]  DATE: 10/26/2020    AGE: 76 y.o. LOCATION: [facility]    PREOPERATIVE DIAGNOSIS:  Fungating, metastatic squamous cell carcinoma. POSTOPERATIVE DIAGNOSIS:  Same. OPERATION PERFORMED: 1) Excision of fungating chest squamous cell carcinoma (20 x 11.5 cm)       2) Application of split thickness skin graft to chest (20 x 11.5 cm)       3) Application of wound vacuum device       4) Excision of right lateral chest lesion (2.5 x 1 cm)       5) Complex closure of right chest (3.6 cm)       6) Left auricular reconstruction with scalp advancement flap (wound: 2 x 5 cm; flap: 3.5 x 5 cm)       7) Application of split thickness skin graft to scalp (2.5 x 1.5 cm)     SURGEON:  Prieto Stern MD    SURGEON2: Jannet Mckeon MD     ANESTHESIA:  General     ESTIMATED BLOOD LOSS:  100cc     DRAINS:  Wound vac     SPECIMENS: Chest SCC, right chest wall lesion (from plastics)     OPERATIVE INDICATIONS:  This is an unfortunate 76 y.o. male with a complex oncologic history. He was found to have multiple lesions of the head and neck that have been growing for at least 1 year with recent rapid growth. He was seen by dermatology and underwent Mohs excision, but had near immediate return. He developed a fungating left auricular lesion as well as recurrence of his chest.  He has a history of CLL receiving chemo and radiation to the neck. He is blind and also has bulky lymph nodes that have grown, despite chemotherapy. Plastic surgery was consulted for assistance for palliation of his chest cancer as well as assistance with coverage of his left auriculectomy defect. The patient desired excision and a thorough discussion regarding the risks, benefits, alternatives, outcomes, and personnel involved was performed with the patient.   After all questions were answered to the patient's satisfaction, they agreed to proceed. OPERATIVE PROCEDURE:  The patient was marked in the preoperative holding area and then brought to the operating room and placed in the supine position on the operating room table. He underwent general anesthesia without difficulty. The patient was prepped and draped in the usual sterile manner. A time-out was performed confirming the patient and the procedure to be performed. Dr. Allen Mtz began by performing his partial auriculectomy on the left as dictated in a separate operative dictation. I began by infiltrating 1% lidocaine with epinephrine around the periphery of the skin cancer. An incision was then performed removing the mass with electrocautery onto the pectoralis major muscle with margins. The tissue was then oriented and sent to pathology as specimen. Hemostasis was obtained with electrocautery. Attention was then directed to the left thigh and a split thickness skin graft was harvested with a Osmani dermatome. The graft was meshed in a 3:1 manner and sutured into position using 4-0 Chromic sutures. The donor site had hemostasis obtained with epinephrine soaked Telfa and postoperative analgesia with Exparel. A Xeroform dressing was then sutured into position with 4-0 Chromic sutures. The graft was then protected with a vac bolster with excellent seal.  There was a lateral chest wall defect also palpated that was excised with a 15 blade in an elliptical manner down to the pectoralis muscle. This was then oriented and sent to pathology as specimen. The wound had hemostasis obtained with electrocautery and was then widely undermined and closed without tension using 3-0 Monocryl sutures in layers. Attention was then directed to the left auriculectomy defect. As there was no bony exposure, a temporoparietal flap was not performed. The cartilage was cut with Iris scissors and the posterior scalp widely undermined.  To allow for a hearing device, the cartilage around the bowl was kept in

## 2020-10-28 NOTE — CARE COORDINATION
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]   Date / Time: 10/26/2020  5:40 AM    Current PCP: Riri Henriquez MD  Clinic patient: No    Hospitalization in the last 30 days: Yes    Advance Directives:  Code Status: Full Code  [place] DNR form completed and on chart: Not Indicated    Financial:  Payor: Carrie How / Plan: [address] [place] [address] 305 / Product Type: *No Product type* /      Pharmacy:    [address] [place] [address], [address] [place] [address] (AT [address] [place] [address]) - P [phone] - F [phone]  [address] [facility] (at [place])  [address] [place] [address]  Phone: [phone] Fax: [phone]    [facility] [address]75, [address] [facility] [phone] - F [phone]  [address] [facility]  [address] [address]  Phone: [phone] Fax: Brittany Cardona [address] [facility] [phone] Linda Garcia [phone] [address] [facility] [address]  Phone: [phone] Fax: [phone]    [address] - [address] [phone] Linda Garcia [phone]  [address] [facility] [address]  Phone: [phone] Fax: [phone]      Assistance purchasing medications?:    Assistance provided by Case Management: None at this time    Does patient want to participate in local refill/ meds to beds program?:      Meds To Beds General Rules:  1. Can ONLY be done Monday- Friday between 8:30am-5pm  2. Prescription(s) must be in pharmacy by 3pm to be filled same day  3. Copy of patient's insurance/ prescription drug card and patient face sheet must be sent along with the prescription(s)  4. Cost of Rx cannot be added to hospital bill. If financial assistance is needed, please contact unit  or ;  or  CANNOT provide pharmacy voucher for patients co-pays  5. Patients can then  the prescription on their way out of the hospital at discharge, or pharmacy can deliver to the bedside if staff is available. (payment due at time of pick-up or delivery - cash, check, or card accepted)     Able to afford home medications/ co-pay costs: No    ADLS:  Current PT AM-PAC Score: 18 /24  Current OT AM-PAC Score: 18 /24      Discharge Disposition: Home with [address]: Therapy, Skilled nursing      LOC at discharge: Skilled  [address] Completed: Yes    Notification completed in HENS/PAS?:  No    IMM Completed:   No    Transportation:  Transportation Plan for discharge: family   Mode of Transport: [address]  ordered at discharge: Yes  [address]: [facility]   Phone: [phone]  Fax: [phone]  Orders faxed: Yes    Durable Medical Equipment:  DME Provider: [address] [place] [address] obtained during hospitalization: Wound Vac     Home Oxygen and Respiratory Equipment:  Oxygen needed at discharge?: Not Indicated    Dialysis:  Dialysis patient: No    Dialysis Center:  Not Applicable    Referrals made at [facility] for outpatient continued care:  Not Applicable    Additional CM Notes:   Patient is medically ready for discharge. KALEB spoke with patient and spouse at bedside this morning. KALEB as able to confirm [facility] for home care and SW faxed orders. Patient to discharge once wound vac. Arrives and is in place. No other home care needs reported from patient or spouse.      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  Yes    Care Transitions patient: No    JUDY Mcclelland  [facility] [employer]  Case Management Department  Ph: [phone]

## 2020-10-28 NOTE — DISCHARGE INSTR - COC
Continuity of Care Form    Patient Name: Keiko Fried   :  1945  MRN:  [id number]    Admit date:  10/26/2020  Discharge date:  ***    Code Status Order: Full Code   Advance Directives:   Advance Care Flowsheet Documentation       Date/Time Healthcare Directive Type of Healthcare Directive Copy in [address] Agent's Name Healthcare Agent's Phone Number    10/26/20 3129  Yes, patient has an advance directive for healthcare treatment --  No, copy requested from family -- -- --    10/22/20 1441  Yes, patient has an advance directive for healthcare treatment --  -- -- -- --            Admitting Physician:  Jarad Rossi MD  PCP: Irvin Sage MD    Discharging Nurse: [facility] Unit/Room#: 0659/4093-13  Discharging Unit Phone Number: ***    Emergency Contact:   Extended Emergency Contact Information  Primary Emergency Contact: [facility]  Address: [address] [place] [address] Phone: [phone]  Mobile Phone: [phone]  Relation: Spouse  Secondary Emergency Contact: Josey, [address] [address] Phone: [phone]  Relation: Child    Past Surgical History:  Past Surgical History:   Procedure Laterality Date    APPENDECTOMY      COLONOSCOPY      EXTERNAL EAR SURGERY N/A 10/26/2020    TOTAL LEFT AURICULECTOMY, RIGHT AURICLE BIOPSY, RIGHT PARTIAL AURICULECTOMY,  RESECTION OF ANTERIOR CHEST WALL LESION, RECONSTRUCTION WITH SKIN GRAFT, LOCAL TISSUE FLAPS performed by Jarad Rossi MD at [place] [address]  2006    HAND SURGERY Left 10/16/14    excision left thumb lesion with skin graft/trigger finger release left thumb and 5th finger    LYMPH NODE DISSECTION  2008    PRE-MALIGNANT / BENIGN SKIN LESION EXCISION      : excision 3.4 cm right shoulder lesion and 1.7 cm incisional biopsy left thumb    SKIN BIOPSY N/A 10/26/2020    EXCISION OF FUNGATING CHEST SQUAMOUS CELL CARCINOMA, SPLIT THICKNESS SKIN GRAFT FOR RECONSTRUCTION OF CHEST, POSSIBLE TEMPOPARIETAL MUSCLE FLAP FOR AURICULAR RECONSTRUCTION, SPLIT THICKNESS GRAFT performed by Sarita Bean MD at [address] N/A 10/26/2020    .  performed by Sarita Bean MD at [address] TUNNELED VENOUS PORT PLACEMENT Left 2006    Removed on 5/5/2020 by Dr. Richard Manzo TUNNELED VENOUS PORT PLACEMENT  2006    TUNNELED VENOUS PORT PLACEMENT Right 05/05/2020    SmartPort; RIJ access; cut at 25 cm; Dr. 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    Encounter for long-term (current) use of high-risk medication Z79.899    Chronic lymphocytic leukemia of B-cell type in relapse ([facility] C91.12    High risk medication use Z79.899    Fever of unknown origin R50.9    DMII (diabetes mellitus, type 2) ([facility] E11.9    Hypertension I10    Hyperlipidemia E78.5    Pneumonia due to organism J18.9    CAP (community acquired pneumonia) J18.9    Status post chemotherapy Z92.21    CLL (chronic lymphocytic leukemia) ([facility]) C91.10    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 D04.5       Isolation/Infection:   Isolation            No Isolation          Patient Infection Status       None to display            Nurse Assessment:  Last Vital Signs: /70   Pulse 95   Temp 97.7 °F (36.5 °C) (Oral)   Resp 16   Ht 5' 4\" (1.626 m)   Wt 161 lb (73 kg)   SpO2 94%   BMI 27.64 kg/m²     Last documented pain score (0-10 scale): Pain Level: 0  Last Weight:   Wt Readings from Last 1 Encounters:   10/26/20 161 lb (73 kg)     Mental Status:  {IP PT MENTAL STATUS:}    IV Access:  { AUGUSTO IV ACCESS:230161534}    Nursing Mobility/ADLs:  Walking   {CHP DME SUXU:990261159}  Transfer  {CHP DME BJFZ:229687377}  Bathing  {CHP DME RQIT:405485179}  Dressing  {CHP DME GBKW:729704910}  Toileting  {CHP DME JVES:615854063}  Feeding  {P DME EZZT:862200304}  Med Admin  {P DME OKT}  Med Delivery   { AUGUSTO MED Delivery:465524899}    Wound Care Documentation and Therapy:  Negative Pressure Wound Therapy Chest Mid;Upper (Active)   Wound Type Surgical 10/28/20 0707   Unit Type prevena  10/26/20 1706   Dressing Type Black foam 10/28/20 0707   Cycle Continuous 10/28/20 0707   Target Pressure (mmHg) 125 10/28/20 0707   Canister changed? No 10/28/20 0707   Dressing Status Clean;Dry; Intact 10/28/20 0707   Dressing Changed Changed/New 10/27/20 1500   Drainage Amount None 10/28/20 0707   Output (ml) 0 ml 10/28/20 0707   Wound Assessment Other (Comment) 10/28/20 0707   Rose-wound Assessment Other (Comment) 10/28/20 0707   Odor None 10/28/20 0707   Number of days: 1        Elimination:  Continence:   · Bowel: Yes  · Bladder: Yes  Urinary Catheter: None   Colostomy/Ileostomy/Ileal Conduit: No       Date of Last BM: ***    Intake/Output Summary (Last 24 hours) at 10/28/2020 1059  Last data filed at 10/28/2020 0707  Gross per 24 hour   Intake 1956.21 ml   Output 0 ml   Net 1956.21 ml     I/O last 3 completed shifts: In: 2316.2 [P.O.:840; I.V.:1476.2]  Out: 0     Safety Concerns: At Risk for Falls    Impairments/Disabilities:      Vision and Hearing    Nutrition Therapy:  Current Nutrition Therapy:   - Oral Diet:  Carb Control 5 carbs/meal (2000kcals/day)    Routes of Feeding: Oral  Liquids:  Thin Liquids  Daily Fluid Restriction: no  Last Modified Barium Swallow with Video (Video Swallowing Test): not done    Treatments at the Time of Hospital Discharge:   Respiratory Treatments:     Oxygen Therapy:  is not on home oxygen therapy. Ventilator:    - No ventilator support    Rehab Therapies: Physical Therapy and Occupational Therapy  Weight Bearing Status/Restrictions: No weight bearing restirctions  Other Medical Equipment (for information only, NOT a DME order):  {EQUIPMENT:484287371}  Other Treatments: wound vac    Patient's personal belongings (please select all that are sent with patient):  Hearing Aides left    RN SIGNATURE:  Electronically signed by Javier Kwan on 10/28/20 at 3:53 PM EDT    CASE MANAGEMENT/SOCIAL WORK SECTION    Inpatient Status Date: 10/26/2020    Readmission Risk Assessment Score:  Readmission Risk              Risk of Unplanned Readmission:        15           Discharging to Facility/ [address] [place] [address]   Phone: [phone]   Fax: [phone]    / signature: Electronically signed by JUDY Rojo on 10/28/20 at 11:00 AM EDT    PHYSICIAN SECTION    Prognosis: Fair    Condition at Discharge: Stable    Rehab Potential (if transferring to Rehab): Good    Recommended Labs or Other Treatments After Discharge: Patient will need PT/OT 3-5 times a week    Physician Certification: I certify the above information and transfer of Princess Barry  is necessary for the continuing treatment of the diagnosis listed and that he requires [address] for less 30 days. Update Admission H&P: No change in H&P    PHYSICIAN SIGNATURE:  Electronically signed by ESTHER Webber CNP on 10/28/20 at 11:08 AM EDT    Patient has a wound vac to his chest.  DO NOT REMOVE OR CHANGE THE WOUND VAC.   This is to remain in place until his follow up appointment with Dr. Meredith Botello which as been made for Monday November 2, 2020 at 2:00pm.  Please contact the office at [phone] if this needs to be rescheduled    Bacitracin needs to be applied to ears daily

## 2020-10-28 NOTE — PROGRESS NOTES
Patient is a/o x4. Patient denies c/o nausea/pain. VSS. Non-skid socks on, SCD'S remain in place. Patient x 1 SBA w/gait belt to bathroom, return to bed, tolerates well. Patient voiding adequately. Surgical site to L thigh having scant-small serosanguinous drainage. Dressing reinforced w/new tegaderm. Surgical sites t obilateral ears clean/dry/intact, w/minimal sanguinous drainage. Fall precautions in place, camera in use. Bed locked and in lowest position, bedside table and nurse call light within reach. Instructed patient to call out for assistance. Patient remains free from falls at this time, will continue to monitor.

## 2020-10-28 NOTE — PROGRESS NOTES
Occupational Therapy   Occupational Therapy Initial Assessment  Date: 10/28/2020   Patient Name: Torres Fan  MRN: [id number]     : 1945    Date of Service: 10/28/2020    Discharge Recommendations: Torres Fan scored a 18/24 on the AM-PAC ADL Inpatient form. Current research shows that an AM-PAC score of 18 or greater is typically associated with a discharge to the patient's home setting. Based on the patient's AM-PAC score, and their current ADL deficits, it is recommended that the patient have 2-3 sessions per week of Occupational Therapy at d/c to increase the patient's independence. At this time, this patient demonstrates the endurance and safety to discharge home with home  services) and a follow up treatment frequency of 2-3x/wk. Please see assessment section for further patient specific details. If patient discharges prior to next session this note will serve as a discharge summary. Please see below for the latest assessment towards goals. HOME HEALTH CARE: LEVEL 1 STANDARD    - Initial home health evaluation to occur within 24-48 hours, in patient home   - Therapy to evaluate with goal of regaining prior level of functioning   - Therapy to evaluate if patient has [address] needs for personal care         Assessment   Performance deficits / Impairments: Decreased ADL status  Assessment: Pt from home w/ wife, both are blind. Prior to admission pt was independent w/ ADLs and functional mobility. Pt requiring CGA and max verbal cues for functional mobility d/t visual deficits and being in an unfamiliar environment. Pt requiring total assist to don socks and Max A to don underwear seated EOB and in chair, w/ pt reporting he usually complete LE dressing in standing at home. Pt educated on completing LE dressing while seated for safety. Pt plans to d/c home w/ wife and [facility] nurse. Wife states she can help w/ ADLs. Recommend home w/ 24 hr assist and home OT at d/c.   Treatment Diagnosis: Impaired ADLs  Prognosis: Good  Decision Making: Medium Complexity  OT Education: OT Role;Plan of Care;ADL Adaptive Strategies  Patient Education: Pt educated on completing LE dressing seated to increase safety. Pt educated on figure 4 method to assist in LE dressing. REQUIRES OT FOLLOW UP: Yes  Activity Tolerance  Activity Tolerance: Patient Tolerated treatment well  Activity Tolerance: At end of session pt's IV tape noted ot be coming loose, RN called and came to change IV. Upon IV removal pt started bleeding, assisted RN in applying pressure and cleaning pt up following placement of dressing. Safety Devices  Safety Devices in place: Yes  Type of devices: Left in chair;Call light within reach; Chair alarm in place;Nurse notified(RN in room at end of session)           Patient Diagnosis(es): The encounter diagnosis was SCC (squamous cell carcinoma), ear, left.     has a past medical history of Anxiety, Blind, Blood disorder, Cancer ([facility] [address] Diabetes mellitus ([facility] [address] Encounter for long-term (current) use of high-risk medication, Glaucoma, [place] (hard of hearing), Hyperlipidemia, Hypertension, Sleep apnea, and Wound, open. has a past surgical history that includes pre-malignant / benign skin lesion excision; Tunneled venous port placement (Left, 2006); Hand surgery (Left, 10/16/14); lymph node dissection (2008); fine needle aspiration (2006); Tunneled venous port placement (2006); Appendectomy; Tunneled venous port placement (Right, 05/05/2020); Colonoscopy; External ear surgery (N/A, 10/26/2020); skin biopsy (N/A, 10/26/2020); and Skin graft (N/A, 10/26/2020).     Treatment Diagnosis: Impaired ADLs      Restrictions  Position Activity Restriction  Other position/activity restrictions: up as tolerated, ambulate patient, wound vac    Subjective   General  Chart Reviewed: Yes  Patient assessed for rehabilitation services?: Yes  Additional Pertinent Hx: Pt is 75 y/o M admitted for excision of fungating chest squamous cell carcinoma and left auricular reconstruction. Hospital course: CT IAC posterior fossa w/ soft tissue nodule/mass involving auricle of L ear, consistent w/ history of L ear squamous cell carcinoma, no extension into external auditory canal, suspected cerumen w/in L external auditory canal; CTA neck w/ L ear auricle and anterior upper chest wall cutaneous soft tissue masses consistent w/ hx of squamous cell carcinoma. PMH significant for anxiety, blind, blood disorder, cancer, diabetes mellitus, Encounter for long-term (current) use of high-risk medication, glaucoma, [place], hyperlipidemia, HTN, sleep apnea, wound, open. Family / Caregiver Present: Yes(Wife at bedside)  Referring Practitioner: Gordon Veloz DO  Diagnosis: SCC    Social/Functional History  Social/Functional History  Lives With: Spouse  Type of Home: (condo)  Home Layout: One level  Home Access: Stairs to enter with rails  Entrance Stairs - Number of Steps: 8  Entrance Stairs - Rails: Both  Bathroom Shower/Tub: Walk-in shower  Bathroom Toilet: Standard  Bathroom Equipment: Shower chair  Home Equipment: Cane(cane for visual deficits)  ADL Assistance: Independent  Homemaking Assistance: (wife performs, has assistance 1x/week for cleaning)  Homemaking Responsibilities: No  Ambulation Assistance: Independent  Transfer Assistance: Independent  Active : No  Additional Comments: patient has groceries delivered; reports last fall two years ago       Objective        Orientation  Overall Orientation Status: Within Functional Limits     Balance  Sitting Balance: Stand by assistance  Standing Balance: Stand by assistance  Standing Balance  Time: x3 minutes total  Activity: transfers, washing hands at sink, pulling up underwear  Functional Mobility  Functional - Mobility Device: Other(gait belt; Max verbal cues for direction d/t vision impairment)  Activity: To/from bathroom  Assist Level: Contact guard assistance(CGA to help direct.  Pt moves quickly,

## 2020-10-28 NOTE — CARE COORDINATION
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]     ________________________________________________________________________________________  Discharge Plan: Home with [address]: [facility]  Both patient and spouse are blind, Patient is also hard of hearing     Insurance: Payor: [employer] / Plan: [address] [place] [address] / Product Type: *No Product type* /   Is pre-cert/notification needed: yes     Tentative discharge date: 10/28 vs 10/29     Current barriers: Wound Vac Approval     Referrals completed: DME: KC[facility]EREL and [address] [facility]: [facility]    Resources/ information provided: [address] [facility] List   ________________________________________________________________________________________  PT AM-PAC: 18 / 24 per last evaluation on: 10/27    OT AM-PAC:   / 24 per last evaluation on:     DME Needs for discharge: None   ________________________________________________________________________________________  Notes/Plan of Care:   SW reviewed chart on this date. Possible discharge today pending improvement and if Vac approved by insurance. KALEB sent Dr. Charli shah to [facility] per request to authorize Wound Vac based on criteria. Fax sent at 7:20 AM. KALEB to await response. KALEB spoke with Steve St with [facility] yesterday. They are reviewing patient. SW to follow. RONNELL and/or his family were provided with choice of provider; he and/or his family are in agreement with the discharge plan at this time.     Care Transition Patient: JUDY Earl  [facility]    Case Management Department  Ph: [phone]

## 2020-10-28 NOTE — PROGRESS NOTES
CLINICAL PHARMACY NOTE: MEDS TO [address] Select Patient?: No  Total # of Prescriptions Filled: 1   The following medications were delivered to the patient:  · Zofran  Total # of Interventions Completed: 0  Time Spent (min): 15    Additional Documentation:

## 2020-10-28 NOTE — PROGRESS NOTES
Physical Therapy  Facility/Department: [facility] 5T ORTHO/NEURO  Daily Treatment Note  NAME: Magnus Zimmerman  : 1945  MRN: [id number]    Date of Service: 10/28/2020    Discharge Recommendations:    Magnus Zimmerman scored a 18/24 on the AM-PAC short mobility form. Current research shows that an AM-PAC score of 18 or greater is typically associated with a discharge to the patient's home setting. Based on the patient's AM-PAC score and their current functional mobility deficits, it is recommended that the patient have 2-3 sessions per week of Physical Therapy at d/c to increase the patient's independence. At this time, this patient demonstrates the endurance and safety to discharge home with 24hr A and HHPT and a follow up treatment frequency of 2-3x/wk. Please see assessment section for further patient specific details. If patient discharges prior to next session this note will serve as a discharge summary. Please see below for the latest assessment towards goals. PT Equipment Recommendations  Equipment Needed: No    Assessment   Body structures, Functions, Activity limitations: Decreased functional mobility ; Decreased balance;Decreased vision/visual deficit; Decreased endurance  Assessment: Pt requiring CGA/SBA for transfers and CGA with HHA for amb with VC for spatial assist 2/2 visual impairment. Pt up/down 12 stairs with LHR and CGA with VC for spatial assist 2/2 visual assist. Pt planning to d/c home with 24hr A from wife and HHPT. Will continue to follow.   Treatment Diagnosis: impaired mobility associated with squamous cell carcinoma  PT Education: Goals;Transfer Training;PT Role;Functional Mobility Training;Plan of Care;General Safety;Gait Training  Patient Education: patient verbalized understanding  Barriers to Learning: impaired hearing and vision  REQUIRES PT FOLLOW UP: Yes  Activity Tolerance  Activity Tolerance: Patient Tolerated treatment well     Patient Diagnosis(es): The encounter diagnosis was bilaterally with shuffling steps, decreased tess  Distance: 150'  Comments: HHA and verbal cues throughout due to visual deficits  Stairs/Curb  Stairs?: Yes  Stairs  # Steps : 12  Stairs Height: 6\"  Rails: Left ascending  Device: No Device  Assistance: Contact guard assistance  Comment: VC to indicate start and end of stairs at top and bottom          Goals  Short term goals  Time Frame for Short term goals: discharge  Short term goal 1: patient will perform bed mobility with supervision  Short term goal 2: patient will perform transfers sit<>stand with supervision  Short term goal 3: patient will ambulate 150' without an assistive device with supervision and verbal cues for visual deficits  Short term goal 4: patient will ascend/descend 8 steps with unilateral handrail and SBA with verbal cues for visual deficits  Patient Goals   Patient goals : to go home    Plan    Plan  Times per week: 2-5  Current Treatment Recommendations: Strengthening, Transfer Training, Endurance Training, Patient/Caregiver Education & Training, Balance Training, Gait Training, Functional Mobility Training, Stair training, Safety Education & Training  Safety Devices  Type of devices: Gait belt, Nurse notified, Call light within reach, Left in chair, Chair alarm in place     Therapy Time   Individual Concurrent Group Co-treatment   Time In 1410         Time Out 1425         Minutes 15           Timed Code Treatment Minutes:  15    Total Treatment Minutes:  15    If the patient is discharged before the next treatment session, this note will serve as the discharge summary.      Ibeth Ricketts, PT, DPT

## 2020-10-28 NOTE — PLAN OF CARE
Problem: Falls - Risk of:  Goal: Will remain free from falls  Description: Will remain free from falls  10/28/2020 1007 by Saira Herrmann  Outcome: Ongoing   Fall risk precautions in place. Bed is locked and in lowest position. 2/4 side rails up. Fall risk bracelet in place. Frequent checks on patient. Call light within reach. Free from falls at this time. Will continue to monitor. Problem: Pain:  Goal: Patient's pain/discomfort is manageable  Description: Patient's pain/discomfort is manageable  10/28/2020 1007 by Saira Herrmann  Outcome: Ongoing   Pt has no c/o pain at this time. PRN pain medication available per eMAR. Pt able to utilize 0-10 pain rating scale and calls out appropriately when in pain. Will continue to assess and intervene as needed this shift.

## 2020-10-28 NOTE — PLAN OF CARE
Problem: Falls - Risk of:  Goal: Will remain free from falls  Description: Will remain free from falls  10/27/2020 2206 by Paloma Hunt RN  Outcome: Ongoing  Note: Hourly rounding on patient for needs. Non-skid socks on, bed in lowest position and locked. Bedside table, personal belongs, and nurse call light within reach. Instructed patient to use call light for assistance. Bed alarm on, camera in use for safety. Floor clear of clutter. Patient remains free of falls at this time. Will continue to monitor. Problem: Infection:  Goal: Will remain free from infection  Description: Will remain free from infection  Outcome: Ongoing  Note: Unable to assess surgical sites to chest and thigh due to dressing, dressing  to chest is clean/dry/intact. No odor or drainage noted. Dressing to L thigh is intact, with scant serosanguinous drainage. Surgical sites to bilateral ears are intact, open to air. Patient remains afebrile at this time. Will continue to monitor. Problem: Pain:  Goal: Patient's pain/discomfort is manageable  Description: Patient's pain/discomfort is manageable  Outcome: Ongoing  Note: Patient denies pain at this time, patient exhibits no objective characteristics of pain, will continue to monitor.

## 2020-10-28 NOTE — CARE COORDINATION
SW received call from [facility]. Wound Vac approved and will be delivered today. [phone].        JUDY Portillo, [place]  Social Work/Case Management  [facility]   [phone]

## 2020-10-28 NOTE — PROGRESS NOTES
Surgery Daily Progress Note      CC: fungating chest wall SCC    SUBJECTIVE:  Patient rested well overnight with no acute events. Pain is controlled. ROS:   A 14 point review of systems was conducted, significant findings as noted above. All other systems negative. OBJECTIVE:    PHYSICAL EXAM:  Vitals:    10/27/20 1903 10/27/20 2300 10/28/20 0015 10/28/20 0215   BP: 111/71 109/70  116/71   Pulse: 92 86  82   Resp: 16 16 18 16   Temp: 97.9 °F (36.6 °C) 97.8 °F (36.6 °C)  97.9 °F (36.6 °C)   TempSrc: Oral Oral  Oral   SpO2: 94% 95%  96%   Weight:       Height:           Exam:General appearance: alert, appears stated age and cooperative  Lungs: clear to auscultation bilaterally  Heart: regular rate and rhythm, S1, S2 normal, no murmur, click, rub or gallop  Abdomen: soft, obese, non-tender; no masses,  no organomegaly  ENT:  Right and left ear hemostatic. Sutures intact. No active signs of bleeding. Wound vac present on chest.  No active signs of bleeding  LLE:  Skin graft area on left upper thigh. Tegaderm in place. Graft site hemostatic. ASSESSMENT & PLAN:   Pt is a 76year old male s/p excision of fungating chest wall SCC with skin graft and bilateral partial auriculectomy with tissue treatment POD #2    - Social work for home wound vac  - Follow up labs, if creatinine stable with SLIV  - Continue Carb control diet  - Regular diet  - Oral pain medications  - Resume home medications  - Up and out of bed ambulating  - Will discuss discharge today vs tomorrow pending discussion with staff and wound vac approval     Adriana Medeiros DO  10/28/20  6:26 AM  [phone]    I saw and independently examined the patient today. I agree with the history of present illness, past medical/surgical histories, family history, social history, medication list and allergies as listed. The review of systems is as noted above. My physical exam confirms the findings listed above.  Review of labs, pathology reports, radiology reports and medical records confirm the findings noted above. I edited the note where appropriate in italics, strikethrough font, or underline. Doing well overall. Ok to transition to [facility] device (seal check on machine). Plan for DC home today.     Sreedhar Mercado MD  [address] [place] [address] [address] Reconstructive Surgery  [phone]  10/28/20

## 2020-10-29 NOTE — DISCHARGE SUMMARY
Order Name Status Description    10/26/2020 0730 Surgical Pathology In process     10/6/2020 1312 SURGICAL PATHOLOGY In process     10/6/2020 1304 SURGICAL PATHOLOGY In process           Patient Instructions:   Discharge Medication List as of 10/28/2020  3:54 PM      START taking these medications    Details   ondansetron (ZOFRAN-ODT) 4 MG disintegrating tablet Take 1 tablet by mouth every 8 hours as needed for Nausea or Vomiting, Disp-15 tablet,R-0Print      bacitracin-polymyxin b (POLYSPORIN) [id number] UNIT/GM ointment Apply topically 2 times daily. , Disp-1 Tube,R-1, Print         CONTINUE these medications which have NOT CHANGED    Details   CALQUENCE 100 MG CAPS 2 times daily , DAWHistorical Med      acetaminophen (APAP EXTRA STRENGTH) 500 MG tablet Take 1 tablet by mouth every 6 hours as needed for Pain or Fever, Disp-30 tablet, R-0Print      glimepiride (AMARYL) 1 MG tablet Take 1 mg by mouth Daily with supper Historical Med      Insulin Degludec (TRESIBA FLEXTOUCH) 100 UNIT/ML SOPN Inject 22-30 Units into the skin nightly Historical Med      venlafaxine (EFFEXOR) 37.5 MG tablet Take 37.5 mg by mouth daily Historical Med      Liraglutide (VICTOZA) 18 MG/3ML SOPN SC injection Inject 0.6 mg into the skin dailyHistorical Med      lisinopril (PRINIVIL;ZESTRIL) 20 MG tablet Take 20 mg by mouth daily Historical Med      MULTIPLE VITAMIN PO Take  by mouth. Take 1 vitamin a day. Senior Vitamin      desloratadine (CLARINEX) 5 MG tablet Take 5 mg by mouth daily. fenofibrate (TRICOR) 145 MG tablet Take 145 mg by mouth daily. Activity: no lifting, Driving, or Strenuous exercise until seen at your follow up appointment  Diet: regular diet  Wound Care: PSO to bilateral ears daily    Follow-up with Dr. Maryuri [facility] on Monday 11/2/20 at 2:00 pm.    Signed:      D. Venia Denver NP-C  [phone]  Resident Support Staff    10/29/2020  2:07 PM

## 2020-10-30 NOTE — TELEPHONE ENCOUNTER
Called pt to change post op from Wed to Tues per Dr Purnima Faria request; wife states he has an oncology appt on Tues; she states Trini Marquez is very fatigued right now and she requests to keep his post op for Wed if possible. More than 1 appt in a day is too much for him. If ok to keep appt on Wed,no need to call. If need to change to Tues, please let her know.

## 2020-11-02 NOTE — PROGRESS NOTES
[facility]    PROCEDURE: 1) Excision of fungating chest squamous cell carcinoma (20 x 11.5 cm)                                                         2) Application of split thickness skin graft to chest (20 x 11.5 cm)                                                         3) Application of wound vacuum device                                                         4) Excision of right lateral chest lesion (2.5 x 1 cm)                                                         5) Complex closure of right chest (3.6 cm)                                                         6) Left auricular reconstruction with scalp advancement flap (wound: 2 x 5 cm; flap: 3.5 x 5 cm)                                                         7) Application of split thickness skin graft to scalp (2.5 x 1.5 cm)  DATE: 10/26/20    Nicole Gilbert has been recovering well since his procedure. Pain has been well controlled with the prescribed pain management regimen. EXAM    BP (!) 105/58 (Site: Left Upper Arm, Position: Sitting, Cuff Size: Small Adult)   Pulse 113   Temp 98.1 °F (36.7 °C) (Temporal)   SpO2 98%     GEN: NAD   HEENT: Incisions healing well  Grafts with 100% take  CHEST: STSG with near 100% take  EXT: Donor site healing well    PATHOLOGY: SCC    IMP: 76 y. o.male s/p excision of fungating SCC and STSG closure to chest and auricular reconstruction  PLAN: Recovering well overall. Discussed local wound care with provider and wife. Apply Bacitracin to auricular reconstruction and bacitracin/xeroform to the chest wound. Will follow-up in 1 month.      Chelsey Alonzo MD  [address] [place] [address] [address] Reconstructive Surgery  [phone]  11/02/20

## 2020-11-03 NOTE — TELEPHONE ENCOUNTER
Called and spoke with home care nurse and gave her orders for xeroform and polysporin to be changed daily. Her name is Jessica Pineda [phone] with [facility].

## 2020-11-03 NOTE — TELEPHONE ENCOUNTER
He saw my facial plastics colleague on Monday and he was healing well. Since he is feeling fatigued, lets get him in next week for a postop check.   Thank you

## 2020-11-04 NOTE — ED PROVIDER NOTES
[address] [facility]      Pt Name: Nicole Gilbert  MRN: [id number]  [place] 1945  Date of evaluation: 11/4/2020  Provider: Marlen Lowery MD    CHIEF COMPLAINT       Chief Complaint   Patient presents with    Leg Swelling     sent by pcp         HISTORY OF PRESENT ILLNESS   (Location/Symptom, Timing/Onset,Context/Setting, Quality, Duration, Modifying Factors, Severity)  Note limiting factors. Nicole Gilbert is a 76 y.o. male who presents to the emergency department for evaluation of lower leg swelling. Patient was recently admitted to the hospital for removal of a left ear squamous cell carcinoma. Admitted 10/26 through 10/28. He was at a follow-up appointment with his ENT this morning and his wife mentioned that he has had increased leg swelling for the past few days. ENT recommended the patient come to the emergency department for further evaluation. He is not complaining of pain in his legs. His wife reports that he rarely complains when he does have pain however. Patient himself denies any chest pain or shortness of breath in the past few days. His wife has noted that with walking, he sounds more short of breath. She is also heard him coughing, but reports the cough has been nonproductive. They are unsure about hemoptysis as both the patient and his wife are blind. Wife reports a history of peripheral edema, but states has been somewhat worse for the past few days. Their son was visiting recently and felt that the patient's groin may be swollen as well, patient has not noticed this. Wife reports that their primary care provider did prescribe an antibiotic for the cough. NursingNotes were reviewed. REVIEW OF SYSTEMS    (2-9 systems for level 4, 10 or more for level 5)       Constitutional: No fever or chills. Eye: No visual disturbances. No eye pain. Ear/Nose/Mouth/Throat: No nasal congestion.  No sore throat. Respiratory: Dry cough, shortness of breath, No sputum production. Cardiovascular: No chest pain. No palpitations. Gastrointestinal: No abdominal pain. No nausea or vomiting  Genitourinary: No dysuria. No hematuria. Hematology/Lymphatics: No bleeding or bruising tendency. Immunologic: No malaise. No swollen glands. Musculoskeletal: No back pain. No joint pain. Lower extremity swelling. Integumentary: No rash. No abrasions. Neurologic: No headache. No focal numbness or weakness.       PAST MEDICAL HISTORY     Past Medical History:   Diagnosis Date    Anxiety     Blind     Blood disorder     Cancer ([facility]     chronic lymphoid leukemia    Diabetes mellitus ([facility]     type 2    Encounter for long-term (current) use of high-risk medication 2/8/2016    Glaucoma     [place] (hard of hearing)     wears hearing aid    Hyperlipidemia     Hypertension     Sleep apnea     CPAP    Wound, open     open draining chest wound w/ dsd         SURGICALHISTORY       Past Surgical History:   Procedure Laterality Date    APPENDECTOMY      COLONOSCOPY      EXTERNAL EAR SURGERY N/A 10/26/2020    TOTAL LEFT AURICULECTOMY, RIGHT AURICLE BIOPSY, RIGHT PARTIAL AURICULECTOMY,  RESECTION OF ANTERIOR CHEST WALL LESION, RECONSTRUCTION WITH SKIN GRAFT, LOCAL TISSUE FLAPS performed by Helen Vallejo MD at [place] [address]  2006    HAND SURGERY Left 10/16/14    excision left thumb lesion with skin graft/trigger finger release left thumb and 5th finger    LYMPH NODE DISSECTION  2008    PRE-MALIGNANT / BENIGN SKIN LESION EXCISION      : excision 3.4 cm right shoulder lesion and 1.7 cm incisional biopsy left thumb    SKIN BIOPSY N/A 10/26/2020    EXCISION OF FUNGATING CHEST SQUAMOUS CELL CARCINOMA, SPLIT THICKNESS SKIN GRAFT FOR RECONSTRUCTION OF CHEST, POSSIBLE TEMPOPARIETAL MUSCLE FLAP FOR AURICULAR RECONSTRUCTION, SPLIT THICKNESS GRAFT performed by Yulisa Peters MD at [address] [place] [address] N/A 10/26/2020 Socioeconomic History    Marital status:      Spouse name: None    Number of children: None    Years of education: None    Highest education level: None   Occupational History    None   Social Needs    Financial resource strain: None    Food insecurity     Worry: None     Inability: None    Transportation needs     Medical: None     Non-medical: None   Tobacco Use    Smoking status: Never Smoker    Smokeless tobacco: Never Used   Substance and Sexual Activity    Alcohol use: Yes     Comment: rare use    Drug use: No    Sexual activity: None   Lifestyle    Physical activity     Days per week: None     Minutes per session: None    Stress: None   Relationships    Social connections     Talks on phone: None     Gets together: None     Attends Muslim service: None     Active member of club or organization: None     Attends meetings of clubs or organizations: None     Relationship status: None    Intimate partner violence     Fear of current or ex partner: None     Emotionally abused: None     Physically abused: None     Forced sexual activity: None   Other Topics Concern    None   Social History Narrative    None       SCREENINGS             PHYSICAL EXAM    (up to 7 for level 4, 8 or more for level 5)     ED Triage Vitals [11/04/20 1209]   BP Temp Temp src Pulse Resp SpO2 Height Weight   134/62 -- -- 90 16 99 % -- --       General: Alert and oriented, No acute distress. Eye: Normal conjunctiva. Pupils equal and reactive. HENT: Oral mucosa is moist.  Respiratory: Lungs are clear to auscultation, Respirations are non-labored. Cardiovascular: Normal rate, Regular rhythm. Gastrointestinal: Soft, Non-tender, Non-distended. No genital edema. Musculoskeletal: Impression stockings to bilateral lower extremities, no significant edema noted beneath the stockings. No calf tenderness to palpation. No overlying tenderness, warmth, or erythema.  Normal neurovascular exam bilaterally. Integumentary: Warm, Dry. Neurologic: Alert, Oriented, No focal defects. Psychiatric: Cooperative. DIAGNOSTIC RESULTS     EKG: All EKG's are interpreted by the Emergency Department Physician who either signs or Co-signsthis chart in the absence of a cardiologist.    Per my interpretation:    Electrocardiogram (ECG) 11/4/2020 1344  RATE: normal  RHYTHM: normal sinus  AXIS: normal  INTERVALS: normal  ST-T WAVE CHANGES: No evidence of ST segment elevation or T-wave inversion, inferior Q waves not changed from prior  Prior for comparison 3/3/2020    RADIOLOGY:   Non-plain filmimages such as CT, Ultrasound and MRI are read by the radiologist. Plain radiographic images are visualized and preliminarily interpreted by the emergency physician with the below findings:      Interpretation per the Radiologist below, if available at the time ofthis note:    CT CHEST PULMONARY EMBOLISM W CONTRAST   Final Result   No pulmonary embolus is identified. Marked progression of multi compartmental adenopathy, since 03/23/2020. This   includes the adenopathy in the following regions: supraclavicular, hilar,   right infrahilar, bilateral axillary and upper abdominal.  Splenomegaly has   progressed as well. Small bilateral pleural effusions with overlying atelectasis. Pneumonia   considered less likely. XR CHEST (2 VW)   Final Result   Interval removal of the left-sided chemo port and placement of a right-sided   chemo port. The catheter for the left-sided chemo port remains in place and   appears broken at the proximal end. Recommend comparison with clinical exam   and follow-up films. No pneumothorax. Prominence of the right pulmonary   krunal noted. Recommend comparison with chest CT. Findings communicated to Dr. Tatiana Chen at the time of dictation.          VL Extremity Venous Bilateral               ED BEDSIDE ULTRASOUND:   Performed by ED Physician - none    LABS:  Labs Reviewed   CBC WITH AUTO DIFFERENTIAL - Abnormal; Notable for the following components:       Result Value    WBC 16.6 (*)     RBC 3.05 (*)     Hemoglobin 9.5 (*)     Hematocrit 28.5 (*)     RDW 16.5 (*)     Platelets 42 (*)     Lymphocytes Absolute 12.5 (*)     Smudge Cells Present (*)     Anisocytosis 1+ (*)     Polychromasia Occasional (*)     Acanthocytes Occasional (*)     Ovalocytes Occasional (*)     All other components within normal limits    Narrative:     Performed at:  [address] [facility] [address]   Phone [phone]   BASIC METABOLIC PANEL W/ REFLEX TO MG FOR LOW K - Abnormal; Notable for the following components:    Sodium 133 (*)     Glucose 58 (*)     All other components within normal limits    Narrative:     Performed at:  [address] [facility] [address]   Phone [phone]   D-DIMER, QUANTITATIVE - Abnormal; Notable for the following components:    D-Dimer, Quant 549 (*)     All other components within normal limits    Narrative:     Performed at:  [address] [facility] [address]   Phone [phone]   BRAIN NATRIURETIC PEPTIDE    Narrative:     Performed at:  [address] [facility] [address]   Phone [phone]       All other labs were within normal range or not returned as of this dictation. EMERGENCY DEPARTMENT COURSE and DIFFERENTIAL DIAGNOSIS/MDM:   Vitals:    Vitals:    11/04/20 1515 11/04/20 1530 11/04/20 1545 11/04/20 1600   BP: (!) 131/58 135/71 (!) 140/59 135/64   Pulse: 97 95 93 95   Resp: 17 18 17 16   Temp:    98.4 °F (36.9 °C)   TempSrc:    Oral   SpO2: 92% 93% 93% 93%         Medical decision making:     This is a 41-year-old male with history of CLL, recent surgery for left ear squamous cell carcinoma who presents for evaluation after referral by ENT for leg swelling. Patient's wife does report that he has had some dyspnea with exertion as well. Patient denies chest pain or shortness of breath at the time of evaluation here. He is noted to have mild pitting edema to the bilateral lower extremities no tenderness or erythema. Otherwise, patient is well-appearing, afebrile, with normal vital signs. Considered DVT, PE, pneumonia, heart failure. CBC shows a WBC of 16, which actually improved from patient's baseline. Platelets are chronically low, 42 today. Hemoglobin is also at baseline at 9.5. BMP is unremarkable. BNP normal at 348. D-dimer slightly elevated at 549, therefore we did obtain Doppler ultrasounds of the bilateral lower extremities as well as CTA of the chest.  Doppler ultrasound shows no evidence of DVT, there is large lymph nodes on the left. It is possible that poor lymphatic drainage is contributing to swelling. On chest x-ray, radiology called to inform me that there appears to be retained tubing of a left chemotherapy port. Medical record was reviewed and when the port was removed in May of this year, interventional radiology was unable to remove the tubing, so it was sutured in place to the skin. Family is already aware of this finding. CT of the chest shows no evidence of PE or other acute pulmonary pathology. There is progression of multicompartmental adenopathy. Patient follows with Dr. Jennifer Hartman of oncology, and recommended to call for a close follow-up appointment within the next few days. He will return with new or worsening symptoms. Patient and his wife are agreeable plan of care and he is discharged in stable condition. CRITICAL CARE TIME   Total Critical Care time was 0 minutes, excluding separately reportable procedures. There was a high probability of clinically significant/life threatening deterioration in the patient's condition which required my urgent intervention.       CONSULTS:  None    PROCEDURES:  Unless otherwise noted below, none         FINAL IMPRESSION      1.  Leg edema          DISPOSITION/PLAN   DISPOSITION Decision To Discharge 11/04/2020 03:42:12 PM      PATIENT REFERRED TO:  Alwin Councilman, MD  [place] [id number]  [phone]    Schedule an appointment as soon as possible for a visit in 3 days      Jaime Johnson MD  [address] [facility] [address] [facility] [employer] [address] [facility]  [phone]    Schedule an appointment as soon as possible for a visit in 2 days        DISCHARGE MEDICATIONS:  Discharge Medication List as of 11/4/2020  3:47 PM             (Please note that portions of this note were completed with a voice recognition program.Efforts were made to edit the dictations but occasionally words are mis-transcribed.)    Chelsie Villeda MD (electronically signed)  Attending Emergency Physician        Chelsie Villeda MD  11/04/20 6559

## 2020-11-04 NOTE — ED NOTES
Pt has not returned to department from vascular test at this time.      Windy Marlow, RN  11/04/20 5348

## 2020-11-04 NOTE — PROGRESS NOTES
Evangelista      Patient Name: Terrance Lemus Record Number:  <[id number]>  Primary Care Physician:  Shona Adams MD  Date of Consultation: 11/4/2020     Chief Complaint:   Chief Complaint   Patient presents with   M[address] 39 is a(n) 76 y.o. male who presents today for evaluation of multiple lesions of the head and neck. The patient is accompanied by his wife who provides part of the history. Patient first notes that he is had a left ear lesion which is been present for at least a year. It is slowly been growing in size. It sounds like it recently was determined to start undergoing rapid enlargement. Additionally he has multiple other lesions of the scalp which are being followed by dermatologist and which have been frozen and treated in the past.  He also states that he underwent a Mohs procedure over the summer for a large anterior chest wall mass. This was reportedly a mixture of basal and squamous cell. It was reportedly completely excised. It is now returned. He returned to his Mohs surgeon several months ago for procedure to be performed on his left ear when there was concern that it enlarged and the determination was made that a biopsy need to be performed. Per the patient although I do not have these records available to me there is a diagnosis of squamous cell cancer. The patient has a very complicated history given that he has had CLL for many years. He has received chemotherapy as well as targeted radiation to the neck for lymphadenopathy in the past.    I independently reviewed his CT chest from 3/20/2020. It shows evidence of stable findings of bulky mediastinal left axillary and upper abdominal lymphadenopathy. I do not appreciate the chest lesion. Update 10/14/2020:    Patient presents today for follow-up.   I independently reviewed his CT temporal bone as well as a CT chest and neck with contrast.  It shows evidence of a large left auricular lesion without discrete invasion of the temporal bone. CT chest there is a large central mass as noted in exam this appears to come close to but not invade the sternum. There is bulky adenopathy bilaterally in the neck, but per review it appears that this is decreased and he does have a history of adenopathy of the neck related to his CLL. I reviewed the results with the patient and formed him that both biopsy sites were consistent with squamous cell carcinoma. The patient continues to have significant tenderness associated with both of his wounds. His chest wound is draining significantly and his wife finds it difficult to perform dressing changes. Update 11/4/2020:    Patient presents today for follow-up. He has some issues related to new onset swelling and pain of his left lower extremity. He also has significant swelling of his penis. His pathology results are listed below. A. Right auricular (ear) lesion:      - Basal cell carcinoma; see Comment.        B. Chest wall tumor (squamous cell carcinoma):      - Moderately to poorly differentiated squamous cell carcinoma focally        a fraction of a mm from the black-inked deep margin (the tumor        capsule abuts the deep margin).        C. Left post-auricular sulcus inferior:      - Negative for carcinoma, keratin AE1+3/Cam 5.2 immunostain on block.        D. Left post-auricular sulcus superior:      - Negative for carcinoma, keratin AE1+3/Cam 5.2 immunostain on block.        E. Left helix:      - Squamous carcinoma present, the inked margin negative for tumor.        F. Left conchal bowl:      - Negative for carcinoma.        G. Left inferior conchal bowl:      - Negative for carcinoma, keratin AE1+3/Cam 5.2 immunostain on block.        H. Left lobule:      - Negative for carcinoma, keratin AE1+3/Cam 5.2 immunostain on block.        I.  Left mastoid:      - Negative for carcinoma, keratin AE1+3/Cam 5.2 immunostain on block.        J. Right partial auriculectomy (ear):      - Squamous cell carcinoma in situ focally to the blue-inked superior        margin.      - Invasive basal cell carcinoma, excised.      - See Comment.        K. Right auriculectomy margin:      - Squamous cell carcinoma, cannot exclude focal invasion, margins        negative.        L. Right lateral chest lesion:      - Moderately differentiated invasive squamous cell carcinoma, focally        to the black-inked deep margin and blue inked lateral tip, and less        than 1 mm from the green-inked inferior margin.        M. Left subtotal auriculectomy (ear):      - Moderately differentiated invasive squamous cell carcinoma to the        black-inked peripheral margin (extending from the obvious surface        lesion).      Patient Active Problem List   Diagnosis    Chronic lymphocytic leukemia ([facility]    Hypogammaglobulinemia (HCC)    CLL (chronic lymphoid leukemia) in relapse ([facility] [facility]    Congenital blindness    Encounter for long-term (current) use of high-risk medication    Chronic lymphocytic leukemia of B-cell type in relapse ([facility] [address] [facility]    High risk medication use    Fever of unknown origin    DMII (diabetes mellitus, type 2) ([facility] [address] [facility]    Hypertension    Hyperlipidemia    Pneumonia due to organism    CAP (community acquired pneumonia)    Status post chemotherapy    CLL (chronic lymphocytic leukemia) (HCC)    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    SCC (squamous cell carcinoma), ear, left     Past Surgical History:   Procedure Laterality Date    APPENDECTOMY      COLONOSCOPY      EXTERNAL EAR SURGERY N/A 10/26/2020    TOTAL LEFT AURICULECTOMY, RIGHT AURICLE BIOPSY, RIGHT PARTIAL AURICULECTOMY,  RESECTION OF ANTERIOR CHEST WALL LESION, RECONSTRUCTION WITH SKIN GRAFT, LOCAL TISSUE FLAPS performed by Jake Stoddard MD at [place] [address]  2006    HAND SURGERY Left 10/16/14 excision left thumb lesion with skin graft/trigger finger release left thumb and 5th finger    LYMPH NODE DISSECTION  2008    PRE-MALIGNANT / BENIGN SKIN LESION EXCISION      : excision 3.4 cm right shoulder lesion and 1.7 cm incisional biopsy left thumb    SKIN BIOPSY N/A 10/26/2020    EXCISION OF FUNGATING CHEST SQUAMOUS CELL CARCINOMA, SPLIT THICKNESS SKIN GRAFT FOR RECONSTRUCTION OF CHEST, POSSIBLE TEMPOPARIETAL MUSCLE FLAP FOR AURICULAR RECONSTRUCTION, SPLIT THICKNESS GRAFT performed by Dona Light MD at [address] 10/26/2020    .  performed by Dona Light MD at [address] TUNNELED VENOUS PORT PLACEMENT Left 2006    Removed on 5/5/2020 by Dr. Mayra Barbosa TUNNELED VENOUS PORT PLACEMENT  2006    TUNNELED VENOUS PORT PLACEMENT Right 05/05/2020    SmartPort; RIJ access; cut at 25 cm; Dr. Bonner Lipoma     Family History   Problem Relation Age of Onset    Breast Cancer Sister     Coronary Art Dis Mother     Diabetes Mother     Elevated Lipids Mother     Hypertension Mother    Yadira Solum Obesity Mother     Obesity Brother      Social History     Tobacco Use    Smoking status: Never Smoker    Smokeless tobacco: Never Used   Substance Use Topics    Alcohol use: Yes     Comment: rare use    Drug use: No        Admission on 10/26/2020, Discharged on 10/28/2020   Component Date Value Ref Range Status    WBC 10/26/2020 28.2* 4.0 - 11.0 K/uL Final    RBC 10/26/2020 3.93* 4.20 - 5.90 M/uL Final    Hemoglobin 10/26/2020 12.1* 13.5 - 17.5 g/dL Final    Hematocrit 10/26/2020 35.5* 40.5 - 52.5 % Final    MCV 10/26/2020 90.2  80.0 - 100.0 fL Final    MCH 10/26/2020 30.7  26.0 - 34.0 pg Final    MCHC 10/26/2020 34.0  31.0 - 36.0 g/dL Final    RDW 10/26/2020 15.2  12.4 - 15.4 % Final    Platelets 87/07/4202 109* 135 - 450 K/uL Final    MPV 10/26/2020 8.0  5.0 - 10.5 fL Final    ABO/Rh 10/26/2020 O NEG   Final    Antibody Screen 10/26/2020 NEG   Final    Sodium 10/26/2020 132* 136 - 145 mmol/L Final    Potassium 10/26/2020 4.5  3.5 - 5.1 mmol/L Final    Chloride 10/26/2020 99  99 - 110 mmol/L Final    CO2 10/26/2020 20* 21 - 32 mmol/L Final    Anion Gap 10/26/2020 13  3 - 16 Final    Glucose 10/26/2020 178* 70 - 99 mg/dL Final    BUN 10/26/2020 13  7 - 20 mg/dL Final    CREATININE 10/26/2020 1.3  0.8 - 1.3 mg/dL Final    GFR Non- 10/26/2020 54* >60 Final    Comment: >60 mL/min/1.73m2 EGFR, calc. for ages 25 and older using the  MDRD formula (not corrected for weight), is valid for stable  renal function.  GFR  10/26/2020 >60  >60 Final    Comment: Chronic Kidney Disease: less than 60 ml/min/1.73 sq.m. Kidney Failure: less than 15 ml/min/1.73 sq.m. Results valid for patients 18 years and older.       Calcium 10/26/2020 10.1  8.3 - 10.6 mg/dL Final    Total Protein 10/26/2020 6.4  6.4 - 8.2 g/dL Final    Alb 10/26/2020 4.1  3.4 - 5.0 g/dL Final    Albumin/Globulin Ratio 10/26/2020 1.8  1.1 - 2.2 Final    Total Bilirubin 10/26/2020 0.4  0.0 - 1.0 mg/dL Final    Alkaline Phosphatase 10/26/2020 79  40 - 129 U/L Final    ALT 10/26/2020 12  10 - 40 U/L Final    AST 10/26/2020 34  15 - 37 U/L Final    Globulin 10/26/2020 2.3  g/dL Final    POC Glucose 10/26/2020 177* 70 - 99 mg/dl Final    Performed on 10/26/2020 ACCU-CHEK   Final    POC Glucose 10/26/2020 176* 70 - 99 mg/dl Final    Performed on 10/26/2020 ACCU-CHEK   Final    WBC 10/27/2020 27.5* 4.0 - 11.0 K/uL Final    RBC 10/27/2020 3.18* 4.20 - 5.90 M/uL Final    Hemoglobin 10/27/2020 9.8* 13.5 - 17.5 g/dL Final    Hematocrit 10/27/2020 29.0* 40.5 - 52.5 % Final    MCV 10/27/2020 91.3  80.0 - 100.0 fL Final    MCH 10/27/2020 30.9  26.0 - 34.0 pg Final    MCHC 10/27/2020 33.8  31.0 - 36.0 g/dL Final    RDW 10/27/2020 15.1  12.4 - 15.4 % Final    Platelets 86/85/5372 92* 135 - 450 K/uL Final    MPV 10/27/2020 7.5  5.0 - 10.5 fL Final    PLATELET SLIDE REVIEW 10/27/2020 Decreased   Final    Path Consult 10/27/2020 Yes   Final    Sent for Pathology Review    Neutrophils % 10/27/2020 52.0  % Final    Lymphocytes % 10/27/2020 33.0  % Final    Monocytes % 10/27/2020 3.0  % Final    Eosinophils % 10/27/2020 0.0  % Final    Basophils % 10/27/2020 0.0  % Final    Neutrophils Absolute 10/27/2020 14.6* 1.7 - 7.7 K/uL Final    Lymphocytes Absolute 10/27/2020 12.1* 1.0 - 5.1 K/uL Final    Monocytes Absolute 10/27/2020 0.8  0.0 - 1.3 K/uL Final    Eosinophils Absolute 10/27/2020 0.0  0.0 - 0.6 K/uL Final    Basophils Absolute 10/27/2020 0.0  0.0 - 0.2 K/uL Final    Atypical Lymphocytes Relative 10/27/2020 11* 0 - 6 % Final    Myelocyte Percent 10/27/2020 1* % Final    Smudge Cells 10/27/2020 Present*  Final    Sodium 10/27/2020 131* 136 - 145 mmol/L Final    Potassium 10/27/2020 4.0  3.5 - 5.1 mmol/L Final    Chloride 10/27/2020 102  99 - 110 mmol/L Final    CO2 10/27/2020 20* 21 - 32 mmol/L Final    Anion Gap 10/27/2020 9  3 - 16 Final    Glucose 10/27/2020 96  70 - 99 mg/dL Final    BUN 10/27/2020 16  7 - 20 mg/dL Final    CREATININE 10/27/2020 1.4* 0.8 - 1.3 mg/dL Final    GFR Non- 10/27/2020 49* >60 Final    Comment: >60 mL/min/1.73m2 EGFR, calc. for ages 25 and older using the  MDRD formula (not corrected for weight), is valid for stable  renal function.  GFR  10/27/2020 60* >60 Final    Comment: Chronic Kidney Disease: less than 60 ml/min/1.73 sq.m. Kidney Failure: less than 15 ml/min/1.73 sq.m. Results valid for patients 18 years and older.       Calcium 10/27/2020 9.2  8.3 - 10.6 mg/dL Final    Phosphorus 10/27/2020 1.7* 2.5 - 4.9 mg/dL Final    Alb 10/27/2020 3.4  3.4 - 5.0 g/dL Final    Magnesium 10/27/2020 1.50* 1.80 - 2.40 mg/dL Final    POC Glucose 10/26/2020 94  70 - 99 mg/dl Final    Performed on 10/26/2020 ACCU-CHEK   Final    POC Glucose 10/26/2020 118* 70 - 99 mg/dl Final    Performed on 10/26/2020 ACCU-CHEK   Final    POC Glucose 10/27/2020 105* 70 - 99 mg/dl Final    Performed on 10/27/2020 ACCU-CHEK   Final    Sodium 10/27/2020 133* 136 - 145 mmol/L Final    Potassium 10/27/2020 4.3  3.5 - 5.1 mmol/L Final    Chloride 10/27/2020 99  99 - 110 mmol/L Final    CO2 10/27/2020 20* 21 - 32 mmol/L Final    Anion Gap 10/27/2020 14  3 - 16 Final    Glucose 10/27/2020 169* 70 - 99 mg/dL Final    BUN 10/27/2020 20  7 - 20 mg/dL Final    CREATININE 10/27/2020 1.2  0.8 - 1.3 mg/dL Final    GFR Non- 10/27/2020 59* >60 Final    Comment: >60 mL/min/1.73m2 EGFR, calc. for ages 25 and older using the  MDRD formula (not corrected for weight), is valid for stable  renal function.  GFR  10/27/2020 >60  >60 Final    Comment: Chronic Kidney Disease: less than 60 ml/min/1.73 sq.m. Kidney Failure: less than 15 ml/min/1.73 sq.m. Results valid for patients 18 years and older.  Calcium 10/27/2020 9.2  8.3 - 10.6 mg/dL Final    Phosphorus 10/27/2020 2.5  2.5 - 4.9 mg/dL Final    Alb 10/27/2020 3.3* 3.4 - 5.0 g/dL Final    POC Glucose 10/27/2020 129* 70 - 99 mg/dl Final    Performed on 10/27/2020 ACCU-CHEK   Final    POC Glucose 10/27/2020 218* 70 - 99 mg/dl Final    Performed on 10/27/2020 ACCU-CHEK   Final    Magnesium 10/28/2020 2.30  1.80 - 2.40 mg/dL Final    Sodium 10/28/2020 134* 136 - 145 mmol/L Final    Potassium 10/28/2020 3.6  3.5 - 5.1 mmol/L Final    Chloride 10/28/2020 103  99 - 110 mmol/L Final    CO2 10/28/2020 19* 21 - 32 mmol/L Final    Anion Gap 10/28/2020 12  3 - 16 Final    Glucose 10/28/2020 78  70 - 99 mg/dL Final    BUN 10/28/2020 22* 7 - 20 mg/dL Final    CREATININE 10/28/2020 1.2  0.8 - 1.3 mg/dL Final    GFR Non- 10/28/2020 59* >60 Final    Comment: >60 mL/min/1.73m2 EGFR, calc. for ages 25 and older using the  MDRD formula (not corrected for weight), is valid for stable  renal function.       GFR  changes, no blurry vision  EARS: no changes in hearing, no otalgia  NOSE: no epistaxis, no rhinorrhea  RESPIRATORY: no  Difficulty breathing, no shortness of breath  CV: no chest pain, no Peripheral vascular disease  HEME: No coagulation disorder, no Bleeding disorder  NEURO: no TIA or stroke-like symptoms  SKIN: No new rashes in the head and neck, no recent skin cancers  MOUTH: No new ulcers, no recent teeth infections  GASTROINTESTINAL: No diarrhea, stomach pain  PSYCH: No anxiety, no depression      PHYSICAL EXAM  Temp 97.8 °F (36.6 °C) (Temporal)     GENERAL: No Acute Distress, Alert and Oriented, no Hoarseness, strong voice  EYES: EOMI, Anti-icteric  HENT:   Head: Normocephalic and atraumatic. Face:  Symmetric, facial nerve intact, no sinus tenderness  Right Ear: Normal external ear, normal external auditory canal, intact tympanic membrane with normal mobility and aerated middle ear  Left Ear: There is a large 7 cm exophytic lesion of the left auricle extending posteriorly to the sulcus and anteriorly to the conchal bowl. There is no obvious erosion of the external auditory canal.  Mouth/Oral Cavity:  normal lips, Uvula is midline, no mucosal lesions, no trismus, fair dentition, normal salivary quality/flow  Oropharynx/Larynx:  normal oropharynx, normal tonsils; normal larynx/nasopharynx on mirror exam  Nose:Normal external nasal appearance. Anterior rhinoscopy shows a line septum. Normal turbinates. Normal mucosa   NECK: Normal range of motion, no thyromegaly, trachea is midline, no lymphadenopathy, no neck masses, no crepitus  CHEST: Normal respiratory effort, no retractions, breathing comfortably  SKIN: No rashes, normal appearing skin, no evidence of skin lesions/tumors  Neuro:  cranial nerve II-XII intact; normal gait  Cardio:  no edema    There is a large 7 cm mass located about the midline of the superior aspect of the sternum. This is ulcerated and tender to palpation.         Bladimir Mendes initial consult    PROCEDURE  Incisional biopsy of left ear lesion and anterior chest wall lesion - CPT 19498 and 01116    Pre op: Nasal tip lesion  Post op: Same  Procedure :   Surgeon: Arnoldo Goodwin MD  Anesthesia: 2% Xylocaine with [id number] epi     Description: After written consent was obtained the ear lesion and chest lesion were visualized and the surrounding area was infiltrated with 2% Xylocaine with 1 100,000 epinephrine. After allowing adequate anesthesia to take place, a scalpel biopsy was used for each site. A pair of iris scissors were utilized to make sharp cuts at the base of the lesion and a portion of the lesion was removed from both sites. Hemostasis was obtained and the would was closed if appropriate. Pertinent findings: Full biopsy of both sites. The patient tolerated the procedure well and there were no complications. 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 Ear mass, left    3. Anterior chest wall squamous cell carcinoma    4. CLL (chronic lymphocytic leukemia) ([facility] [address]    5. Neck mass    This a follow-up status post surgery for removal of a right sided squamous cell carcinoma and basal cell carcinoma as well as a left-sided subtotal auriculectomy for removal of a large invasive squamous cell carcinoma with reconstructions of both. Additionally he had a removal of a very large anterior chest wall squamous cell carcinoma. The margins were positive in the deep edge. This was reconstructed with a skin graft. Today at our follow-up visit I removed his permanent sutures on his left ear reconstruction. He is healing well from the reconstruction efforts on both the right and left ears. I have asked him to continue his routine with his wound care including washing the wash area with warm water and applying Vaseline 3 times daily. Depending on how he looks we can consider him trialing his hearing aid in his left ear next week.     I am also concerned as he

## 2020-11-04 NOTE — ED NOTES
Pt returned from vascular. Ekg obtained. Pt placed on monitor. IV established and blood work drawn and sent to lab.      Phong Etienne RN  11/04/20 [id number]

## 2020-11-05 NOTE — CARE COORDINATION
ED Visit: Leg Swelling  HX: CLL and on Chemo. Wife stated the swelling has increased. The pt's wife stated the pt's weight has increased by 8 lbs. Denies chest pain or SOB. The wife stated the pt has not been eating as much as normal so she believe it is fluid retention. The wife stated she plans to call the pt's Oncologist, Dr. Elbert Montaño today. No new medications. Patient contacted regarding recent discharge and COVID-19 risk. Discussed COVID-19 related testing which was available at this time. Test results were negative. Patient had a COVID -19 test prior to a surgical procedure. Care Transition Nurse/ Ambulatory Care Manager contacted the patient by telephone to perform post discharge assessment. Verified name and  with patient's wife as identifiers. Patient has following risk factors of: immunocompromised, diabetes and HTN, CLL, PNA. CTN/ACM reviewed discharge instructions, medical action plan and red flags related to discharge diagnosis. Reviewed and educated them on any new and changed medications related to discharge diagnosis. Advised obtaining a 90-day supply of all daily and as-needed medications. Education provided regarding infection prevention, and signs and symptoms of COVID-19 and when to seek medical attention with family who verbalized understanding. Discussed exposure protocols and quarantine from [address] you at higher risk for severe illness  and given an opportunity for questions and concerns. The family agrees to contact the COVID-19 hotline [phone] or PCP office for questions related to their healthcare. CTN/ACM provided contact information for future reference. From CDC: Are you at higher risk for severe illness?  Wash your hands often.  Avoid close contact (6 feet, which is about two arm lengths) with people who are sick.  Put distance between yourself and other people if COVID-19 is spreading in your community.    Clean and disinfect frequently touched surfaces.  Avoid all cruise travel and non-essential air travel.  Call your healthcare professional if you have concerns about COVID-19 and your underlying condition or if you are sick. For more information on steps you can take to protect yourself, see CDC's How to Protect Yourself    Wife declined further calls. Pt has Home Health.

## 2020-11-13 NOTE — TELEPHONE ENCOUNTER
[employer]    For the chest, there are no specific recommendations / wound care instructions required. He can simply apply moisturizer to the chest as he would to the rest of his body (Aquafor ideal). For the ear, he does not need anything special.  No specialty dressings at this point. Just daily moisturizing. Will see him in the office as scheduled. Thanks!   NK

## 2020-11-13 NOTE — TELEPHONE ENCOUNTER
Returned patient's wife call. Patient's wife states that [facility] is unable to come on weekends and had no further advice. Asked if they had any additional friends or family who could help, patients wife states that they will try. Called [facility] and spoke with Lina Au (Clincal Manager) . [facility] states that they follow medicare guidelines and that they will  Not preform daily wound care or reimburse for it and they were already not being reimbursed for care Mon- Friday. They are asking if wound care could be preformed Mon, Wed and Fridays and if so, would the wound care orders change? Routing to MD to review for updated wound care orders.

## 2020-11-13 NOTE — TELEPHONE ENCOUNTER
Pt has [facility] coming every day during the week for wound care  They have been having friends come by on Sat and Sun to do the wound care  The friends have been tested for Covid and are quarantining for 2 weeks     Please call Ines Elias, pts wife, on HIPAA, to help her figure out who can come for wound care on the weekends-she said she is blind and cannot do it.     [facility] ordered supplies for pt a week ago and they have not been delivered yet-[facility] will follow up on where the order is    Pt is out [employer]

## 2020-11-13 NOTE — TELEPHONE ENCOUNTER
Called patient's wife to discuss update. Called [facility] to update them as well. LM for Arina Patton (Clinical Manager) with details.

## 2020-11-18 NOTE — PROGRESS NOTES
Evangelista      Patient Name: Terrance Lemus Record Number:  <[id number]>  Primary Care Physician:  Raquel Chaudhary MD  Date of Consultation: 11/18/2020     Chief Complaint:   Chief Complaint   Patient presents with    Post-Op Check     nodule on the chest area     Other     wants to see if the left hearing aid will work         [address] [place] [address] Elle is a(n) 76 y.o. male who presents today for evaluation of multiple lesions of the head and neck. The patient is accompanied by his wife who provides part of the history. Patient first notes that he is had a left ear lesion which is been present for at least a year. It is slowly been growing in size. It sounds like it recently was determined to start undergoing rapid enlargement. Additionally he has multiple other lesions of the scalp which are being followed by dermatologist and which have been frozen and treated in the past.  He also states that he underwent a Mohs procedure over the summer for a large anterior chest wall mass. This was reportedly a mixture of basal and squamous cell. It was reportedly completely excised. It is now returned. He returned to his Mohs surgeon several months ago for procedure to be performed on his left ear when there was concern that it enlarged and the determination was made that a biopsy need to be performed. Per the patient although I do not have these records available to me there is a diagnosis of squamous cell cancer. The patient has a very complicated history given that he has had CLL for many years. He has received chemotherapy as well as targeted radiation to the neck for lymphadenopathy in the past.    I independently reviewed his CT chest from 3/20/2020. It shows evidence of stable findings of bulky mediastinal left axillary and upper abdominal lymphadenopathy. I do not appreciate the chest lesion.     Update 10/14/2020:    Patient carcinoma, keratin AE1+3/Cam 5.2 immunostain on block.        I. Left mastoid:      - Negative for carcinoma, keratin AE1+3/Cam 5.2 immunostain on block.        J. Right partial auriculectomy (ear):      - Squamous cell carcinoma in situ focally to the blue-inked superior        margin.      - Invasive basal cell carcinoma, excised.      - See Comment.        K. Right auriculectomy margin:      - Squamous cell carcinoma, cannot exclude focal invasion, margins        negative.        L. Right lateral chest lesion:      - Moderately differentiated invasive squamous cell carcinoma, focally        to the black-inked deep margin and blue inked lateral tip, and less        than 1 mm from the green-inked inferior margin.        M. Left subtotal auriculectomy (ear):      - Moderately differentiated invasive squamous cell carcinoma to the        black-inked peripheral margin (extending from the obvious surface        lesion).      Patient Active Problem List   Diagnosis    Chronic lymphocytic leukemia ([facility]    Hypogammaglobulinemia (HCC)    CLL (chronic lymphoid leukemia) in relapse ([facility]    Congenital blindness    Encounter for long-term (current) use of high-risk medication    Chronic lymphocytic leukemia of B-cell type in relapse ([address]    High risk medication use    Fever of unknown origin    DMII (diabetes mellitus, type 2) ([address]    Hypertension    Hyperlipidemia    Pneumonia due to organism    CAP (community acquired pneumonia)    Status post chemotherapy    CLL (chronic lymphocytic leukemia) (HCC)    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    SCC (squamous cell carcinoma), ear, left     Past Surgical History:   Procedure Laterality Date    APPENDECTOMY      COLONOSCOPY      EXTERNAL EAR SURGERY N/A 10/26/2020    TOTAL LEFT AURICULECTOMY, RIGHT AURICLE BIOPSY, RIGHT PARTIAL AURICULECTOMY,  RESECTION OF ANTERIOR CHEST WALL LESION, RECONSTRUCTION WITH SKIN GRAFT, LOCAL TISSUE FLAPS performed by Jeff Scott MD at [facility]  2006    HAND SURGERY Left 10/16/14    excision left thumb lesion with skin graft/trigger finger release left thumb and 5th finger    LYMPH NODE DISSECTION  2008    PRE-MALIGNANT / BENIGN SKIN LESION EXCISION      : excision 3.4 cm right shoulder lesion and 1.7 cm incisional biopsy left thumb    SKIN BIOPSY N/A 10/26/2020    EXCISION OF FUNGATING CHEST SQUAMOUS CELL CARCINOMA, SPLIT THICKNESS SKIN GRAFT FOR RECONSTRUCTION OF CHEST, POSSIBLE TEMPOPARIETAL MUSCLE FLAP FOR AURICULAR RECONSTRUCTION, SPLIT THICKNESS GRAFT performed by Kemi Tabares MD at [address] 10/26/2020    .  performed by Kemi Tabares MD at [address] TUNNELED VENOUS PORT PLACEMENT Left 2006    Removed on 5/5/2020 by Dr. Libia Roche TUNNELED VENOUS PORT PLACEMENT  2006    TUNNELED VENOUS PORT PLACEMENT Right 05/05/2020    SmartPort; RIJ access; cut at 25 cm; Dr. Karen Britton     Family History   Problem Relation Age of Onset    Breast Cancer Sister     Coronary Art Dis Mother     Diabetes Mother     Elevated Lipids Mother     Hypertension Mother    [employer] Obesity Mother     Obesity Brother      Social History     Tobacco Use    Smoking status: Never Smoker    Smokeless tobacco: Never Used   Substance Use Topics    Alcohol use: Yes     Comment: rare use    Drug use: No        Admission on 11/04/2020, Discharged on 11/04/2020   Component Date Value Ref Range Status    WBC 11/04/2020 16.6* 4.0 - 11.0 K/uL Final    RBC 11/04/2020 3.05* 4.20 - 5.90 M/uL Final    Hemoglobin 11/04/2020 9.5* 13.5 - 17.5 g/dL Final    Hematocrit 11/04/2020 28.5* 40.5 - 52.5 % Final    MCV 11/04/2020 93.5  80.0 - 100.0 fL Final    MCH 11/04/2020 31.0  26.0 - 34.0 pg Final    MCHC 11/04/2020 33.2  31.0 - 36.0 g/dL Final    RDW 11/04/2020 16.5* 12.4 - 15.4 % Final    Platelets 94/20/9501 42* 135 - 450 K/uL Final    Result consistent with patient history    MPV 11/04/2020 8.0 5.0 - 10.5 fL Final    PLATELET SLIDE REVIEW 11/04/2020 Decreased   Final    SLIDE REVIEW 11/04/2020 see below   Final    Slide review agrees with reported results    Path Consult 11/04/2020 Yes   Final    Sent for Pathology Review    Neutrophils % 11/04/2020 21.0  % Final    Lymphocytes % 11/04/2020 75.0  % Final    Monocytes % 11/04/2020 3.0  % Final    Eosinophils % 11/04/2020 1.0  % Final    Basophils % 11/04/2020 0.0  % Final    Neutrophils Absolute 11/04/2020 3.5  1.7 - 7.7 K/uL Final    Lymphocytes Absolute 11/04/2020 12.5* 1.0 - 5.1 K/uL Final    Monocytes Absolute 11/04/2020 0.5  0.0 - 1.3 K/uL Final    Eosinophils Absolute 11/04/2020 0.2  0.0 - 0.6 K/uL Final    Basophils Absolute 11/04/2020 0.0  0.0 - 0.2 K/uL Final    Smudge Cells 11/04/2020 Present*  Final    Anisocytosis 11/04/2020 1+*  Final    Polychromasia 11/04/2020 Occasional*  Final    Acanthocytes 11/04/2020 Occasional*  Final    Ovalocytes 11/04/2020 Occasional*  Final    Ventricular Rate 11/04/2020 90  BPM Final    Atrial Rate 11/04/2020 90  BPM Final    P-R Interval 11/04/2020 174  ms Final    QRS Duration 11/04/2020 80  ms Final    Q-T Interval 11/04/2020 356  ms Final    QTc Calculation (Bazett) 11/04/2020 435  ms Final    P Axis 11/04/2020 42  degrees Final    R Axis 11/04/2020 1  degrees Final    T Axis 11/04/2020 10  degrees Final    Diagnosis 11/04/2020 Sinus rhythm with occasional Premature ventricular complexesLow voltage QRSInferior infarct , age undeterminedConfirmed by WANDA HARRIS, Nick Levy ([id number]) on 11/4/2020 4:07:16 PM   Final    Sodium 11/04/2020 133* 136 - 145 mmol/L Final    Potassium reflex Magnesium 11/04/2020 4.6  3.5 - 5.1 mmol/L Final    Chloride 11/04/2020 101  99 - 110 mmol/L Final    CO2 11/04/2020 24  21 - 32 mmol/L Final    Anion Gap 11/04/2020 8  3 - 16 Final    Glucose 11/04/2020 58* 70 - 99 mg/dL Final    BUN 11/04/2020 12  7 - 20 mg/dL Final    CREATININE 11/04/2020 0.8  0.8 - 1.3 mg/dL Final    GFR Non- 11/04/2020 >60  >60 Final    Comment: >60 mL/min/1.73m2 EGFR, calc. for ages 25 and older using the  MDRD formula (not corrected for weight), is valid for stable  renal function.  GFR  11/04/2020 >60  >60 Final    Comment: Chronic Kidney Disease: less than 60 ml/min/1.73 sq.m. Kidney Failure: less than 15 ml/min/1.73 sq.m. Results valid for patients 18 years and older.  Calcium 11/04/2020 9.0  8.3 - 10.6 mg/dL Final    Pro-BNP 11/04/2020 348  0 - 449 pg/mL Final    Comment: Methodology by NT-proBNP    An age-independent cutoff point of 300 pg/ml has a 98%  negative predictive value excluding acute heart failure. Values exceeding the age-related cutoff values (450 pg/mL if  age<50, 900 if 50-75 and 1800 if >75) has 90% sensitivity and  84% specificity for diagnosing acute HF. In patients with  renal compromise (eGFR<60) values greater than 1200pg/ml have  a diagnostic sensitivity and specificity of 89% and 72% for  acute HF.  D-Dimer, Quant 11/04/2020 549* 0 - 229 ng/mL DDU Final    Comment: HemosIL D-Dimer is an automated latex enhanced immunoassay for  the quantitative determination of D-dimer in human citrated  plasma on IL Coagulation systems for use in conjunction with a  clinical pretest probability(PTP) assessment model to exclude  venous thromboembolism(VTE) in outpatients suspected of deep  venous thrombosis (DVT) and pulmonary embolism(PE). The  reference range for D-Dimer is <230 ng/ml DDU. Results <230 ng/ml  DDU can be used as a negative predictor in patients with low  and moderate probability of DVT/PE. D-Dimer levels are mainly  elevated in cases of DVT/PE. Other conditions may lead to a  high D-Dimer level including old age, pregnancy, peripheral  arteriopathy, DIC, coronary disease, thrombolytic treatment,  cancer, liver disease, infection, inflammation, hematoma, and  rheumatoid arthritis.   Specimen interferences are created by  lipemia, bilirubin, and hemolysis.  Path Consult 11/04/2020 Reviewed   Final    Comment: Peripheral blood smear and histogram are reviewed. Lymphocytosis is present, consisting of predominantly small forms with  condensed chromatin. These findings may represent a reactive process  such  as viral infection (infectious mononucleosis, CMV, hepatitis,  upper respiratory, etc.), drug effect, autoimmune disorder,  post immunization. However, a lymphoproliferative  process/neoplasm is not excluded. Correlation with clinical findings is  recommended. If no etiology is identified and/or the findings  persist, consider hematology consult with flow cytometric analysis. There is also moderate normocytic anemia and moderate to marked active  thrombocytopenia. Clinical correlation is recommended. CPT code: 50838. Electronically signed: Kathie Garcia MD          DRUG/FOOD ALLERGIES: Patient has no known allergies. CURRENT MEDICATIONS  Prior to Admission medications    Medication Sig Start Date End Date Taking?  Authorizing Provider   ondansetron (ZOFRAN-ODT) 4 MG disintegrating tablet Take 1 tablet by mouth every 8 hours as needed for Nausea or Vomiting 10/28/20   ESTHER Quintanilla - CNP   CALQUENCE 100 MG CAPS 2 times daily  9/14/20   Historical Provider, MD   acetaminophen (APAP EXTRA STRENGTH) 500 MG tablet Take 1 tablet by mouth every 6 hours as needed for Pain or Fever 5/27/19   BEREKET Doss   glimepiride (AMARYL) 1 MG tablet Take 1 mg by mouth Daily with supper     Historical Provider, MD   Insulin Degludec (TRESIBA FLEXTOUCH) 100 UNIT/ML SOPN Inject 22-30 Units into the skin nightly     Historical Provider, MD   venlafaxine (EFFEXOR) 37.5 MG tablet Take 37.5 mg by mouth daily     Historical Provider, MD   Liraglutide (VICTOZA) 18 MG/3ML SOPN SC injection Inject 0.6 mg into the skin daily    Historical Provider, MD   lisinopril (PRINIVIL;ZESTRIL) 20 MG tablet Take 20 mg by mouth daily     Historical Provider, MD   MULTIPLE VITAMIN PO Take  by mouth. Take 1 vitamin a day. Senior Vitamin    Historical Provider, MD   desloratadine (CLARINEX) 5 MG tablet Take 5 mg by mouth daily. Historical Provider, MD   fenofibrate (TRICOR) 145 MG tablet Take 145 mg by mouth daily. Historical Provider, MD       REVIEW OF SYSTEMS  The following systems were reviewed and revealed the following in addition to any already discussed in the HPI:    CONSTITUTIONAL: no weight loss, no fever, no night sweats, no chills  EYES: no vision changes, no blurry vision  EARS: no changes in hearing, no otalgia  NOSE: no epistaxis, no rhinorrhea  RESPIRATORY: no  Difficulty breathing, no shortness of breath  CV: no chest pain, no Peripheral vascular disease  HEME: No coagulation disorder, no Bleeding disorder  NEURO: no TIA or stroke-like symptoms  SKIN: No new rashes in the head and neck, no recent skin cancers  MOUTH: No new ulcers, no recent teeth infections  GASTROINTESTINAL: No diarrhea, stomach pain  PSYCH: No anxiety, no depression      PHYSICAL EXAM  /62 (Site: Right Upper Arm, Position: Sitting, Cuff Size: Large Adult)     GENERAL: No Acute Distress, Alert and Oriented, no Hoarseness, strong voice  EYES: EOMI, Anti-icteric  HENT:   Head: Normocephalic and atraumatic. Face:  Symmetric, facial nerve intact, no sinus tenderness  Right Ear: Normal external ear, normal external auditory canal, intact tympanic membrane with normal mobility and aerated middle ear  Left Ear: There is a large 7 cm exophytic lesion of the left auricle extending posteriorly to the sulcus and anteriorly to the conchal bowl.   There is no obvious erosion of the external auditory canal.  Mouth/Oral Cavity:  normal lips, Uvula is midline, no mucosal lesions, no trismus, fair dentition, normal salivary quality/flow  Oropharynx/Larynx:  normal oropharynx, normal tonsils; normal larynx/nasopharynx on mirror exam  Nose:Normal external nasal aid fits, he may begin using on the left. On the right, there is a small area of exposed cartilage where I suspect the strap from his mask has been rubbing. I have asked him to try to adjust the straps that does not rub in this area and see if this will heal on its own. Unfortunately, it appears as if the patient has a recurrence along the inferior aspect of his anterior chest wall resection. There are multiple areas that are concerning for recurrence. The site under his skin graft looks clear. I discussed with the patient that at this point there is nothing further to be done surgically. I discussed with his hematology oncology doctor, Dr. Lauro Soriano this is well and she agrees. She will consider perhaps further chemo therapy or palliative radiation. I will defer removal of the Xeroform bolster to my colleague Dr. Santana Carter. Medical Decision Making:   The following items were considered in medical decision making:  Independent review of images  Review / order clinical lab tests  Review / order radiology tests  Decision to obtain old records

## 2020-11-25 NOTE — PROGRESS NOTES
I agree. I think he just has really aggressive disease. I spoke with his oncologist and she feels that this is essentially end-stage for him. She is talking to the family about transitioning him to hospice. I think less is more at this point. I think we helped him with his quality of life as much as we could. Thanks for helping me with this tough case. Please let me know how he looks when you see him at follow-up.

## 2020-12-09 NOTE — PROGRESS NOTES
Evangelista      Patient Name: Terrance Lemus Record Number:  <[id number]>  Primary Care Physician:  Nathan Dunn MD  Date of Consultation: 12/9/2020     Chief Complaint:   Chief Complaint   Patient presents with    Follow-up     to the ear surgery         HISTORY OF Sim is a(n) 76 y.o. male who presents today for evaluation of multiple lesions of the head and neck. The patient is accompanied by his wife who provides part of the history. Patient first notes that he is had a left ear lesion which is been present for at least a year. It is slowly been growing in size. It sounds like it recently was determined to start undergoing rapid enlargement. Additionally he has multiple other lesions of the scalp which are being followed by dermatologist and which have been frozen and treated in the past.  He also states that he underwent a Mohs procedure over the summer for a large anterior chest wall mass. This was reportedly a mixture of basal and squamous cell. It was reportedly completely excised. It is now returned. He returned to his Mohs surgeon several months ago for procedure to be performed on his left ear when there was concern that it enlarged and the determination was made that a biopsy need to be performed. Per the patient although I do not have these records available to me there is a diagnosis of squamous cell cancer. The patient has a very complicated history given that he has had CLL for many years. He has received chemotherapy as well as targeted radiation to the neck for lymphadenopathy in the past.    I independently reviewed his CT chest from 3/20/2020. It shows evidence of stable findings of bulky mediastinal left axillary and upper abdominal lymphadenopathy. I do not appreciate the chest lesion. Update 10/14/2020:    Patient presents today for follow-up.   I independently reviewed his CT temporal bone as well as a CT chest and neck with contrast.  It shows evidence of a large left auricular lesion without discrete invasion of the temporal bone. CT chest there is a large central mass as noted in exam this appears to come close to but not invade the sternum. There is bulky adenopathy bilaterally in the neck, but per review it appears that this is decreased and he does have a history of adenopathy of the neck related to his CLL. I reviewed the results with the patient and formed him that both biopsy sites were consistent with squamous cell carcinoma. The patient continues to have significant tenderness associated with both of his wounds. His chest wound is draining significantly and his wife finds it difficult to perform dressing changes. Update 11/4/2020:    Patient presents today for follow-up. He has some issues related to new onset swelling and pain of his left lower extremity. He also has significant swelling of his penis. His pathology results are listed below. A. Right auricular (ear) lesion:      - Basal cell carcinoma; see Comment.        B. Chest wall tumor (squamous cell carcinoma):      - Moderately to poorly differentiated squamous cell carcinoma focally        a fraction of a mm from the black-inked deep margin (the tumor        capsule abuts the deep margin).        C. Left post-auricular sulcus inferior:      - Negative for carcinoma, keratin AE1+3/Cam 5.2 immunostain on block.        D. Left post-auricular sulcus superior:      - Negative for carcinoma, keratin AE1+3/Cam 5.2 immunostain on block.        E. Left helix:      - Squamous carcinoma present, the inked margin negative for tumor.        F. Left conchal bowl:      - Negative for carcinoma.        G. Left inferior conchal bowl:      - Negative for carcinoma, keratin AE1+3/Cam 5.2 immunostain on block.        H. Left lobule:      - Negative for carcinoma, keratin AE1+3/Cam 5.2 immunostain on block.        I.  Left mastoid:      - Negative for carcinoma, keratin AE1+3/Cam 5.2 immunostain on block.        J. Right partial auriculectomy (ear):      - Squamous cell carcinoma in situ focally to the blue-inked superior        margin.      - Invasive basal cell carcinoma, excised.      - See Comment.        K. Right auriculectomy margin:      - Squamous cell carcinoma, cannot exclude focal invasion, margins        negative.        L. Right lateral chest lesion:      - Moderately differentiated invasive squamous cell carcinoma, focally        to the black-inked deep margin and blue inked lateral tip, and less        than 1 mm from the green-inked inferior margin.        M. Left subtotal auriculectomy (ear):      - Moderately differentiated invasive squamous cell carcinoma to the        black-inked peripheral margin (extending from the obvious surface        lesion). Update 12/9/2020: The patient presents today for follow-up. He has been unable to use his hearing aids as the right one is clogged. They are set to get evaluated by his audiologist tomorrow and see if he can get a left hearing aid. Unfortunately he had had progression of his cutaneous disease. He is now on hospice.     Patient Active Problem List   Diagnosis    Chronic lymphocytic leukemia ([address]    Hypogammaglobulinemia (HCC)    CLL (chronic lymphoid leukemia) in relapse ([address]    Congenital blindness    Encounter for long-term (current) use of high-risk medication    Chronic lymphocytic leukemia of B-cell type in relapse ([address]    High risk medication use    Fever of unknown origin    DMII (diabetes mellitus, type 2) ([address]    Hypertension    Hyperlipidemia    Pneumonia due to organism    CAP (community acquired pneumonia)    Status post chemotherapy    CLL (chronic lymphocytic leukemia) (HCC)    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    SCC (squamous cell carcinoma), ear, left     Past Surgical History:   Procedure Laterality Date    APPENDECTOMY      COLONOSCOPY      EXTERNAL EAR SURGERY N/A 10/26/2020    TOTAL LEFT AURICULECTOMY, RIGHT AURICLE BIOPSY, RIGHT PARTIAL AURICULECTOMY,  RESECTION OF ANTERIOR CHEST WALL LESION, RECONSTRUCTION WITH SKIN GRAFT, LOCAL TISSUE FLAPS performed by Booker Lopez MD at [facility]  2006    HAND SURGERY Left 10/16/14    excision left thumb lesion with skin graft/trigger finger release left thumb and 5th finger    LYMPH NODE DISSECTION  2008    PRE-MALIGNANT / BENIGN SKIN LESION EXCISION      : excision 3.4 cm right shoulder lesion and 1.7 cm incisional biopsy left thumb    SKIN BIOPSY N/A 10/26/2020    EXCISION OF FUNGATING CHEST SQUAMOUS CELL CARCINOMA, SPLIT THICKNESS SKIN GRAFT FOR RECONSTRUCTION OF CHEST, POSSIBLE TEMPOPARIETAL MUSCLE FLAP FOR AURICULAR RECONSTRUCTION, SPLIT THICKNESS GRAFT performed by Avtar Ron MD at [address] 10/26/2020    .  performed by Avtar Ron MD at [address] TUNNELED VENOUS PORT PLACEMENT Left 2006    Removed on 5/5/2020 by Dr. Logan Brice TUNNELED VENOUS PORT PLACEMENT  2006    TUNNELED VENOUS PORT PLACEMENT Right 05/05/2020    SmartPort; RIJ access; cut at 25 cm; Dr. Sujata Sims     Family History   Problem Relation Age of Onset    Breast Cancer Sister     Coronary Art Dis Mother     Diabetes Mother     Elevated Lipids Mother     Hypertension Mother    Renata Banks Obesity Mother     Obesity Brother      Social History     Tobacco Use    Smoking status: Never Smoker    Smokeless tobacco: Never Used   Substance Use Topics    Alcohol use: Yes     Comment: rare use    Drug use: No        Admission on 11/04/2020, Discharged on 11/04/2020   Component Date Value Ref Range Status    WBC 11/04/2020 16.6* 4.0 - 11.0 K/uL Final    RBC 11/04/2020 3.05* 4.20 - 5.90 M/uL Final    Hemoglobin 11/04/2020 9.5* 13.5 - 17.5 g/dL Final    Hematocrit 11/04/2020 28.5* 40.5 - 52.5 % Final    MCV 11/04/2020 93.5  80.0 - 100.0 fL Final  MCH 11/04/2020 31.0  26.0 - 34.0 pg Final    MCHC 11/04/2020 33.2  31.0 - 36.0 g/dL Final    RDW 11/04/2020 16.5* 12.4 - 15.4 % Final    Platelets 69/01/4764 42* 135 - 450 K/uL Final    Result consistent with patient history    MPV 11/04/2020 8.0  5.0 - 10.5 fL Final    PLATELET SLIDE REVIEW 11/04/2020 Decreased   Final    SLIDE REVIEW 11/04/2020 see below   Final    Slide review agrees with reported results    Path Consult 11/04/2020 Yes   Final    Sent for Pathology Review    Neutrophils % 11/04/2020 21.0  % Final    Lymphocytes % 11/04/2020 75.0  % Final    Monocytes % 11/04/2020 3.0  % Final    Eosinophils % 11/04/2020 1.0  % Final    Basophils % 11/04/2020 0.0  % Final    Neutrophils Absolute 11/04/2020 3.5  1.7 - 7.7 K/uL Final    Lymphocytes Absolute 11/04/2020 12.5* 1.0 - 5.1 K/uL Final    Monocytes Absolute 11/04/2020 0.5  0.0 - 1.3 K/uL Final    Eosinophils Absolute 11/04/2020 0.2  0.0 - 0.6 K/uL Final    Basophils Absolute 11/04/2020 0.0  0.0 - 0.2 K/uL Final    Smudge Cells 11/04/2020 Present*  Final    Anisocytosis 11/04/2020 1+*  Final    Polychromasia 11/04/2020 Occasional*  Final    Acanthocytes 11/04/2020 Occasional*  Final    Ovalocytes 11/04/2020 Occasional*  Final    Ventricular Rate 11/04/2020 90  BPM Final    Atrial Rate 11/04/2020 90  BPM Final    P-R Interval 11/04/2020 174  ms Final    QRS Duration 11/04/2020 80  ms Final    Q-T Interval 11/04/2020 356  ms Final    QTc Calculation (Bazett) 11/04/2020 435  ms Final    P Axis 11/04/2020 42  degrees Final    R Axis 11/04/2020 1  degrees Final    T Axis 11/04/2020 10  degrees Final    Diagnosis 11/04/2020 Sinus rhythm with occasional Premature ventricular complexesLow voltage QRSInferior infarct , age undeterminedConfirmed by WANDA HARRIS, Gabi Mayo ([id number]) on 11/4/2020 4:07:16 PM   Final    Sodium 11/04/2020 133* 136 - 145 mmol/L Final    Potassium reflex Magnesium 11/04/2020 4.6  3.5 - 5.1 mmol/L Final    Chloride 11/04/2020 101  99 - 110 mmol/L Final    CO2 11/04/2020 24  21 - 32 mmol/L Final    Anion Gap 11/04/2020 8  3 - 16 Final    Glucose 11/04/2020 58* 70 - 99 mg/dL Final    BUN 11/04/2020 12  7 - 20 mg/dL Final    CREATININE 11/04/2020 0.8  0.8 - 1.3 mg/dL Final    GFR Non- 11/04/2020 >60  >60 Final    Comment: >60 mL/min/1.73m2 EGFR, calc. for ages 25 and older using the  MDRD formula (not corrected for weight), is valid for stable  renal function.  GFR  11/04/2020 >60  >60 Final    Comment: Chronic Kidney Disease: less than 60 ml/min/1.73 sq.m. Kidney Failure: less than 15 ml/min/1.73 sq.m. Results valid for patients 18 years and older.  Calcium 11/04/2020 9.0  8.3 - 10.6 mg/dL Final    Pro-BNP 11/04/2020 348  0 - 449 pg/mL Final    Comment: Methodology by NT-proBNP    An age-independent cutoff point of 300 pg/ml has a 98%  negative predictive value excluding acute heart failure. Values exceeding the age-related cutoff values (450 pg/mL if  age<50, 900 if 50-75 and 1800 if >75) has 90% sensitivity and  84% specificity for diagnosing acute HF. In patients with  renal compromise (eGFR<60) values greater than 1200pg/ml have  a diagnostic sensitivity and specificity of 89% and 72% for  acute HF.  D-Dimer, Quant 11/04/2020 549* 0 - 229 ng/mL DDU Final    Comment: HemosIL D-Dimer is an automated latex enhanced immunoassay for  the quantitative determination of D-dimer in human citrated  plasma on IL Coagulation systems for use in conjunction with a  clinical pretest probability(PTP) assessment model to exclude  venous thromboembolism(VTE) in outpatients suspected of deep  venous thrombosis (DVT) and pulmonary embolism(PE). The  reference range for D-Dimer is <230 ng/ml DDU. Results <230 ng/ml  DDU can be used as a negative predictor in patients with low  and moderate probability of DVT/PE. D-Dimer levels are mainly  elevated in cases of DVT/PE. Other conditions may lead to a  high D-Dimer level including old age, pregnancy, peripheral  arteriopathy, DIC, coronary disease, thrombolytic treatment,  cancer, liver disease, infection, inflammation, hematoma, and  rheumatoid arthritis. Specimen interferences are created by  lipemia, bilirubin, and hemolysis.  Path Consult 11/04/2020 Reviewed   Final    Comment: Peripheral blood smear and histogram are reviewed. Lymphocytosis is present, consisting of predominantly small forms with  condensed chromatin. These findings may represent a reactive process  such  as viral infection (infectious mononucleosis, CMV, hepatitis,  upper respiratory, etc.), drug effect, autoimmune disorder,  post immunization. However, a lymphoproliferative  process/neoplasm is not excluded. Correlation with clinical findings is  recommended. If no etiology is identified and/or the findings  persist, consider hematology consult with flow cytometric analysis. There is also moderate normocytic anemia and moderate to marked active  thrombocytopenia. Clinical correlation is recommended. CPT code: 92605. Electronically signed: Kai Grove MD          DRUG/FOOD ALLERGIES: Patient has no known allergies. CURRENT MEDICATIONS  Prior to Admission medications    Medication Sig Start Date End Date Taking?  Authorizing Provider   ondansetron (ZOFRAN-ODT) 4 MG disintegrating tablet Take 1 tablet by mouth every 8 hours as needed for Nausea or Vomiting 10/28/20   Jonel Landau, APRN - CNP   CALQUENCE 100 MG CAPS 2 times daily  9/14/20   Historical Provider, MD   acetaminophen (APAP EXTRA STRENGTH) 500 MG tablet Take 1 tablet by mouth every 6 hours as needed for Pain or Fever 5/27/19   BEREKET Tuttle   glimepiride (AMARYL) 1 MG tablet Take 1 mg by mouth Daily with supper     Historical Provider, MD   Insulin Degludec (TRESIBA FLEXTOUCH) 100 UNIT/ML SOPN Inject 22-30 Units into the skin nightly     Historical Provider, MD   venlafaxine (EFFEXOR) 37.5 MG tablet Take 37.5 mg by mouth daily     Historical Provider, MD   Liraglutide (VICTOZA) 18 MG/3ML SOPN SC injection Inject 0.6 mg into the skin daily    Historical Provider, MD   lisinopril (PRINIVIL;ZESTRIL) 20 MG tablet Take 20 mg by mouth daily     Historical Provider, MD   MULTIPLE VITAMIN PO Take  by mouth. Take 1 vitamin a day. Senior Vitamin    Historical Provider, MD   desloratadine (CLARINEX) 5 MG tablet Take 5 mg by mouth daily. Historical Provider, MD   fenofibrate (TRICOR) 145 MG tablet Take 145 mg by mouth daily. Historical Provider, MD       REVIEW OF SYSTEMS  The following systems were reviewed and revealed the following in addition to any already discussed in the HPI:    CONSTITUTIONAL: no weight loss, no fever, no night sweats, no chills  EYES: no vision changes, no blurry vision  EARS: no changes in hearing, no otalgia  NOSE: no epistaxis, no rhinorrhea  RESPIRATORY: no  Difficulty breathing, no shortness of breath  CV: no chest pain, no Peripheral vascular disease  HEME: No coagulation disorder, no Bleeding disorder  NEURO: no TIA or stroke-like symptoms  SKIN: No new rashes in the head and neck, no recent skin cancers  MOUTH: No new ulcers, no recent teeth infections  GASTROINTESTINAL: No diarrhea, stomach pain  PSYCH: No anxiety, no depression      PHYSICAL EXAM  BP (!) 89/58 (Site: Right Upper Arm, Position: Sitting, Cuff Size: Medium Adult)   Pulse 97   Temp 97.4 °F (36.3 °C) (Temporal)     GENERAL: No Acute Distress, Alert and Oriented, no Hoarseness, strong voice  EYES: EOMI, Anti-icteric  HENT:   Head: Normocephalic and atraumatic. Face:  Symmetric, facial nerve intact, no sinus tenderness  Right Ear: Normal external ear, normal external auditory canal, intact tympanic membrane with normal mobility and aerated middle ear  Left Ear: There is a large 7 cm exophytic lesion of the left auricle extending posteriorly to the sulcus and anteriorly to the conchal bowl. There is no obvious erosion of the external auditory canal.  Mouth/Oral Cavity:  normal lips, Uvula is midline, no mucosal lesions, no trismus, fair dentition, normal salivary quality/flow  Oropharynx/Larynx:  normal oropharynx, normal tonsils; normal larynx/nasopharynx on mirror exam  Nose:Normal external nasal appearance. Anterior rhinoscopy shows a line septum. Normal turbinates. Normal mucosa   NECK: Normal range of motion, no thyromegaly, trachea is midline, no lymphadenopathy, no neck masses, no crepitus  CHEST: Normal respiratory effort, no retractions, breathing comfortably  SKIN: No rashes, normal appearing skin, no evidence of skin lesions/tumors  Neuro:  cranial nerve II-XII intact; normal gait  Cardio:  no edema    Well-healing auriculectomy defects on both the right and left. There is some crusting and exposed cartilage on the right. On the left the flaps have healed well. The anterior chest wall skin graft site is essentially healed. There is now multiple large nodules along the lower rim of the surgical excision site with involvement reaching up into the left shoulder        PROCEDURE-from initial consult    PROCEDURE  Incisional biopsy of left ear lesion and anterior chest wall lesion - CPT 20340 and 48678    Pre op: Nasal tip lesion  Post op: Same  Procedure :   Surgeon: Booker Lopez MD  Anesthesia: 2% Xylocaine with [id number] epi     Description: After written consent was obtained the ear lesion and chest lesion were visualized and the surrounding area was infiltrated with 2% Xylocaine with 1 100,000 epinephrine. After allowing adequate anesthesia to take place, a scalpel biopsy was used for each site. A pair of iris scissors were utilized to make sharp cuts at the base of the lesion and a portion of the lesion was removed from both sites. Hemostasis was obtained and the would was closed if appropriate. Pertinent findings: Full biopsy of both sites.  The patient tolerated the procedure

## 2020-12-16 NOTE — PROGRESS NOTES
[facility]  TELEHEALTH EVALUATION -- Audio/Visual (During XVUTD-25 public health emergency)      Due to the COVID-19 pandemic restrictions on close contact interactions as recommended by CDC and health authorities, the patient's visit was conducted via telemedicine in lieu of a face to face visit. Patient verbally consented and agreed to proceed. PROCEDURE:          1) Excision of fungating chest squamous cell carcinoma (20 x 11.5 cm)                                                         2) Application of split thickness skin graft to chest (20 x 11.5 cm)                                                         3) Application of wound vacuum device                                                         4) Excision of right lateral chest lesion (2.5 x 1 cm)                                                         5) Complex closure of right chest (3.6 cm)                                                         6) Left auricular reconstruction with scalp advancement flap (wound: 2 x 5 cm; flap: 3.5 x 5 cm)                                                         7) Application of split thickness skin graft to scalp (2.5 x 1.5 cm)  DATE: 10/26/20    Jolanta Bishop has been recovering well since his procedure. Pain has been well controlled with the prescribed pain management regimen. Unfortunately, there is a large level recurrence and he is currently in hospice. EXAM    Exam limited secondary to virtual visit. PATHOLOGY: SCC    IMP: 76 y. o.male s/p excision of fungating SCC and STSG closure to chest and auricular reconstruction  PLAN: Discussed that without removal of his sternal bone, there would be no other method for eradicating his disease - which was understood with the palliative component of his surgery previously. He is in hospice and will continue with local wound care and follow-up PRN.     Kacy Grewal MD  [address] [place] [address] [address] Reconstructive Surgery  [phone]  12/16/20 Pursuant to the emergency declaration under the [facility], [facility] waiver authority and the [employer] Act, this Virtual Visit was conducted, with patient's consent, to reduce the patient's risk of exposure to COVID-19 and provide continuity of care for an established patient. Services were provided through a video synchronous discussion virtually to substitute for in-person clinic visit.

## 2020-12-22 ENCOUNTER — TELEPHONE (OUTPATIENT)
Dept: SURGERY | Age: 75
End: 2020-12-22

## (undated) DEVICE — SURGICAL SET UP - SURE SET: Brand: MEDLINE INDUSTRIES, INC.

## (undated) DEVICE — MARKER SKIN GENTIAN VLT 2 TIP W RUL PREP RESIST INK SKIN

## (undated) DEVICE — SUTURE VCRL SZ 4-0 L18IN ABSRB UD RB-1 L17MM 1/2 CIR J714D

## (undated) DEVICE — DRESSING PETRO W3XL8IN OIL EMUL N ADH GZ KNIT IMPREG CELOS

## (undated) DEVICE — INTENDED FOR TISSUE SEPARATION, AND OTHER PROCEDURES THAT REQUIRE A SHARP SURGICAL BLADE TO PUNCTURE OR CUT.: Brand: BARD-PARKER ® CARBON RIB-BACK BLADES

## (undated) DEVICE — Z DISCONTINUED GLOVE SURG SZ 7.5 L12IN FNGR THK13MIL WHT ISOLEX

## (undated) DEVICE — 3M™ TEGADERM™ TRANSPARENT FILM DRESSING FRAME STYLE, 1628, 6 IN X 8 IN (15 CM X 20 CM), 10/CT 8CT/CASE: Brand: 3M™ TEGADERM™

## (undated) DEVICE — ELECTROSURGICAL PENCIL ROCKER SWITCH NON COATED BLADE ELECTRODE 10 FT (3 M) CORD HOLSTER: Brand: MEGADYNE

## (undated) DEVICE — GOWN,SIRUS,POLYRNF,BRTHSLV,LG,30/CS: Brand: MEDLINE

## (undated) DEVICE — CONTAINER,SPECIMEN,PNEU TUBE,3OZ,OR STRL: Brand: MEDLINE

## (undated) DEVICE — TRAY PREP DRY W/ PREM GLV 2 APPL 6 SPNG 2 UNDPD 1 OVERWRAP

## (undated) DEVICE — COVER LT HNDL BLU PLAS

## (undated) DEVICE — PENCIL ES ULT VAC W TELSCP NOSE EZ CLN BLDE 10FT

## (undated) DEVICE — SURE SET-DOUBLE BASIN-LF: Brand: MEDLINE INDUSTRIES, INC.

## (undated) DEVICE — TUBING SUCT 10FR MAL ALUM SHFT FN CAP VENT UNIV CONN W/ OBT

## (undated) DEVICE — GLOVE ORANGE PI 8 1/2   MSG9085

## (undated) DEVICE — SHEET,DRAPE,40X58,STERILE: Brand: MEDLINE

## (undated) DEVICE — CANISTER NEG PRSS 500ML WHT SENSATRAC TBNG CLMP CONN

## (undated) DEVICE — GAUZE,SPONGE,4"X4",16PLY,XRAY,STRL,LF: Brand: MEDLINE

## (undated) DEVICE — 3M™ TEGADERM™ TRANSPARENT FILM DRESSING FRAME STYLE, 1629, 8 IN X 12 IN (20 CM X 30 CM), 10/CT 8CT/CASE: Brand: 3M™ TEGADERM™

## (undated) DEVICE — DRAPE,T,LAPARO,TRANS,STERILE: Brand: MEDLINE

## (undated) DEVICE — SPONGE,LAP,18"X18",DLX,XR,ST,5/PK,40/PK: Brand: MEDLINE

## (undated) DEVICE — 3M™ STERI-DRAPE™ INSTRUMENT POUCH 1018: Brand: STERI-DRAPE™

## (undated) DEVICE — 3M™ IOBAN™ 2 ANTIMICROBIAL INCISE DRAPE 6648EZ: Brand: IOBAN™ 2

## (undated) DEVICE — GLOVE ORTHO 7 1/2   MSG9475

## (undated) DEVICE — DERMATOME BLADES: Brand: DERMATOME

## (undated) DEVICE — DRAPE IRRIG FLD WRM W44XL44IN W/ AORN STD PRTBL INTRATEMP

## (undated) DEVICE — ELECTRODE NDL L2.8IN COAT LO PWR SET EDGE

## (undated) DEVICE — STAPLER SKIN H3.9MM WIRE DIA0.58MM CRWN 6.9MM 35 STPL ROT

## (undated) DEVICE — SUTURE PERMAHAND SZ 2-0 L18IN NONABSORBABLE BLK L26MM SH C012D

## (undated) DEVICE — COTTON BALLS: Brand: DEROYAL

## (undated) DEVICE — DRESSING PETRO W3XL8IN N ADH KNIT CELOS ACETT ADPTC

## (undated) DEVICE — SUTURE CHROMIC GUT SZ 4-0 L27IN ABSRB BRN L17MM RB-1 1/2 U203H

## (undated) DEVICE — PLATE ES AD W 9FT CRD 2

## (undated) DEVICE — SUTURE PLN GUT SZ 5-0 L18IN ABSRB YELLOWISH TAN L13MM PC-1 1915G

## (undated) DEVICE — PACK,EENT,TURBAN DRAPE,PK II: Brand: MEDLINE

## (undated) DEVICE — SUTURE PDS II SZ 3-0 L27IN ABSRB UD FS-2 L19MM 3/8 CIR REV Z423H

## (undated) DEVICE — SUTURE PROL SZ 4-0 L18IN NONABSORBABLE BLU L19MM PS-2 3/8 8682G

## (undated) DEVICE — SYRINGE MED 30ML STD CLR PLAS LUERLOCK TIP N CTRL DISP

## (undated) DEVICE — ELECTRODE ELECSURG NDL 2.8 INX7.2 CM COAT INSUL EDGE

## (undated) DEVICE — PACK,UNIVERSAL,NO GOWNS: Brand: MEDLINE

## (undated) DEVICE — JEWISH HOSPITAL TURNOVER KIT: Brand: MEDLINE INDUSTRIES, INC.

## (undated) DEVICE — 3 TO 1 DERMACARRIER: Brand: MESHGRAFTTM II TISSUE EXPANSION SYSTEM

## (undated) DEVICE — GLOVE ORANGE PI 7 1/2   MSG9075

## (undated) DEVICE — SUTURE CHROMIC GUT SZ 3-0 L27IN ABSRB BRN L19MM FS-2 3/8 636H

## (undated) DEVICE — MEDICINE CUP, GRADUATED, STER: Brand: MEDLINE

## (undated) DEVICE — DRESSING NEG PRSS L 26X15X3.2CM W/ 1 PD W/ CONN 2 SHT STD

## (undated) DEVICE — E-Z CLEAN, NON-STICK, PTFE COATED, ELECTROSURGICAL BLADE ELECTRODE, MODIFIED EXTENDED INSULATION, 2.5 INCH (6.35 CM): Brand: MEGADYNE

## (undated) DEVICE — SUTURE MCRYL SZ 3-0 L27IN ABSRB UD L19MM PS-2 3/8 CIR PRIM Y427H

## (undated) DEVICE — PAD,NON-ADHERENT,3X8,STERILE,LF,1/PK: Brand: MEDLINE

## (undated) DEVICE — NEEDLE HYPO 22GA L1.5IN BLK POLYPR HUB S STL REG BVL STR

## (undated) DEVICE — 3M™ TEGADERM™ TRANSPARENT FILM DRESSING FRAME STYLE, 1627, 4 IN X 10 IN (10 CM X 25 CM), 20/CT 4CT/CASE: Brand: 3M™ TEGADERM™

## (undated) DEVICE — SOLUTION IV 1000ML 0.9% SOD CHL

## (undated) DEVICE — SYRINGE MED 10ML LUERLOCK TIP W/O SFTY DISP

## (undated) DEVICE — SHEET,DRAPE,53X77,STERILE: Brand: MEDLINE

## (undated) DEVICE — SUTURE PERMAHAND SZ 3-0 L18IN NONABSORBABLE BLK SILK BRAID A184H

## (undated) DEVICE — APPLICATOR MEDICATED 26 CC SOLUTION HI LT ORNG CHLORAPREP

## (undated) DEVICE — SUTURE NRLN SZ 4-0 L18IN NONABSORBABLE BLK L13MM TF 1/2 CIR C584D

## (undated) DEVICE — STANDARD HYPODERMIC NEEDLE,POLYPROPYLENE HUB: Brand: MONOJECT

## (undated) DEVICE — Device

## (undated) DEVICE — GARMENT,MEDLINE,DVT,INT,CALF,MED, GEN2: Brand: MEDLINE

## (undated) DEVICE — INTENDED USE FOR SURGICAL MARKING ON INTACT SKIN, ALSO PROVIDES A PERMANENT METHOD OF IDENTIFYING OBJECTS IN THE OPERATING ROOM: Brand: WRITESITE® PLUS MINI PREP RESISTANT MARKER

## (undated) DEVICE — 3M™ STERI-STRIP™ BLEND TONE SKIN CLOSURES, B1557, TAN, 1/2 IN X 4 IN (12MM X 100MM), 6 STRIPS/ENVELOPE: Brand: 3M™ STERI-STRIP™

## (undated) DEVICE — DRESSING,GAUZE,XEROFORM,CURAD,5"X9",ST: Brand: CURAD